# Patient Record
Sex: MALE | Race: WHITE | NOT HISPANIC OR LATINO | URBAN - METROPOLITAN AREA
[De-identification: names, ages, dates, MRNs, and addresses within clinical notes are randomized per-mention and may not be internally consistent; named-entity substitution may affect disease eponyms.]

---

## 2021-09-02 ENCOUNTER — EMERGENCY (EMERGENCY)
Facility: HOSPITAL | Age: 61
LOS: 0 days | Discharge: HOME | End: 2021-09-02
Attending: EMERGENCY MEDICINE | Admitting: EMERGENCY MEDICINE
Payer: COMMERCIAL

## 2021-09-02 VITALS
RESPIRATION RATE: 16 BRPM | TEMPERATURE: 99 F | WEIGHT: 220.02 LBS | DIASTOLIC BLOOD PRESSURE: 78 MMHG | OXYGEN SATURATION: 98 % | SYSTOLIC BLOOD PRESSURE: 126 MMHG | HEART RATE: 78 BPM

## 2021-09-02 DIAGNOSIS — Z23 ENCOUNTER FOR IMMUNIZATION: ICD-10-CM
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
## 2021-09-02 DIAGNOSIS — Z87.891 PERSONAL HISTORY OF NICOTINE DEPENDENCE: ICD-10-CM

## 2021-09-02 DIAGNOSIS — I10 ESSENTIAL (PRIMARY) HYPERTENSION: ICD-10-CM

## 2021-09-02 DIAGNOSIS — S91.312A LACERATION WITHOUT FOREIGN BODY, LEFT FOOT, INITIAL ENCOUNTER: ICD-10-CM
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
## 2021-09-02 DIAGNOSIS — S91.115A LACERATION WITHOUT FOREIGN BODY OF LEFT LESSER TOE(S) WITHOUT DAMAGE TO NAIL, INITIAL ENCOUNTER: ICD-10-CM

## 2021-09-02 DIAGNOSIS — M79.672 PAIN IN LEFT FOOT: ICD-10-CM

## 2021-09-02 PROCEDURE — 12002 RPR S/N/AX/GEN/TRNK2.6-7.5CM: CPT

## 2021-09-02 PROCEDURE — 73630 X-RAY EXAM OF FOOT: CPT | Mod: 26,LT

## 2021-09-02 PROCEDURE — 99284 EMERGENCY DEPT VISIT MOD MDM: CPT | Mod: 25

## 2021-09-02 RX ORDER — TETANUS TOXOID, REDUCED DIPHTHERIA TOXOID AND ACELLULAR PERTUSSIS VACCINE, ADSORBED 5; 2.5; 8; 8; 2.5 [IU]/.5ML; [IU]/.5ML; UG/.5ML; UG/.5ML; UG/.5ML
0.5 SUSPENSION INTRAMUSCULAR ONCE
Refills: 0 | Status: COMPLETED | OUTPATIENT
Start: 2021-09-02 | End: 2021-09-02

## 2021-09-02 RX ORDER — TETANUS AND DIPHTHERIA TOXOIDS ADSORBED 2; 2 [LF]/.5ML; [LF]/.5ML
0.5 INJECTION INTRAMUSCULAR ONCE
Refills: 0 | Status: DISCONTINUED | OUTPATIENT
Start: 2021-09-02 | End: 2021-09-02

## 2021-09-02 RX ADMIN — TETANUS TOXOID, REDUCED DIPHTHERIA TOXOID AND ACELLULAR PERTUSSIS VACCINE, ADSORBED 0.5 MILLILITER(S): 5; 2.5; 8; 8; 2.5 SUSPENSION INTRAMUSCULAR at 13:39

## 2021-09-02 NOTE — ED PROVIDER NOTE - CLINICAL SUMMARY MEDICAL DECISION MAKING FREE TEXT BOX
60 Y/O M WITH L FOOT LACERATION. FOOT XRAYS NORMAL. LACERATION REPAIRED. PT GIVEN ABX AND RETURN INSTRUCTIONS.

## 2021-09-02 NOTE — ED PROVIDER NOTE - NSFOLLOWUPINSTRUCTIONS_ED_ALL_ED_FT
Please take antibiotics 2x per day for the next 10 days, and return to a doctor in 14 days to have your stitches removed.     Laceration    A laceration is a cut that goes through all of the layers of the skin and into the tissue that is right under the skin. Some lacerations heal on their own. Others need to be closed with skin adhesive strips, skin glue, stitches (sutures), or staples. Proper laceration care minimizes the risk of infection and helps the laceration to heal better.  If non-absorbable stitches or staples have been placed, they must be taken out within the time frame instructed by your healthcare provider.    SEEK IMMEDIATE MEDICAL CARE IF YOU HAVE ANY OF THE FOLLOWING SYMPTOMS: swelling around the wound, worsening pain, drainage from the wound, red streaking going away from your wound, inability to move finger or toe near the laceration, or discoloration of skin near the laceration.

## 2021-09-02 NOTE — ED ADULT NURSE NOTE - CAS TRG GENERAL AIRWAY, MLM
[Time Spent: ___ minutes] : I have spent [unfilled] minutes of face to face time with the patient [>50% of Time Spent on Counseling for ____] : Greater than 50% of the encounter time was spent on counseling for [unfilled] Patent

## 2021-09-02 NOTE — ED PROVIDER NOTE - OBJECTIVE STATEMENT
61M no pmhx presents with left foot pain, notes he fell down a few stairs last night when trying to go down to his basement, denies head trauma/loc, started bleeding from his left 5th toe, unknown tetanus status, denies numbness/tingling.

## 2021-09-02 NOTE — ED PROVIDER NOTE - PHYSICAL EXAMINATION
Vital Signs: I have reviewed the initial vital signs.  Constitutional: well-nourished, appears stated age, no acute distress.  HEENT: Airway patent, moist MM, no erythema/swelling/deformity of oral structures. EOMI, PERRLA.  CV: regular rate, regular rhythm, well-perfused extremities, 2+ b/l DP and radial pulses equal.  Lungs: BCTA, no increased WOB.  ABD: NTND, no guarding or rebound, no pulsatile mass, no hernias, no flank pain.   MSK: Neck supple, nontender, nl ROM, no stepoff. Chest nontender. Back nontender in TLS spine or to b/l bony structures. there are 2 lacerations noted to the left 5th toe, a 2 cm laceration noted to the webbing between the 4th and 5th toe as well as a 2 cm laceration on the plantar surface of the MTP of the 5th toe.   INTEG: Skin warm, dry, no rash.  NEURO: A&Ox3, moving all extremities, normal speech  PSYCH: Calm, cooperative, normal affect and interaction.

## 2021-09-02 NOTE — ED PROVIDER NOTE - PATIENT PORTAL LINK FT
You can access the FollowMyHealth Patient Portal offered by [facility] by registering at the following website: [email or web address]. By joining [employer]’s FollowMyHealth portal, you will also be able to view your health information using other applications (apps) compatible with our system.

## 2021-09-02 NOTE — ED PROVIDER NOTE - NSFOLLOWUPCLINICS_GEN_ALL_ED_FT
[facility]  Medicine  [address], [place]   Phone: [phone]  Fax:   Follow Up Time: Routine

## 2021-09-02 NOTE — ED PROVIDER NOTE - ATTENDING CONTRIBUTION TO CARE
I personally evaluated the patient. I reviewed the Resident’s or Physician Assistant’s note (as assigned above), and agree with the findings and plan except as documented in my note.   62 Y/O M HTN S/O FOOT INJURY WHILE CLEARING A FLOOD IN HIS BASEMENT LAST NIGHT. PT IS UNSURE OF HOW HE INJURED THE FOOT. TETANUS STATUS UNKNOWN. NO OTHER INJURIES. VITALS NOTED. ALERT OX3 NAD WELL APPEARING. L FOOT WITH 3 CM LACERATION FROM 1/2 WEBSPACE TO DORSUM OF FOOT. + TENDERNESS OVER 4/5TH TOES AND 4/5 METACARPALS. FROM ALL TOES. 2+ PEDAL PULSES B/L. NO EDEMA.

## 2022-09-16 ENCOUNTER — EMERGENCY (EMERGENCY)
Facility: HOSPITAL | Age: 62
LOS: 0 days | Discharge: HOME | End: 2022-09-16
Attending: EMERGENCY MEDICINE | Admitting: EMERGENCY MEDICINE

## 2022-09-16 VITALS
HEIGHT: 69 IN | RESPIRATION RATE: 18 BRPM | HEART RATE: 106 BPM | WEIGHT: 220.02 LBS | SYSTOLIC BLOOD PRESSURE: 154 MMHG | DIASTOLIC BLOOD PRESSURE: 74 MMHG | OXYGEN SATURATION: 96 %

## 2022-09-16 VITALS
HEART RATE: 88 BPM | RESPIRATION RATE: 18 BRPM | SYSTOLIC BLOOD PRESSURE: 148 MMHG | DIASTOLIC BLOOD PRESSURE: 72 MMHG | OXYGEN SATURATION: 100 %

## 2022-09-16 DIAGNOSIS — I10 ESSENTIAL (PRIMARY) HYPERTENSION: ICD-10-CM

## 2022-09-16 DIAGNOSIS — R04.0 EPISTAXIS: ICD-10-CM

## 2022-09-16 DIAGNOSIS — E78.5 HYPERLIPIDEMIA, UNSPECIFIED: ICD-10-CM

## 2022-09-16 DIAGNOSIS — Z79.82 LONG TERM (CURRENT) USE OF ASPIRIN: ICD-10-CM

## 2022-09-16 PROCEDURE — 99284 EMERGENCY DEPT VISIT MOD MDM: CPT

## 2022-09-16 NOTE — ED PROVIDER NOTE - PROGRESS NOTE DETAILS
packing soaked with txa, placed, observed in ed, no bleeding from left nare, right nare with packing still mild ooze with packing in place, offered labs and more observational time- s/w pt about need for potential transfer to [place] for ent eval, pt would like to go home at this time, spoke strictly about return precautions for continuos bleeding or spitting up blood from post nasal drip. verbalizes understanding.

## 2022-09-16 NOTE — ED PROVIDER NOTE - CLINICAL SUMMARY MEDICAL DECISION MAKING FREE TEXT BOX
62y male on ASA 81mg with h/o childhood nosebleeds c/o nosebleed since last night, initially on right then on both sides sent from [facility] [place], on exam vital signs appreciated, well appearing conj pink +epistaxis right nare, no site visible, packed with mild improvement but still oozing anteriorly, repacked with TXA soaked rhinorocket with improvement, no blood in posterior op, plan for further observation and possible b/l packing as still with some oozing but patient does not want to wait/prefers to manage at home, will d/c with ENT f/u. Patient counseled regarding conditions which should prompt return.

## 2022-09-16 NOTE — ED PROVIDER NOTE - NSFOLLOWUPINSTRUCTIONS_ED_ALL_ED_FT
Our Emergency Department Referral Coordinators will be reaching out ot you in the next 24-48 hours from 9:00am to 5:00pm (Monday to Friday) with a follow up appointment. Please expect a phone call from the hospital in that time frame. If you do not receive a call or if you have any questions or concerns, you can reach them at [phone] or [phone].       Epistaxis (nosebleed)    Nosebleeds are common and can be caused by many conditions, such as injury, infections, dry mucous membranes or dry climate, medicines, nose picking, and home heating and cooling systems. Try controlling your nosebleed by pinching your nose continuously for at least 10 minutes. Avoid lying down while you are having a nosebleed. Sit up and lean forward. Avoid blowing or sniffing your nose for a number of hours after having a nosebleed. Resume your normal activities as you are able, but avoid straining, lifting, or bending at the waist for several days. Maintain humidity in your home by using less air conditioning or by using a humidifier.     If your nose was packed by your health care provider, try to maintain the pack inside of your nose until your health care provider removes it. If a balloon catheter was used to pack your nose, do not cut or remove it unless your health care provider has instructed you to do that.     Aspirin and blood thinners make bleeding more likely. If you are prescribed these medicines and you suffer from nosebleeds, ask your health care provider if you should stop taking the medicines or adjust the dose. Do not stop medicines unless directed by your health care provider     SEEK IMMEDIATE MEDICAL CARE IF YOU HAVE THE FOLLOWING SYMPTOMS: nosebleed lasting longer than 20 minutes, unusual bleeding from or bruising on other parts of your body, dizziness or lightheadedness, nosebleed occurring after a head injury, or fever.

## 2022-09-16 NOTE — ED PROVIDER NOTE - ATTENDING APP SHARED VISIT CONTRIBUTION OF CARE
62y male on ASA 81mg with h/o childhood nosebleeds c/o nosebleed since last night, initially on right then on both sides sent from [facility] [place], on exam vital signs appreciated, well appearing conj pink +epistaxis right nare, no site visible, packed with mild improvement but still oozing anteriorly, repacked with TXA soaked rhinorocket with improvement, no blood in posterior op, plan for further observation and possible b/l packing as still with some oozing but patient does not want to wait/prefers to manage at home, will d/c with ENT f/u. Patient counseled regarding conditions which should prompt return.

## 2022-09-16 NOTE — ED PROVIDER NOTE - CARE PROVIDER_API CALL
Manny Weber)  Otolaryngology  [address] [place] [address], [address] [place] [address]  Phone: [phone]  Fax: [phone]  Follow Up Time: 1-3 Days

## 2022-09-16 NOTE — ED PROVIDER NOTE - PATIENT PORTAL LINK FT
You can access the FollowMyHealth Patient Portal offered by [facility] by registering at the following website: [email or web address]. By joining [employer]’s FollowMyHealth portal, you will also be able to view your health information using other applications (apps) compatible with our system.

## 2022-09-16 NOTE — ED PROVIDER NOTE - OBJECTIVE STATEMENT
61 yo male, pmh of htn, hld, on asa, presents to ed for epistaxis, started last night, right sided, not better with compression, no pain or radiation. denies trauma, cp, sob, nv.

## 2022-09-16 NOTE — ED PROVIDER NOTE - NS ED ATTENDING STATEMENT MOD
This was a shared visit with the ERROL. I reviewed and verified the documentation and independently performed the documented:

## 2022-09-16 NOTE — ED PROVIDER NOTE - PHYSICAL EXAMINATION
Physical Exam    Vital Signs: I have reviewed the initial vital signs.  Constitutional: appears stated age, no acute distress  Eyes: Conjunctiva pink, Sclera clear,  ENT: OP is clear without exudates, normal dentition, normal gingival, tongue without swelling, TMs clear b/l, right nare with continuos oozing of blood, unable to identify bleeding source, left nare clear.   Cardiovascular: S1 and S2, regular rate, regular rhythm, well-perfused extremities, radial pulses equal and 2+, pedal pulses 2+ and equal  Respiratory: unlabored respiratory effort, clear to auscultation bilaterally no wheezing, rales and rhonchi  Musculoskeletal: supple neck, no lower extremity edema, no midline tenderness  Integumentary: warm, dry, no rash  Neurologic: awake, alert, nvi

## 2022-09-19 ENCOUNTER — APPOINTMENT (OUTPATIENT)
Dept: OTOLARYNGOLOGY | Facility: CLINIC | Age: 62
End: 2022-09-19

## 2022-09-19 PROCEDURE — 31238 NSL/SINS NDSC SRG NSL HEMRRG: CPT

## 2022-09-19 PROCEDURE — 99203 OFFICE O/P NEW LOW 30 MIN: CPT | Mod: 25

## 2022-09-19 NOTE — HISTORY OF PRESENT ILLNESS
[de-identified] : Patient presents today c/o Epistaxis. Patient had a nosebleed two weeks ago and then again Thursday night that would not stop. Patient went to ED Friday morning and is here today to remove packing. Patient had constant nosebleeds a s a child but he has not had a nosebleed again in the past 50 years till this month. Patient takes aspirin often for pain.

## 2022-09-19 NOTE — ASSESSMENT
[FreeTextEntry1] : I discussed with the patient the pathophysiology of anterior epistaxis. I showed on a drawing the location of the anterior vascular area. I explained the risk factors including but not limited to nasal dryness, and recommended nasal moisturizing and avoiding any intranasal trauma. I also demonstrated how to stop a bleeding if it happens again.\par

## 2022-09-28 ENCOUNTER — APPOINTMENT (OUTPATIENT)
Dept: OTOLARYNGOLOGY | Facility: CLINIC | Age: 62
End: 2022-09-28

## 2022-09-28 VITALS — HEIGHT: 68.5 IN | BODY MASS INDEX: 32.96 KG/M2 | WEIGHT: 220 LBS

## 2022-09-28 DIAGNOSIS — R43.1 PAROSMIA: ICD-10-CM

## 2022-09-28 PROCEDURE — 99214 OFFICE O/P EST MOD 30 MIN: CPT | Mod: 25

## 2022-09-28 PROCEDURE — 31231 NASAL ENDOSCOPY DX: CPT

## 2022-09-28 NOTE — HISTORY OF PRESENT ILLNESS
[FreeTextEntry1] : Patient returns today for follow up Epistaxis. Patient states he has not bled since last visit put had a fishy odor inside of his nose and a dry mouth.

## 2022-10-07 ENCOUNTER — APPOINTMENT (OUTPATIENT)
Dept: OTOLARYNGOLOGY | Facility: CLINIC | Age: 62
End: 2022-10-07

## 2023-03-09 ENCOUNTER — TRANSCRIPTION ENCOUNTER (OUTPATIENT)
Age: 63
End: 2023-03-09

## 2023-03-09 ENCOUNTER — INPATIENT (INPATIENT)
Facility: HOSPITAL | Age: 63
LOS: 6 days | Discharge: HOME | DRG: 291 | End: 2023-03-16
Attending: HOSPITALIST | Admitting: HOSPITALIST
Payer: COMMERCIAL

## 2023-03-09 VITALS
DIASTOLIC BLOOD PRESSURE: 97 MMHG | RESPIRATION RATE: 20 BRPM | WEIGHT: 240.08 LBS | TEMPERATURE: 98 F | OXYGEN SATURATION: 99 % | SYSTOLIC BLOOD PRESSURE: 190 MMHG | HEART RATE: 162 BPM

## 2023-03-09 DIAGNOSIS — I48.91 UNSPECIFIED ATRIAL FIBRILLATION: ICD-10-CM

## 2023-03-09 DIAGNOSIS — R04.0 EPISTAXIS: ICD-10-CM

## 2023-03-09 LAB
ALBUMIN SERPL ELPH-MCNC: 4.4 G/DL — SIGNIFICANT CHANGE UP (ref 3.5–5.2)
ALP SERPL-CCNC: 88 U/L — SIGNIFICANT CHANGE UP (ref 30–115)
ALT FLD-CCNC: 66 U/L — HIGH (ref 0–41)
ANION GAP SERPL CALC-SCNC: 20 MMOL/L — HIGH (ref 7–14)
AST SERPL-CCNC: 55 U/L — HIGH (ref 0–41)
BASOPHILS # BLD AUTO: 0.05 K/UL — SIGNIFICANT CHANGE UP (ref 0–0.2)
BASOPHILS NFR BLD AUTO: 0.5 % — SIGNIFICANT CHANGE UP (ref 0–1)
BILIRUB SERPL-MCNC: 0.8 MG/DL — SIGNIFICANT CHANGE UP (ref 0.2–1.2)
BUN SERPL-MCNC: 16 MG/DL — SIGNIFICANT CHANGE UP (ref 10–20)
CALCIUM SERPL-MCNC: 9.5 MG/DL — SIGNIFICANT CHANGE UP (ref 8.4–10.5)
CHLORIDE SERPL-SCNC: 96 MMOL/L — LOW (ref 98–110)
CO2 SERPL-SCNC: 23 MMOL/L — SIGNIFICANT CHANGE UP (ref 17–32)
CREAT SERPL-MCNC: 1 MG/DL — SIGNIFICANT CHANGE UP (ref 0.7–1.5)
EGFR: 86 ML/MIN/1.73M2 — SIGNIFICANT CHANGE UP
EOSINOPHIL # BLD AUTO: 0.07 K/UL — SIGNIFICANT CHANGE UP (ref 0–0.7)
EOSINOPHIL NFR BLD AUTO: 0.7 % — SIGNIFICANT CHANGE UP (ref 0–8)
GLUCOSE SERPL-MCNC: 118 MG/DL — HIGH (ref 70–99)
HCT VFR BLD CALC: 53.2 % — HIGH (ref 42–52)
HGB BLD-MCNC: 17.9 G/DL — SIGNIFICANT CHANGE UP (ref 14–18)
IMM GRANULOCYTES NFR BLD AUTO: 0.6 % — HIGH (ref 0.1–0.3)
LYMPHOCYTES # BLD AUTO: 19.6 % — LOW (ref 20.5–51.1)
LYMPHOCYTES # BLD AUTO: 2.01 K/UL — SIGNIFICANT CHANGE UP (ref 1.2–3.4)
MCHC RBC-ENTMCNC: 33.6 G/DL — SIGNIFICANT CHANGE UP (ref 32–37)
MCHC RBC-ENTMCNC: 34.5 PG — HIGH (ref 27–31)
MCV RBC AUTO: 102.5 FL — HIGH (ref 80–94)
MONOCYTES # BLD AUTO: 1.11 K/UL — HIGH (ref 0.1–0.6)
MONOCYTES NFR BLD AUTO: 10.8 % — HIGH (ref 1.7–9.3)
NEUTROPHILS # BLD AUTO: 6.94 K/UL — HIGH (ref 1.4–6.5)
NEUTROPHILS NFR BLD AUTO: 67.8 % — SIGNIFICANT CHANGE UP (ref 42.2–75.2)
NRBC # BLD: 0 /100 WBCS — SIGNIFICANT CHANGE UP (ref 0–0)
PLATELET # BLD AUTO: 249 K/UL — SIGNIFICANT CHANGE UP (ref 130–400)
POTASSIUM SERPL-MCNC: 4.8 MMOL/L — SIGNIFICANT CHANGE UP (ref 3.5–5)
POTASSIUM SERPL-SCNC: 4.8 MMOL/L — SIGNIFICANT CHANGE UP (ref 3.5–5)
PROT SERPL-MCNC: 7.8 G/DL — SIGNIFICANT CHANGE UP (ref 6–8)
RBC # BLD: 5.19 M/UL — SIGNIFICANT CHANGE UP (ref 4.7–6.1)
RBC # FLD: 14.8 % — HIGH (ref 11.5–14.5)
SARS-COV-2 RNA SPEC QL NAA+PROBE: SIGNIFICANT CHANGE UP
SODIUM SERPL-SCNC: 139 MMOL/L — SIGNIFICANT CHANGE UP (ref 135–146)
WBC # BLD: 10.24 K/UL — SIGNIFICANT CHANGE UP (ref 4.8–10.8)
WBC # FLD AUTO: 10.24 K/UL — SIGNIFICANT CHANGE UP (ref 4.8–10.8)

## 2023-03-09 PROCEDURE — 83036 HEMOGLOBIN GLYCOSYLATED A1C: CPT

## 2023-03-09 PROCEDURE — 86850 RBC ANTIBODY SCREEN: CPT

## 2023-03-09 PROCEDURE — 80048 BASIC METABOLIC PNL TOTAL CA: CPT

## 2023-03-09 PROCEDURE — 83735 ASSAY OF MAGNESIUM: CPT

## 2023-03-09 PROCEDURE — 74176 CT ABD & PELVIS W/O CONTRAST: CPT

## 2023-03-09 PROCEDURE — 71045 X-RAY EXAM CHEST 1 VIEW: CPT | Mod: 26

## 2023-03-09 PROCEDURE — 86803 HEPATITIS C AB TEST: CPT

## 2023-03-09 PROCEDURE — U0003: CPT

## 2023-03-09 PROCEDURE — 84443 ASSAY THYROID STIM HORMONE: CPT

## 2023-03-09 PROCEDURE — 85025 COMPLETE CBC W/AUTO DIFF WBC: CPT

## 2023-03-09 PROCEDURE — 83880 ASSAY OF NATRIURETIC PEPTIDE: CPT

## 2023-03-09 PROCEDURE — 36415 COLL VENOUS BLD VENIPUNCTURE: CPT

## 2023-03-09 PROCEDURE — 93306 TTE W/DOPPLER COMPLETE: CPT

## 2023-03-09 PROCEDURE — 86900 BLOOD TYPING SEROLOGIC ABO: CPT

## 2023-03-09 PROCEDURE — 85027 COMPLETE CBC AUTOMATED: CPT

## 2023-03-09 PROCEDURE — A9562: CPT

## 2023-03-09 PROCEDURE — 85301 ANTITHROMBIN III ANTIGEN: CPT

## 2023-03-09 PROCEDURE — 93005 ELECTROCARDIOGRAM TRACING: CPT

## 2023-03-09 PROCEDURE — 80053 COMPREHEN METABOLIC PANEL: CPT

## 2023-03-09 PROCEDURE — 76705 ECHO EXAM OF ABDOMEN: CPT

## 2023-03-09 PROCEDURE — 87635 SARS-COV-2 COVID-19 AMP PRB: CPT

## 2023-03-09 PROCEDURE — 99223 1ST HOSP IP/OBS HIGH 75: CPT

## 2023-03-09 PROCEDURE — 82746 ASSAY OF FOLIC ACID SERUM: CPT

## 2023-03-09 PROCEDURE — 84100 ASSAY OF PHOSPHORUS: CPT

## 2023-03-09 PROCEDURE — 78708 K FLOW/FUNCT IMAGE W/DRUG: CPT

## 2023-03-09 PROCEDURE — 85300 ANTITHROMBIN III ACTIVITY: CPT

## 2023-03-09 PROCEDURE — 84484 ASSAY OF TROPONIN QUANT: CPT

## 2023-03-09 PROCEDURE — 92960 CARDIOVERSION ELECTRIC EXT: CPT

## 2023-03-09 PROCEDURE — 85610 PROTHROMBIN TIME: CPT

## 2023-03-09 PROCEDURE — 93320 DOPPLER ECHO COMPLETE: CPT

## 2023-03-09 PROCEDURE — 85220 BLOOC CLOT FACTOR V TEST: CPT

## 2023-03-09 PROCEDURE — 85306 CLOT INHIBIT PROT S FREE: CPT

## 2023-03-09 PROCEDURE — 85379 FIBRIN DEGRADATION QUANT: CPT

## 2023-03-09 PROCEDURE — 99291 CRITICAL CARE FIRST HOUR: CPT

## 2023-03-09 PROCEDURE — 82607 VITAMIN B-12: CPT

## 2023-03-09 PROCEDURE — 93325 DOPPLER ECHO COLOR FLOW MAPG: CPT

## 2023-03-09 PROCEDURE — 86901 BLOOD TYPING SEROLOGIC RH(D): CPT

## 2023-03-09 PROCEDURE — 85730 THROMBOPLASTIN TIME PARTIAL: CPT

## 2023-03-09 PROCEDURE — U0005: CPT

## 2023-03-09 PROCEDURE — 80061 LIPID PANEL: CPT

## 2023-03-09 PROCEDURE — 93312 ECHO TRANSESOPHAGEAL: CPT

## 2023-03-09 RX ORDER — DILTIAZEM HCL 120 MG
60 CAPSULE, EXT RELEASE 24 HR ORAL ONCE
Refills: 0 | Status: COMPLETED | OUTPATIENT
Start: 2023-03-09 | End: 2023-03-09

## 2023-03-09 RX ORDER — DILTIAZEM HCL 120 MG
35 CAPSULE, EXT RELEASE 24 HR ORAL ONCE
Refills: 0 | Status: COMPLETED | OUTPATIENT
Start: 2023-03-09 | End: 2023-03-09

## 2023-03-09 RX ORDER — PANTOPRAZOLE SODIUM 20 MG/1
40 TABLET, DELAYED RELEASE ORAL
Refills: 0 | Status: DISCONTINUED | OUTPATIENT
Start: 2023-03-09 | End: 2023-03-16

## 2023-03-09 RX ORDER — TRANEXAMIC ACID 100 MG/ML
5 INJECTION, SOLUTION INTRAVENOUS ONCE
Refills: 0 | Status: COMPLETED | OUTPATIENT
Start: 2023-03-09 | End: 2023-03-09

## 2023-03-09 RX ORDER — LANOLIN ALCOHOL/MO/W.PET/CERES
5 CREAM (GRAM) TOPICAL AT BEDTIME
Refills: 0 | Status: DISCONTINUED | OUTPATIENT
Start: 2023-03-09 | End: 2023-03-16

## 2023-03-09 RX ORDER — ACETAMINOPHEN 500 MG
650 TABLET ORAL EVERY 6 HOURS
Refills: 0 | Status: DISCONTINUED | OUTPATIENT
Start: 2023-03-09 | End: 2023-03-16

## 2023-03-09 RX ORDER — DILTIAZEM HCL 120 MG
60 CAPSULE, EXT RELEASE 24 HR ORAL EVERY 6 HOURS
Refills: 0 | Status: DISCONTINUED | OUTPATIENT
Start: 2023-03-09 | End: 2023-03-10

## 2023-03-09 RX ORDER — FUROSEMIDE 40 MG
40 TABLET ORAL DAILY
Refills: 0 | Status: DISCONTINUED | OUTPATIENT
Start: 2023-03-09 | End: 2023-03-09

## 2023-03-09 RX ORDER — DILTIAZEM HCL 120 MG
20 CAPSULE, EXT RELEASE 24 HR ORAL ONCE
Refills: 0 | Status: COMPLETED | OUTPATIENT
Start: 2023-03-09 | End: 2023-03-09

## 2023-03-09 RX ORDER — LOSARTAN POTASSIUM 100 MG/1
50 TABLET, FILM COATED ORAL DAILY
Refills: 0 | Status: DISCONTINUED | OUTPATIENT
Start: 2023-03-09 | End: 2023-03-11

## 2023-03-09 RX ORDER — DILTIAZEM HCL 120 MG
35 CAPSULE, EXT RELEASE 24 HR ORAL ONCE
Refills: 0 | Status: DISCONTINUED | OUTPATIENT
Start: 2023-03-09 | End: 2023-03-09

## 2023-03-09 RX ORDER — DILTIAZEM HCL 120 MG
25 CAPSULE, EXT RELEASE 24 HR ORAL ONCE
Refills: 0 | Status: COMPLETED | OUTPATIENT
Start: 2023-03-09 | End: 2023-03-09

## 2023-03-09 RX ADMIN — TRANEXAMIC ACID 5 MILLILITER(S): 100 INJECTION, SOLUTION INTRAVENOUS at 19:23

## 2023-03-09 RX ADMIN — Medication 60 MILLIGRAM(S): at 15:37

## 2023-03-09 RX ADMIN — Medication 20 MILLIGRAM(S): at 17:22

## 2023-03-09 RX ADMIN — Medication 25 MILLIGRAM(S): at 13:50

## 2023-03-09 RX ADMIN — Medication 35 MILLIGRAM(S): at 14:25

## 2023-03-09 NOTE — ED PROVIDER NOTE - ATTENDING CONTRIBUTION TO CARE
I have personally seen and examined this patient. I have fully participated in the care of this patient. I have made amendments to the documentation where appropriate and otherwise agree with the history, physical exam, and plan as documented by the Resident. I have personally seen and examined this patient. I have fully participated in the care of this patient. I have made amendments to the documentation where appropriate and otherwise agree with the history, physical exam, and plan as documented by the Resident.    63 yo man w/ hx of HTN here w/ epistaxis since last niht  found to be in AF w/ RVR  no hx of AF in past  no AC/Antiplt  Unclear onset    nc/at  + anterior nasal bleeding  eomi, perrl  irreg irreg, tachy  cta b/l  neuro intact  aao X 4    63 yo man w/ new onset AF of unknown onset and minor epistaxis  Rate contorl w/ diltiazem (pt not candidate for conversion w/o echo secondary to unknown onset time)  TXA and pressure for epistaxis  Will likely require admission for rate control, echo and eval for AC  CHADS-VASC = 1    Crit Care Time (45 min): Pt arrived w/ AF RVR unknown onset. IV, monitor, labs, ecg ordered and interpreted. Pt started on diltiazem bolus X 2 w/ control of rate to  and started on oral diltiazem. Evaluation for AC (CHADS VASC 1). > 45 min spent in assessment, management, reassessment and discussion w/ consultants at bedside

## 2023-03-09 NOTE — ED PROVIDER NOTE - OBJECTIVE STATEMENT
63 yo m ho htn presents with epistaxis and rapid heart rate. Patient admits began having a nosebleed from left nostril yesterday- came to ED today for no improvement. In triage found to be in afib with RVR. Patient denies cp, sob, palpitations, trauma, nausea, vomiting, diarrhea, ho of similar sxs.

## 2023-03-09 NOTE — H&P ADULT - NSHPREVIEWOFSYSTEMS_GEN_ALL_CORE
CONSTITUTIONAL: No weakness, fevers or chills  EYES/ENT: No visual changes;  No vertigo or throat pain   NECK: No pain or stiffness  RESPIRATORY: No cough, wheezing, hemoptysis; (+) Exertional shortness of breath  CARDIOVASCULAR: No chest pain (+) palpitations. (+) Orthopnea  GASTROINTESTINAL: No abdominal or epigastric pain. No nausea, vomiting, or hematemesis; No diarrhea or constipation. No melena or hematochezia.  GENITOURINARY: No dysuria, frequency or hematuria  NEUROLOGICAL: No numbness or weakness  SKIN: No itching, rashes

## 2023-03-09 NOTE — ED ADULT TRIAGE NOTE - CHIEF COMPLAINT QUOTE
Pt c/o nosebleed since last night. Pt denies a/c use. pt HR high in triage Pt c/o nosebleed since last night. Pt denies a/c use. pt HR high in triage. has no other complaints

## 2023-03-09 NOTE — H&P ADULT - NSHPPHYSICALEXAM_GEN_ALL_CORE
General: NAD, AOx3  HEENT: Normocephalic, atraumatic  Lungs: Normal breath sounds, no wheezes/crackles  Heart: Irregularly irregular, no mrg  Abdomen: Soft, NT/ND. No rigidity/guarding  Extremities: Peripheral pulses +1, no cyanosis. +1 b/l LE edema, dry skin on LE  Neuro: Grossly normal motor exam, no focal deficits  Psych: AOx3, normal affect  Skin: Dry skin and hyperpigmented LE, no rashes or bruises

## 2023-03-09 NOTE — H&P ADULT - NSHPLABSRESULTS_GEN_ALL_CORE
< from: Xray Chest 1 View- PORTABLE-Urgent (03.09.23 @ 14:37) >      Impression:    Mild cardiomegaly. No consolidation, effusion or pneumothorax.    --- End of Report ---    < end of copied text >

## 2023-03-09 NOTE — H&P ADULT - ASSESSMENT
62M with PMHx of HTN presents to the ED for epistaxis and palpitations x1 day. Pt reports that he had a nosebleed yesterday that has not stopped since then, so he presented for evaluation. He also complains of palpitations**    #New-onset Afib w/ RVR  - , /97, 1 day of palpitations  - EKG Afib w/ RVR and RBBB  - CXR mild cardiomegaly but no consolidations/effusions  - s/p cardizem drip and 60mg PO cardizem  - Admit to telemetry  - [facility] 1 -> Start on ASA after epistaxis slows down  - F/u TTE, TSH  - EP consult for possible cardioversion  - Keep NPO after midnight in case of cardioversion  - c/w cardizem for rate control  - F/u lipid panel and A1c    #HTN  #HTN Urgency  #Epistaxis  - Presented with /97 and mild epistaxis   - BP improved to 135/91 in ED  - s/p TXA for epistaxis  - c/w **    DVT ppx: SCDs   GI ppx: Protonix  Diet: DASH/TLC, NPO after MN  Activity: IAT  Dispo: Acute 62M with PMHx of HTN presents to the ED for epistaxis and palpitations x1 day. Pt reports that he had a nosebleed yesterday that has not stopped since then, so he presented for evaluation. He also complains of palpitations and an "anxious feeling" intermittent over the past few months. On further questioning pt reports that he hasn't seen a PCP in over a year, and during that period he has been having orthopnea, exertional dyspnea, and occasional LE swelling.     #New-onset Afib w/ RVR  #Suspected New-onset CHF  - , /97, months of intermittent palpitations, orthopnea, LE swelling, exertional dyspnea  - EKG Afib w/ RVR and RBBB  - CXR mild cardiomegaly but no consolidations/effusions  - s/p cardizem IV and PO   - Admit to telemetry  - [facility] -> Start on ASA after epistaxis slows down  - Mild transaminitis likely 2/2 hepatic congestion  - F/u TTE, TSH  - c/w cardizem for rate control  - Start IV lasix 40mg qd, pt not significant overloaded  - Strict I&Os, daily weights  - Consider EP consult for cardioversion during this admission or outpatient  - Risk factors for Afib include daily EtOH use (2-3 beers/day) and suspected SANJU, consider outpatient sleep study    #HTN  #HTN Urgency due to medication non-compliance  #Epistaxis  - Presented with /97 and mild epistaxis   - BP improved to 135/91 in ED  - s/p TXA for epistaxis  - Pt was on amlodipine 10 and HCTZ-Losartan that he was titrating over the past year then ran out 1 month ago, has been on metop 25mg qd since then  - c/w amlodipine and lasix for now, add BP meds prn  - Pt needs close outpatient PCP f/u    DVT ppx: SCDs   GI ppx: Protonix  Diet: DASH/TLC, NPO after MN  Activity: IAT  Dispo: Acute, telemetry 62M with PMHx of HTN presents to the ED for epistaxis and palpitations x1 day. Pt reports that he had a nosebleed yesterday that has not stopped since then, so he presented for evaluation. He also complains of palpitations and an "anxious feeling" intermittent over the past few months. On further questioning pt reports that he hasn't seen a PCP in over a year, and during that period he has been having orthopnea, exertional dyspnea, and occasional LE swelling.     #New-onset Afib w/ RVR  #Suspected New-onset CHF  - , /97, months of intermittent palpitations, orthopnea, LE swelling, exertional dyspnea  - EKG Afib w/ RVR and RBBB  - CXR mild cardiomegaly but no consolidations/effusions  - s/p cardizem IV and PO   - Admit to telemetry  - [facility] 1 -> Start on ASA after epistaxis slows down  - Mild transaminitis likely 2/2 hepatic congestion  - F/u TTE, TSH  - c/w cardizem for rate control  - Start IV lasix 40mg qd, pt not significant overloaded  - Strict I&Os, daily weights  - Consider EP consult for cardioversion during this admission or outpatient - will keep NPO   - Risk factors for Afib include daily EtOH use (2-3 beers/day) and suspected SANJU, consider outpatient sleep study    #HTN  #HTN Urgency due to medication non-compliance  #Epistaxis  - Presented with /97 and mild epistaxis   - BP improved to 135/91 in ED  - s/p TXA for epistaxis  - Pt was on amlodipine 10 and HCTZ-Losartan that he was titrating over the past year then ran out 1 month ago, has been on metop 25mg qd since then  - c/w losartan, cardizem and lasix, add BP meds prn  - Pt needs close outpatient PCP f/u    DVT ppx: SCDs   GI ppx: Protonix  Diet: DASH/TLC, NPO after MN  Activity: IAT  Dispo: Acute, telemetry

## 2023-03-09 NOTE — ED PROVIDER NOTE - CLINICAL SUMMARY MEDICAL DECISION MAKING FREE TEXT BOX
61 yo man w/ hx of HTN here w/ epistaxis since last niht  found to be in AF w/ RVR  no hx of AF in past  no AC/Antiplt  Unclear onset    nc/at  + anterior nasal bleeding  eomi, perrl  irreg irreg, tachy  cta b/l  neuro intact  aao X 4    61 yo man w/ new onset AF of unknown onset and minor epistaxis  Rate contorl w/ diltiazem (pt not candidate for conversion w/o echo secondary to unknown onset time)  TXA and pressure for epistaxis  Will likely require admission for rate control, echo and eval for AC  CHADS-VASC = 1    Crit Care Time (45 min): Pt arrived w/ AF RVR unknown onset. IV, monitor, labs, ecg ordered and interpreted. Pt started on diltiazem bolus X 2 w/ control of rate to  and started on oral diltiazem. Evaluation for AC (CHADS VASC 1). > 45 min spent in assessment, management, reassessment and discussion w/ consultants at bedside 61 y/o M found to be in new onset AF, unknown onset. EPistaxis resolved in the ED with TXA and pressure. Patient given multiple doses of Diltiazem and had appropriate rate control. CHADS-VASC = 1. Also will hold on anticoagulation given epistaxis and CHADS-Vasc score.    ED work up reviewed and results and plan of care discussed with patient. Patient requires admission for further work up, monitoring, and management. Need for admission discussed with patient.

## 2023-03-09 NOTE — H&P ADULT - ATTENDING COMMENTS
Patient seen and examined at bedside independently of the residents. I read the resident's note and agree with the plan with the additions and corrections as noted below. My note supersedes the resident's note.     REVIEW OF SYSTEMS:  Negative except in HPI.       PMH: HTN, HLD, Epistaxis    FHx: Reviewed. No fhx of asthma/copd, No fhx of liver and pulmonary disease. No fhx of hematological disorder.     Physical Exam:  GEN: No acute distress. Awake, Alert and oriented x 3.   Head: Atraumatic, Normocephalic.   Eye: PEERLA. No sclera icterus. EOMI.   ENT: Normal oropharynx, no thyromegaly, no mass, no lymphadenopathy.   LUNGS: Clear to auscultation bilaterally. No wheeze/rales/crackles.   HEART: Normal. S1/S2 present. RRR. No murmur/gallops.   ABD: Soft, non-tender, non-distended. Bowel sounds present.   EXT: No pitting edema. No erythema. No tenderness.  Integumentary: No rash, No sore, No petechia.   NEURO: CN III-XII intact. Strength: 5/5 b/l ULE. Sensory intact b/l ULE.     Vital Signs Last 24 Hrs  T(C): 36.9 (09 Mar 2023 13:04), Max: 36.9 (09 Mar 2023 13:04)  T(F): 98.4 (09 Mar 2023 13:04), Max: 98.4 (09 Mar 2023 13:04)  HR: 125 (09 Mar 2023 17:15) (125 - 172)  BP: 135/91 (09 Mar 2023 17:15) (135/91 - 190/97)  BP(mean): --  RR: 14 (09 Mar 2023 17:15) (14 - 20)  SpO2: 99% (09 Mar 2023 17:15) (99% - 99%)    Parameters below as of 09 Mar 2023 17:15  Patient On (Oxygen Delivery Method): room air      Please see the above notes for Labs and radiology.     Assessment and Plan:     61 yo M with hx of HTN, HLD, Epistaxis presents to ED for epistaxis since last night, found to have A.fib RVR in ED.     A.fib RVR   - s/p IV cardizem multiple pushes in ED with PO Cardizem 60mg x 1 in ED.   - c/w cardizem 60mg q6h for now.   - check TTE and TSH   - CHADs VASc 1. Hold off ASA/AC as patient is actively bleeding.   - EP consult.     Epistaxis   - Hb stable.  - s/p TXA in ED  - Monitor CBC.   - Active type and Screen.      HTN urgency   - BP improved.   - c/w home med    DVT ppx: SCD  GI ppx: PPI  Diet: DASH diet  Activity: as tolerated.      DVT ppx:   GI ppx:    Diet:   Activity: as tolerated.     Date seen by the attending:   Total time spent: 75 minutes. Patient seen and examined at bedside independently of the residents. I read the resident's note and agree with the plan with the additions and corrections as noted below. My note supersedes the resident's note.     REVIEW OF SYSTEMS:  Negative except in HPI.       PMH: HTN, HLD, Epistaxis    FHx: Reviewed. No fhx of asthma/copd, No fhx of liver and pulmonary disease. No fhx of hematological disorder.     Physical Exam:  GEN: No acute distress. Awake, Alert and oriented x 3.   Head: Atraumatic, Normocephalic.   Eye: PEERLA. No sclera icterus. EOMI.   ENT: Normal oropharynx, no thyromegaly, no mass, no lymphadenopathy.   LUNGS: Clear to auscultation bilaterally. No wheeze/rales/crackles.   HEART: Normal. S1/S2 present. Irregularly irregular. No murmur/gallops.   ABD: Soft, non-tender, non-distended. Bowel sounds present.   EXT: 1+ pitting edema. No erythema. No tenderness.  Integumentary: No rash, No sore, No petechia.   NEURO: CN III-XII intact. Strength: 5/5 b/l ULE. Sensory intact b/l ULE.     Vital Signs Last 24 Hrs  T(C): 36.9 (09 Mar 2023 13:04), Max: 36.9 (09 Mar 2023 13:04)  T(F): 98.4 (09 Mar 2023 13:04), Max: 98.4 (09 Mar 2023 13:04)  HR: 125 (09 Mar 2023 17:15) (125 - 172)  BP: 135/91 (09 Mar 2023 17:15) (135/91 - 190/97)  BP(mean): --  RR: 14 (09 Mar 2023 17:15) (14 - 20)  SpO2: 99% (09 Mar 2023 17:15) (99% - 99%)    Parameters below as of 09 Mar 2023 17:15  Patient On (Oxygen Delivery Method): room air      Please see the above notes for Labs and radiology.     Assessment and Plan:     61 yo M with hx of HTN, HLD, Epistaxis presents to ED for epistaxis since last night, found to have A.fib RVR in ED.     A.fib RVR   - s/p IV cardizem multiple pushes in ED with PO Cardizem 60mg x 1 in ED.   - c/w cardizem 60mg q6h for now.   - check TTE and TSH   - CHADs VASc 1. Hold off ASA/AC as patient is actively bleeding.   - EP consult.     Dyspnea on exertion  - patient also has 1+ pitting edema  - check TTE and proBNP  - will give IV lasix 40mg qd today and tomorrow.     Epistaxis   - Hb stable.  - s/p TXA in ED  - Monitor CBC.   - Active type and Screen.      HTN urgency   - BP improved.   - c/w home med    DVT ppx: SCD  GI ppx: PPI  Diet: DASH diet  Activity: as tolerated.      Date seen by the attendin2023  Total time spent: 75 minutes.

## 2023-03-09 NOTE — ED PROVIDER NOTE - NS_BEDUNITTYPES_ED_ALL_ED
Pharmacy faxed in a request for prior authorization on:    Medication: Premarin    Dosage: .03mg   Quantity requested:  30  Pharmacy for prescription has been selected.    Initiation of prior authorization needed.       TELEMETRY

## 2023-03-09 NOTE — ED PROVIDER NOTE - PROGRESS NOTE DETAILS
SS Patient rate controlled after 3 IV cardizem and PO cardizem.   Cardio tele consulted but full. Will go to med tele.   Patient and wife at bedside and aware and agreeable to plan

## 2023-03-09 NOTE — ED PROVIDER NOTE - PHYSICAL EXAMINATION
CONSTITUTIONAL: NAD  SKIN: Warm dry  HEAD: NCAT  EYES: NL inspection  ENT: MMM  +LT nostril no active bleeding visualized   NECK: Supple; non tender.  CARD: RRR  RESP: CTAB  ABD: S/NT no R/G  EXT: no pedal edema  NEURO: Grossly unremarkable  PSYCH: Cooperative, appropriate.

## 2023-03-09 NOTE — H&P ADULT - HISTORY OF PRESENT ILLNESS
62M with PMHx of HTN presents to the ED for epistaxis and palpitations x1 day. Pt reports that he had a nosebleed yesterday that has not stopped since then, so he presented for evaluation. He also complains of palpitations and ***    In the ED: /97, , T 98F, RR 20 satting 99% on RA. Labs notable for AG 20 and mildly elevated AST/ALT but otherwise unremarkable. EKG showed Afib w/ RVR and RBBB. CXR mild cardiomegaly with no consolidations.   Pt received TXA and started on cardizem drip then given PO. Admitted to telemetry.  62M with PMHx of HTN presents to the ED for epistaxis and palpitations x1 day. Pt reports that he had a nosebleed yesterday that has not stopped since then, so he presented for evaluation. He also complains of palpitations and an "anxious feeling" intermittent over the past few months. On further questioning pt reports that he hasn't seen a PCP in over a year, and during that period he has been having orthopnea, exertional dyspnea, and occasional LE swelling. He was on HTN meds but ran out (and was titrating them) a month ago and has been using his girlfriend metoprolol 25mg qd since then. He denies any CP, abd pain, N/V/C/D, recent travel or sick contacts, or any  symptoms.    In the ED: /97, , T 98F, RR 20 satting 99% on RA. Labs notable for AG 20 and mildly elevated AST/ALT but otherwise unremarkable. EKG showed Afib w/ RVR and RBBB. CXR mild cardiomegaly with no consolidations.   Pt received TXA and IV cardizem then given PO. Admitted to telemetry.

## 2023-03-10 LAB
A1C WITH ESTIMATED AVERAGE GLUCOSE RESULT: 5.7 % — HIGH (ref 4–5.6)
ALBUMIN SERPL ELPH-MCNC: 4.2 G/DL — SIGNIFICANT CHANGE UP (ref 3.5–5.2)
ALP SERPL-CCNC: 75 U/L — SIGNIFICANT CHANGE UP (ref 30–115)
ALT FLD-CCNC: 56 U/L — HIGH (ref 0–41)
ANION GAP SERPL CALC-SCNC: 13 MMOL/L — SIGNIFICANT CHANGE UP (ref 7–14)
APTT BLD: 46 SEC — HIGH (ref 27–39.2)
AST SERPL-CCNC: 41 U/L — SIGNIFICANT CHANGE UP (ref 0–41)
BASOPHILS # BLD AUTO: 0.04 K/UL — SIGNIFICANT CHANGE UP (ref 0–0.2)
BASOPHILS NFR BLD AUTO: 0.4 % — SIGNIFICANT CHANGE UP (ref 0–1)
BILIRUB SERPL-MCNC: 1.2 MG/DL — SIGNIFICANT CHANGE UP (ref 0.2–1.2)
BUN SERPL-MCNC: 16 MG/DL — SIGNIFICANT CHANGE UP (ref 10–20)
CALCIUM SERPL-MCNC: 9.4 MG/DL — SIGNIFICANT CHANGE UP (ref 8.4–10.5)
CHLORIDE SERPL-SCNC: 99 MMOL/L — SIGNIFICANT CHANGE UP (ref 98–110)
CHOLEST SERPL-MCNC: 143 MG/DL — SIGNIFICANT CHANGE UP
CO2 SERPL-SCNC: 25 MMOL/L — SIGNIFICANT CHANGE UP (ref 17–32)
CREAT SERPL-MCNC: 1 MG/DL — SIGNIFICANT CHANGE UP (ref 0.7–1.5)
D DIMER BLD IA.RAPID-MCNC: 242 NG/ML DDU — HIGH
EGFR: 86 ML/MIN/1.73M2 — SIGNIFICANT CHANGE UP
EOSINOPHIL # BLD AUTO: 0.11 K/UL — SIGNIFICANT CHANGE UP (ref 0–0.7)
EOSINOPHIL NFR BLD AUTO: 1.2 % — SIGNIFICANT CHANGE UP (ref 0–8)
ESTIMATED AVERAGE GLUCOSE: 117 MG/DL — HIGH (ref 68–114)
GLUCOSE SERPL-MCNC: 103 MG/DL — HIGH (ref 70–99)
HCT VFR BLD CALC: 51 % — SIGNIFICANT CHANGE UP (ref 42–52)
HCV AB S/CO SERPL IA: 0.04 COI — SIGNIFICANT CHANGE UP
HCV AB SERPL-IMP: SIGNIFICANT CHANGE UP
HDLC SERPL-MCNC: 47 MG/DL — SIGNIFICANT CHANGE UP
HGB BLD-MCNC: 17.5 G/DL — SIGNIFICANT CHANGE UP (ref 14–18)
IMM GRANULOCYTES NFR BLD AUTO: 0.4 % — HIGH (ref 0.1–0.3)
INR BLD: 1.08 RATIO — SIGNIFICANT CHANGE UP (ref 0.65–1.3)
LIPID PNL WITH DIRECT LDL SERPL: 79 MG/DL — SIGNIFICANT CHANGE UP
LYMPHOCYTES # BLD AUTO: 2.03 K/UL — SIGNIFICANT CHANGE UP (ref 1.2–3.4)
LYMPHOCYTES # BLD AUTO: 21.3 % — SIGNIFICANT CHANGE UP (ref 20.5–51.1)
MAGNESIUM SERPL-MCNC: 2 MG/DL — SIGNIFICANT CHANGE UP (ref 1.8–2.4)
MCHC RBC-ENTMCNC: 34.3 G/DL — SIGNIFICANT CHANGE UP (ref 32–37)
MCHC RBC-ENTMCNC: 35.1 PG — HIGH (ref 27–31)
MCV RBC AUTO: 102.4 FL — HIGH (ref 80–94)
MONOCYTES # BLD AUTO: 1.04 K/UL — HIGH (ref 0.1–0.6)
MONOCYTES NFR BLD AUTO: 10.9 % — HIGH (ref 1.7–9.3)
NEUTROPHILS # BLD AUTO: 6.27 K/UL — SIGNIFICANT CHANGE UP (ref 1.4–6.5)
NEUTROPHILS NFR BLD AUTO: 65.8 % — SIGNIFICANT CHANGE UP (ref 42.2–75.2)
NON HDL CHOLESTEROL: 96 MG/DL — SIGNIFICANT CHANGE UP
NRBC # BLD: 0 /100 WBCS — SIGNIFICANT CHANGE UP (ref 0–0)
NT-PROBNP SERPL-SCNC: 674 PG/ML — HIGH (ref 0–300)
PLATELET # BLD AUTO: 197 K/UL — SIGNIFICANT CHANGE UP (ref 130–400)
POTASSIUM SERPL-MCNC: 4.1 MMOL/L — SIGNIFICANT CHANGE UP (ref 3.5–5)
POTASSIUM SERPL-SCNC: 4.1 MMOL/L — SIGNIFICANT CHANGE UP (ref 3.5–5)
PROT SERPL-MCNC: 7.1 G/DL — SIGNIFICANT CHANGE UP (ref 6–8)
PROTHROM AB SERPL-ACNC: 12.4 SEC — SIGNIFICANT CHANGE UP (ref 9.95–12.87)
RBC # BLD: 4.98 M/UL — SIGNIFICANT CHANGE UP (ref 4.7–6.1)
RBC # FLD: 14.9 % — HIGH (ref 11.5–14.5)
SODIUM SERPL-SCNC: 137 MMOL/L — SIGNIFICANT CHANGE UP (ref 135–146)
TRIGL SERPL-MCNC: 89 MG/DL — SIGNIFICANT CHANGE UP
TROPONIN T SERPL-MCNC: <0.01 NG/ML — SIGNIFICANT CHANGE UP
TSH SERPL-MCNC: 3.77 UIU/ML — SIGNIFICANT CHANGE UP (ref 0.27–4.2)
WBC # BLD: 9.53 K/UL — SIGNIFICANT CHANGE UP (ref 4.8–10.8)
WBC # FLD AUTO: 9.53 K/UL — SIGNIFICANT CHANGE UP (ref 4.8–10.8)

## 2023-03-10 PROCEDURE — 93306 TTE W/DOPPLER COMPLETE: CPT | Mod: 26

## 2023-03-10 PROCEDURE — 99223 1ST HOSP IP/OBS HIGH 75: CPT

## 2023-03-10 PROCEDURE — 93325 DOPPLER ECHO COLOR FLOW MAPG: CPT | Mod: 26,59

## 2023-03-10 PROCEDURE — 93312 ECHO TRANSESOPHAGEAL: CPT | Mod: 26

## 2023-03-10 PROCEDURE — 93010 ELECTROCARDIOGRAM REPORT: CPT

## 2023-03-10 PROCEDURE — 99233 SBSQ HOSP IP/OBS HIGH 50: CPT

## 2023-03-10 PROCEDURE — 92960 CARDIOVERSION ELECTRIC EXT: CPT

## 2023-03-10 RX ORDER — APIXABAN 2.5 MG/1
5 TABLET, FILM COATED ORAL EVERY 12 HOURS
Refills: 0 | Status: DISCONTINUED | OUTPATIENT
Start: 2023-03-10 | End: 2023-03-10

## 2023-03-10 RX ORDER — METOPROLOL TARTRATE 50 MG
12.5 TABLET ORAL EVERY 8 HOURS
Refills: 0 | Status: DISCONTINUED | OUTPATIENT
Start: 2023-03-10 | End: 2023-03-10

## 2023-03-10 RX ORDER — FUROSEMIDE 40 MG
40 TABLET ORAL DAILY
Refills: 0 | Status: DISCONTINUED | OUTPATIENT
Start: 2023-03-11 | End: 2023-03-11

## 2023-03-10 RX ORDER — AMIODARONE HYDROCHLORIDE 400 MG/1
1 TABLET ORAL
Qty: 450 | Refills: 0 | Status: DISCONTINUED | OUTPATIENT
Start: 2023-03-10 | End: 2023-03-11

## 2023-03-10 RX ORDER — FUROSEMIDE 40 MG
40 TABLET ORAL DAILY
Refills: 0 | Status: DISCONTINUED | OUTPATIENT
Start: 2023-03-10 | End: 2023-03-10

## 2023-03-10 RX ORDER — ENOXAPARIN SODIUM 100 MG/ML
100 INJECTION SUBCUTANEOUS EVERY 12 HOURS
Refills: 0 | Status: DISCONTINUED | OUTPATIENT
Start: 2023-03-10 | End: 2023-03-10

## 2023-03-10 RX ORDER — AMIODARONE HYDROCHLORIDE 400 MG/1
0.5 TABLET ORAL
Qty: 450 | Refills: 0 | Status: DISCONTINUED | OUTPATIENT
Start: 2023-03-10 | End: 2023-03-11

## 2023-03-10 RX ORDER — METOPROLOL TARTRATE 50 MG
25 TABLET ORAL THREE TIMES A DAY
Refills: 0 | Status: DISCONTINUED | OUTPATIENT
Start: 2023-03-10 | End: 2023-03-11

## 2023-03-10 RX ORDER — AMIODARONE HYDROCHLORIDE 400 MG/1
150 TABLET ORAL ONCE
Refills: 0 | Status: COMPLETED | OUTPATIENT
Start: 2023-03-10 | End: 2023-03-10

## 2023-03-10 RX ORDER — ENOXAPARIN SODIUM 100 MG/ML
110 INJECTION SUBCUTANEOUS EVERY 12 HOURS
Refills: 0 | Status: DISCONTINUED | OUTPATIENT
Start: 2023-03-11 | End: 2023-03-11

## 2023-03-10 RX ADMIN — AMIODARONE HYDROCHLORIDE 33.3 MG/MIN: 400 TABLET ORAL at 18:12

## 2023-03-10 RX ADMIN — AMIODARONE HYDROCHLORIDE 16.7 MG/MIN: 400 TABLET ORAL at 23:01

## 2023-03-10 RX ADMIN — PANTOPRAZOLE SODIUM 40 MILLIGRAM(S): 20 TABLET, DELAYED RELEASE ORAL at 06:07

## 2023-03-10 RX ADMIN — Medication 12.5 MILLIGRAM(S): at 13:12

## 2023-03-10 RX ADMIN — LOSARTAN POTASSIUM 50 MILLIGRAM(S): 100 TABLET, FILM COATED ORAL at 06:07

## 2023-03-10 RX ADMIN — APIXABAN 5 MILLIGRAM(S): 2.5 TABLET, FILM COATED ORAL at 13:14

## 2023-03-10 RX ADMIN — AMIODARONE HYDROCHLORIDE 600 MILLIGRAM(S): 400 TABLET ORAL at 18:12

## 2023-03-10 RX ADMIN — Medication 60 MILLIGRAM(S): at 12:08

## 2023-03-10 RX ADMIN — Medication 60 MILLIGRAM(S): at 00:00

## 2023-03-10 RX ADMIN — Medication 60 MILLIGRAM(S): at 06:07

## 2023-03-10 RX ADMIN — Medication 40 MILLIGRAM(S): at 03:11

## 2023-03-10 RX ADMIN — Medication 25 MILLIGRAM(S): at 23:00

## 2023-03-10 NOTE — CONSULT NOTE ADULT - SUBJECTIVE AND OBJECTIVE BOX
Patient is a 62y old  Male who presents with a chief complaint of Afib w/ RVR, epistaxis (09 Mar 2023 22:40)    HPI: Patient is a 62M with PMHx of HTN presents to the ED for epistaxis and palpitations x1 day. Pt reports that he had a nosebleed yesterday that has not stopped since then, so he presented for evaluation. He also complains of palpitations and an "anxious feeling" intermittent over the past few months. On further questioning pt reports that he hasn't seen a PCP in over a year, and during that period he has been having orthopnea, exertional dyspnea, and occasional LE swelling. He was on HTN meds but ran out (and was titrating them) a month ago and has been using his girlfriend metoprolol 25mg qd since then. He denies any CP, abd pain, N/V/C/D, recent travel or sick contacts, or any  symptoms.    PAST MEDICAL & SURGICAL HISTORY:  Hypertension    PREVIOUS DIAGNOSTIC TESTING:      ECHO  FINDINGS:    STRESS  FINDINGS:    CATHETERIZATION  FINDINGS:    ELECTROPHYSIOLOGY STUDY  FINDINGS:    CAROTID ULTRASOUND:  FINDINGS    VENOUS DUPLEX SCAN:  FINDINGS:    CHEST CT PULMONARY ANGIO with IV Contrast:  FINDINGS:    MEDICATIONS  (STANDING):  diltiazem    Tablet 60 milliGRAM(s) Oral every 6 hours  enoxaparin Injectable 100 milliGRAM(s) SubCutaneous every 12 hours  furosemide   Injectable 40 milliGRAM(s) IV Push daily  losartan 50 milliGRAM(s) Oral daily  metoprolol tartrate 12.5 milliGRAM(s) Oral every 8 hours  pantoprazole    Tablet 40 milliGRAM(s) Oral before breakfast    MEDICATIONS  (PRN):  acetaminophen     Tablet .. 650 milliGRAM(s) Oral every 6 hours PRN Temp greater or equal to 38C (100.4F), Mild Pain (1 - 3)  melatonin 5 milliGRAM(s) Oral at bedtime PRN Insomnia      FAMILY HISTORY: No significant hx    SOCIAL HISTORY: No smoking, ETOH or illicit drug use    Past Surgical History: None    Allergies:  No Known Allergies      REVIEW OF SYSTEMS:  CONSTITUTIONAL: No fever, weight loss, chills, shakes, or fatigue  RESPIRATORY: No cough, wheezing, hemoptysis, + shortness of breath  CARDIOVASCULAR: +dyspnea, palpitations; No chest pain, dizziness, syncope, paroxysmal nocturnal dyspnea, orthopnea, or arm or leg swelling  GASTROINTESTINAL: No abdominal  or epigastric pain, nausea, vomiting, hematemesis, diarrhea, constipation, melena or bright red blood.  NEUROLOGICAL: No headaches, memory loss, slurred speech, limb weakness, loss of strength, numbness, or tremors  MUSCULOSKELETAL: No joint pain or swelling, muscle, back, or extremity pain      Vital Signs Last 24 Hrs  T(C): 36.7 (10 Mar 2023 07:48), Max: 36.9 (09 Mar 2023 13:04)  T(F): 98 (10 Mar 2023 07:48), Max: 98.4 (09 Mar 2023 13:04)  HR: 105 (10 Mar 2023 07:48) (105 - 172)  BP: 144/77 (10 Mar 2023 07:48) (135/91 - 190/97)  BP(mean): --  RR: 18 (10 Mar 2023 07:48) (14 - 20)  SpO2: 97% (10 Mar 2023 07:48) (96% - 99%)    Parameters below as of 10 Mar 2023 06:41  Patient On (Oxygen Delivery Method): room air        PHYSICAL EXAM:    GENERAL: In no apparent distress, well nourished, and hydrated.  ENT: No active epistaxis  NECK: Supple, No JVD   HEART: Irregular rate and rhythm; No murmurs, rubs, or gallops.  PULMONARY: Clear to auscultation and perfusion.  No rales, wheezing, or rhonchi bilaterally.  EXTREMITIES:  2+ Peripheral Pulses, no LE edema BL  NEUROLOGICAL: Grossly nonfocal      INTERPRETATION OF TELEMETRY: -130s    ECG:  < from: 12 Lead ECG (03.10.23 @ 01:40) >  Ventricular Rate 97 BPM    Atrial Rate 115 BPM    QRS Duration 130 ms    Q-T Interval 386 ms    QTC Calculation(Bazett) 490 ms    R Axis -30 degrees    T Axis -12 degrees    Diagnosis Line Atrial fibrillation  Left axis deviation  Right bundle branch block  Abnormal ECG    Confirmed by JAMA MIN MD ([id number]) on 3/10/2023 6:51:49 AM    < end of copied text >      I&O's Detail      LABS:                        17.5   9.53  )-----------( 197      ( 10 Mar 2023 08:00 )             51.0     03-10    137  |  99  |  16  ----------------------------<  103<H>  4.1   |  25  |  1.0    Ca    9.4      10 Mar 2023 08:00  Mg     2.0     03-10    TPro  7.1  /  Alb  4.2  /  TBili  1.2  /  DBili  x   /  AST  41  /  ALT  56<H>  /  AlkPhos  75  03-10        PT/INR - ( 10 Mar 2023 08:00 )   PT: 12.40 sec;   INR: 1.08 ratio         PTT - ( 10 Mar 2023 08:00 )  PTT:46.0 sec    BNP  I&O's Detail    Daily     Daily     RADIOLOGY & ADDITIONAL STUDIES: Patient is a 62y old  Male who presents with a chief complaint of Afib w/ RVR, epistaxis (09 Mar 2023 22:40)    HPI: Patient is a 62M with PMHx of HTN presents to the ED for epistaxis and palpitations x1 day. Pt reports that he had a nosebleed yesterday that has not stopped since then, so he presented for evaluation. He also complains of palpitations and an "anxious feeling" intermittent over the past few months. On further questioning pt reports that he hasn't seen a PCP in over a year, and during that period he has been having orthopnea, exertional dyspnea, and occasional LE swelling. He was on HTN meds but ran out (and was titrating them) a month ago and has been using his girlfriend metoprolol 25mg qd since then. He denies any CP, abd pain, N/V/C/D, recent travel or sick contacts, or any  symptoms.    PAST MEDICAL & SURGICAL HISTORY:  Hypertension    PREVIOUS DIAGNOSTIC TESTING:      ECHO  FINDINGS:  < from: TTE Echo Complete w/ Contrast w/ Doppler (03.10.23 @ 16:49) >   1. Moderately decreased global left ventricular systolic function with   mild concentric left ventricular hypertrophy.. Left ventricular ejection fraction, by visual estimation, is 35 to 40%.   2. Endocardial visualization was enhancedwith intravenous echo contrast. There is no evidence of LV thrombus.   3. Diastolic function could not be assessed in this study due to atrial arrhythmia.   4. Normal right ventricular size with moderately reduced systolic   function.   5. Mildly enlarged left atrium.   6. No hemodynamically significant valvular disease.   7. Mild pulmonary hypertension (PASP = 42mmHg).   8. No pericardial effusion.   9. NB: patient was in rapid atrial fibrillation during this exam.    < end of copied text >      STRESS  FINDINGS:    CATHETERIZATION  FINDINGS:    ELECTROPHYSIOLOGY STUDY  FINDINGS:    CAROTID ULTRASOUND:  FINDINGS    VENOUS DUPLEX SCAN:  FINDINGS:    CHEST CT PULMONARY ANGIO with IV Contrast:  FINDINGS:    MEDICATIONS  (STANDING):  diltiazem    Tablet 60 milliGRAM(s) Oral every 6 hours  enoxaparin Injectable 100 milliGRAM(s) SubCutaneous every 12 hours  furosemide   Injectable 40 milliGRAM(s) IV Push daily  losartan 50 milliGRAM(s) Oral daily  metoprolol tartrate 12.5 milliGRAM(s) Oral every 8 hours  pantoprazole    Tablet 40 milliGRAM(s) Oral before breakfast    MEDICATIONS  (PRN):  acetaminophen     Tablet .. 650 milliGRAM(s) Oral every 6 hours PRN Temp greater or equal to 38C (100.4F), Mild Pain (1 - 3)  melatonin 5 milliGRAM(s) Oral at bedtime PRN Insomnia      FAMILY HISTORY: No significant hx    SOCIAL HISTORY: No smoking, ETOH or illicit drug use    Past Surgical History: None    Allergies:  No Known Allergies      REVIEW OF SYSTEMS:  CONSTITUTIONAL: No fever, weight loss, chills, shakes, or fatigue  RESPIRATORY: No cough, wheezing, hemoptysis, + shortness of breath  CARDIOVASCULAR: +dyspnea, palpitations; No chest pain, dizziness, syncope, paroxysmal nocturnal dyspnea, orthopnea, or arm or leg swelling  GASTROINTESTINAL: No abdominal  or epigastric pain, nausea, vomiting, hematemesis, diarrhea, constipation, melena or bright red blood.  NEUROLOGICAL: No headaches, memory loss, slurred speech, limb weakness, loss of strength, numbness, or tremors  MUSCULOSKELETAL: No joint pain or swelling, muscle, back, or extremity pain      Vital Signs Last 24 Hrs  T(C): 36.7 (10 Mar 2023 07:48), Max: 36.9 (09 Mar 2023 13:04)  T(F): 98 (10 Mar 2023 07:48), Max: 98.4 (09 Mar 2023 13:04)  HR: 105 (10 Mar 2023 07:48) (105 - 172)  BP: 144/77 (10 Mar 2023 07:48) (135/91 - 190/97)  BP(mean): --  RR: 18 (10 Mar 2023 07:48) (14 - 20)  SpO2: 97% (10 Mar 2023 07:48) (96% - 99%)    Parameters below as of 10 Mar 2023 06:41  Patient On (Oxygen Delivery Method): room air        PHYSICAL EXAM:    GENERAL: In no apparent distress, well nourished, and hydrated.  ENT: No active epistaxis  NECK: Supple, No JVD   HEART: Irregular rate and rhythm; tachycardic  PULMONARY: Clear to auscultation and perfusion.  No rales, wheezing, or rhonchi bilaterally.  EXTREMITIES:  2+ Peripheral Pulses, no LE edema BL  NEUROLOGICAL: Grossly nonfocal      INTERPRETATION OF TELEMETRY: -130s    ECG:  < from: 12 Lead ECG (03.10.23 @ 01:40) >  Ventricular Rate 97 BPM    Atrial Rate 115 BPM    QRS Duration 130 ms    Q-T Interval 386 ms    QTC Calculation(Bazett) 490 ms    R Axis -30 degrees    T Axis -12 degrees    Diagnosis Line Atrial fibrillation  Left axis deviation  Right bundle branch block  Abnormal ECG    Confirmed by JAMA MIN MD ([id number]) on 3/10/2023 6:51:49 AM    < end of copied text >      I&O's Detail      LABS:                        17.5   9.53  )-----------( 197      ( 10 Mar 2023 08:00 )             51.0     03-10    137  |  99  |  16  ----------------------------<  103<H>  4.1   |  25  |  1.0    Ca    9.4      10 Mar 2023 08:00  Mg     2.0     03-10    TPro  7.1  /  Alb  4.2  /  TBili  1.2  /  DBili  x   /  AST  41  /  ALT  56<H>  /  AlkPhos  75  03-10        PT/INR - ( 10 Mar 2023 08:00 )   PT: 12.40 sec;   INR: 1.08 ratio         PTT - ( 10 Mar 2023 08:00 )  PTT:46.0 sec    BNP  I&O's Detail    Daily     Daily     RADIOLOGY & ADDITIONAL STUDIES:

## 2023-03-10 NOTE — PROGRESS NOTE ADULT - ASSESSMENT
62M with PMHx of HTN presents to the ED for epistaxis and palpitations x1 day. Pt reports that he had a nosebleed yesterday that has not stopped since then, so he presented for evaluation. He also complains of palpitations and an "anxious feeling" intermittent over the past few months. On further questioning pt reports that he hasn't seen a PCP in over a year, and during that period he has been having orthopnea, exertional dyspnea, and occasional LE swelling.     #New-onset  Paroxysmal Afib w/ RVR  #acute  HFrEF, possible 2/2 ischemic event and in Afib with RVR   - , /97, months of intermittent palpitations, orthopnea, LE swelling, exertional dyspnea  - EKG Afib w/ RVR and RBBB  - CXR mild cardiomegaly but no consolidations/effusions  - s/p cardizem IV and PO   - Admit to telemetry  - CHADsVASC 2-3 (HTN, CHF)   - a1c 5.7  - Mild transaminitis likely 2/2 hepatic congestion  - F/u TTE, TSH  - continue lasix 40 mg intravenous  - Strict I&Os, daily weights  - EP did cardioversion today, unsuccessful, reccs followed by team, started on amio ggt  - cardiology consult for possible ischemic work up  - eliquis DCed as if pt needs cath, started back on lovenox 110 mg BID, monitor for bleeding  - Discussed benefits and risks of starting anticoagulation to prevent strokes from Afib/Aflutter including risk of excessively bleeding gums or nose bleeds, hematuria,  hemorrhagic stroke, GI bleed,  excessive bleeding after trauma or cuts and even death. Advised seek medical intervention immediately. Pt decided to start anticoagulation given benefits outweighs risk.  - EtOH use (2-3 beers/day) and suspected SANJU,  outpatient sleep study  - dimer neg   - hgb stable   - trop neg x1, trend     #HTN  #HTN Urgency due to medication non-compliance  #Epistaxis  - Presented with /97 and mild epistaxis   - BP improved to 135/91 in ED  - s/p TXA for epistaxis  - Pt needs close outpatient PCP f/u  - furosemide   Injectable 40 milliGRAM(s) IV Push daily  - losartan 50 milliGRAM(s) Oral daily  - metoprolol tartrate 25 milliGRAM(s) Oral three times a day    #preDM- 5.7- diet and exercise     #Progress Note Handoff  Pending (specify):  follow cardiology, ep, cardiac telemonitoring, bleeding   Family discussion: house staff updated pt family  Disposition: Pt will need primary care physician appointment at [facility], home    Decision to admit the pt is based on acuity as above   High risk given above acuity and comorbidities, labs reviewed, dw cardiology

## 2023-03-10 NOTE — CHART NOTE - NSCHARTNOTEFT_GEN_A_CORE
POST OPERATIVE PROCEDURAL DOCUMENTATION  PRE-OP DIAGNOSIS:  Afib    POST-OP DIAGNOSIS:  No evidence of DEANN thrombus  Unsuccessful conversion to NSR after DCCV x2    PROCEDURE: Transesophageal echocardiogram    Primary Physician: Dr. Neal  Assistant: Dr. Torres     ANESTHESIA TYPE  [  ] General Anesthesia  [ x ] Conscious Sedation  [  ] Local/Regional    CONDITION  [  ] Critical  [  ] Serious  [  ] Fair  [ x ] Good    SPECIMENS REMOVED (IF APPLICABLE): N/A    IMPLANTS (IF APPLICABLE): None    ESTIMATED BLOOD LOSS: None    COMPLICATIONS: None      FINDINGS:    After risks and benefits of procedures were explained, informed consent was obtained and placed in chart. Refer to Anesthesia note for sedation details.  The GABINO probe was passed into the esophagus without difficulty.  Transesophageal and transgastric images were obtained.  The GABINO probe was removed without difficulty and examined.  There was no evidence for bleeding.  The patient tolerated the procedure well without any immediate GABINO-related complications.      Preliminary Findings:  LA: Enlarged   DEANN: Left atrial appendage was clear of clot and smoke.  LV: LVEF was estimated at 35-40%      Patient unsuccessfully converted to sinus rhythm with synchronized  200 J of direct current cardioversion x2    DIAGNOSIS/IMPRESSION:  No evidence of DEANN thrombus  Unsuccessful conversion to NSR after DCCV x2  LVEF 35-40%    PLAN OF CARE:  Return to inpatient bed   load with amiodarone, start amiodarone gtt then  mg BID for 2 weeks then 200 mg daily   increase metoprolol to 25 TID  d/c cardizem as EF is low   [facility]   General cardiology eval for HFrEF and ischemic work up.   Return for another Trial of cardioversion on Monday (patient on the schedule), NPO after midnight for sedation and repeat COVID on Sunday

## 2023-03-10 NOTE — CONSULT NOTE ADULT - ASSESSMENT
Assessment: 63 yo M with hx of HTN p/w epistaxis and palpitations with SOB. Admits to VAZQUEZ/LE edema over a year. Found to be in new onset AF RVR    Impression:  New Onset AF RVR  CHADSVASC = 1  HTN    Plan:  - ENT consult for clearance to start anticoagulation  - Once cleared start Eliquis 5mg Q12h  - If patient can be cleared and get dose of Eliquis today, keep NPO for GABINO/DCCV  - Continue Cardizem 60mg Q6h then switch to 240mg  - Cont Metoprolol 12.5mg Q12h, can increase as BP tolerates  - Check TSH, free T4  - TTE  - Cont tele monitoring  - Monitor electrolytes, maintain WNL  - Will follow

## 2023-03-10 NOTE — PROGRESS NOTE ADULT - SUBJECTIVE AND OBJECTIVE BOX
Patient is a 62y old  Male who presents with a chief complaint of Afib w/ RVR, epistaxis (03-10-23)      Pt seen and examined at bedside. No CP or SOB.  Pt states that he snores at night. Has not seen any physician for a long time.       PAST MEDICAL & SURGICAL HISTORY:      VITAL SIGNS (Last 24 hrs):  T(C): 36.7 (03-10-23 @ 15:45), Max: 36.8 (03-10-23 @ 00:19)  HR: 105 (03-10-23 @ 15:45) (105 - 135)  BP: 144/77 (03-10-23 @ 15:45) (140/85 - 144/109)  RR: 18 (03-10-23 @ 15:45) (18 - 18)  SpO2: 97% (03-10-23 @ 15:45) (96% - 98%)  Wt(kg): --  Daily Height in cm: 175.26 (10 Mar 2023 15:45)    Daily     I&O's Summary      PHYSICAL EXAM:  GENERAL: NAD, well-developed  HEAD:  Atraumatic, Normocephalic  EYES: EOMI, PERRLA, conjunctiva and sclera clear  NECK: Supple, No JVD  CHEST/LUNG: Clear to auscultation bilaterally; No wheeze  HEART: irregular rate and rhythm; No murmurs, rubs, or gallops  ABDOMEN: Soft, Nontender, Nondistended; Bowel sounds present  EXTREMITIES:  2+ Peripheral Pulses, No clubbing, cyanosis, or edema  PSYCH: AAOx3  NEUROLOGY: non-focal  SKIN: No rashes or lesions    Labs Reviewed  Spoke to patient in regards to abnormal labs.    CBC Full  -  ( 10 Mar 2023 08:00 )  WBC Count : 9.53 K/uL  Hemoglobin : 17.5 g/dL  Hematocrit : 51.0 %  Platelet Count - Automated : 197 K/uL  Mean Cell Volume : 102.4 fL  Mean Cell Hemoglobin : 35.1 pg  Mean Cell Hemoglobin Concentration : 34.3 g/dL  Auto Neutrophil # : 6.27 K/uL  Auto Lymphocyte # : 2.03 K/uL  Auto Monocyte # : 1.04 K/uL  Auto Eosinophil # : 0.11 K/uL  Auto Basophil # : 0.04 K/uL  Auto Neutrophil % : 65.8 %  Auto Lymphocyte % : 21.3 %  Auto Monocyte % : 10.9 %  Auto Eosinophil % : 1.2 %  Auto Basophil % : 0.4 %    BMP:    03-10 @ 08:00    Blood Urea Nitrogen - 16  Calcium - 9.4  Carbond Dioxide - 25  Chloride - 99  Creatinine - 1.0  Glucose - 103  Potassium - 4.1  Sodium - 137      Hemoglobin A1c -   PT/INR - ( 10 Mar 2023 08:00 )   PT: 12.40 sec;   INR: 1.08 ratio         PTT - ( 10 Mar 2023 08:00 )  PTT:46.0 sec  Urine Culture:        COVID Labs  CRP:      D-Dimer:  242 ng/mL DDU (03-10-23 @ 11:34)      Imaging reviewed independently and reviewed read  < from: Xray Chest 1 View- PORTABLE-Urgent (03.09.23 @ 14:37) >  Impression:    Mild cardiomegaly. No consolidation, effusion or pneumothorax.    < end of copied text >    < from: TTE Echo Complete w/ Contrast w/ Doppler (03.10.23 @ 16:49) >  Summary:   1. Moderately decreased global left ventricular systolic function with   mild concentric left ventricular hypertrophy.. Left ventricular ejection   fraction, by visual estimation, is 35 to 40%.   2. Endocardial visualization was enhancedwith intravenous echo   contrast. There is no evidence of LV thrombus.   3. Diastolic function could not be assessed in this study due to atrial   arrhythmia.   4. Normal right ventricular size with moderately reduced systolic   function.   5. Mildlyenlarged left atrium.   6. No hemodynamically significant valvular disease.   7. Mild pulmonary hypertension (PASP = 42mmHg).   8. No pericardial effusion.   9. NB: patient was in rapid atrial fibrillation during this exam.    < end of copied text >        MEDICATIONS  (STANDING):  aMIOdarone Infusion 1 mG/Min (33.3 mL/Hr) IV Continuous <Continuous>  aMIOdarone Infusion 0.5 mG/Min (16.7 mL/Hr) IV Continuous <Continuous>  aMIOdarone IVPB 150 milliGRAM(s) IV Intermittent once  apixaban 5 milliGRAM(s) Oral every 12 hours  furosemide   Injectable 40 milliGRAM(s) IV Push daily  losartan 50 milliGRAM(s) Oral daily  metoprolol tartrate 25 milliGRAM(s) Oral three times a day  pantoprazole    Tablet 40 milliGRAM(s) Oral before breakfast    MEDICATIONS  (PRN):  acetaminophen     Tablet .. 650 milliGRAM(s) Oral every 6 hours PRN Temp greater or equal to 38C (100.4F), Mild Pain (1 - 3)  melatonin 5 milliGRAM(s) Oral at bedtime PRN Insomnia

## 2023-03-11 LAB
ALBUMIN SERPL ELPH-MCNC: 4 G/DL — SIGNIFICANT CHANGE UP (ref 3.5–5.2)
ALP SERPL-CCNC: 75 U/L — SIGNIFICANT CHANGE UP (ref 30–115)
ALT FLD-CCNC: 52 U/L — HIGH (ref 0–41)
ANION GAP SERPL CALC-SCNC: 12 MMOL/L — SIGNIFICANT CHANGE UP (ref 7–14)
AST SERPL-CCNC: 42 U/L — HIGH (ref 0–41)
BASOPHILS # BLD AUTO: 0.05 K/UL — SIGNIFICANT CHANGE UP (ref 0–0.2)
BASOPHILS NFR BLD AUTO: 0.4 % — SIGNIFICANT CHANGE UP (ref 0–1)
BILIRUB SERPL-MCNC: 1.4 MG/DL — HIGH (ref 0.2–1.2)
BUN SERPL-MCNC: 11 MG/DL — SIGNIFICANT CHANGE UP (ref 10–20)
CALCIUM SERPL-MCNC: 8.9 MG/DL — SIGNIFICANT CHANGE UP (ref 8.4–10.5)
CHLORIDE SERPL-SCNC: 98 MMOL/L — SIGNIFICANT CHANGE UP (ref 98–110)
CO2 SERPL-SCNC: 28 MMOL/L — SIGNIFICANT CHANGE UP (ref 17–32)
CREAT SERPL-MCNC: 1.2 MG/DL — SIGNIFICANT CHANGE UP (ref 0.7–1.5)
EGFR: 69 ML/MIN/1.73M2 — SIGNIFICANT CHANGE UP
EOSINOPHIL # BLD AUTO: 0.08 K/UL — SIGNIFICANT CHANGE UP (ref 0–0.7)
EOSINOPHIL NFR BLD AUTO: 0.7 % — SIGNIFICANT CHANGE UP (ref 0–8)
GLUCOSE SERPL-MCNC: 111 MG/DL — HIGH (ref 70–99)
HCT VFR BLD CALC: 51.5 % — SIGNIFICANT CHANGE UP (ref 42–52)
HGB BLD-MCNC: 17.6 G/DL — SIGNIFICANT CHANGE UP (ref 14–18)
IMM GRANULOCYTES NFR BLD AUTO: 0.2 % — SIGNIFICANT CHANGE UP (ref 0.1–0.3)
LYMPHOCYTES # BLD AUTO: 16.9 % — LOW (ref 20.5–51.1)
LYMPHOCYTES # BLD AUTO: 2.06 K/UL — SIGNIFICANT CHANGE UP (ref 1.2–3.4)
MAGNESIUM SERPL-MCNC: 1.9 MG/DL — SIGNIFICANT CHANGE UP (ref 1.8–2.4)
MCHC RBC-ENTMCNC: 34.2 G/DL — SIGNIFICANT CHANGE UP (ref 32–37)
MCHC RBC-ENTMCNC: 35.2 PG — HIGH (ref 27–31)
MCV RBC AUTO: 103 FL — HIGH (ref 80–94)
MONOCYTES # BLD AUTO: 1.09 K/UL — HIGH (ref 0.1–0.6)
MONOCYTES NFR BLD AUTO: 9 % — SIGNIFICANT CHANGE UP (ref 1.7–9.3)
NEUTROPHILS # BLD AUTO: 8.85 K/UL — HIGH (ref 1.4–6.5)
NEUTROPHILS NFR BLD AUTO: 72.8 % — SIGNIFICANT CHANGE UP (ref 42.2–75.2)
NRBC # BLD: 0 /100 WBCS — SIGNIFICANT CHANGE UP (ref 0–0)
PHOSPHATE SERPL-MCNC: 4.1 MG/DL — SIGNIFICANT CHANGE UP (ref 2.1–4.9)
PLATELET # BLD AUTO: 190 K/UL — SIGNIFICANT CHANGE UP (ref 130–400)
POTASSIUM SERPL-MCNC: 4.6 MMOL/L — SIGNIFICANT CHANGE UP (ref 3.5–5)
POTASSIUM SERPL-SCNC: 4.6 MMOL/L — SIGNIFICANT CHANGE UP (ref 3.5–5)
PROT SERPL-MCNC: 7.2 G/DL — SIGNIFICANT CHANGE UP (ref 6–8)
RBC # BLD: 5 M/UL — SIGNIFICANT CHANGE UP (ref 4.7–6.1)
RBC # FLD: 14.6 % — HIGH (ref 11.5–14.5)
SODIUM SERPL-SCNC: 138 MMOL/L — SIGNIFICANT CHANGE UP (ref 135–146)
TROPONIN T SERPL-MCNC: <0.01 NG/ML — SIGNIFICANT CHANGE UP
WBC # BLD: 12.16 K/UL — HIGH (ref 4.8–10.8)
WBC # FLD AUTO: 12.16 K/UL — HIGH (ref 4.8–10.8)

## 2023-03-11 PROCEDURE — 93010 ELECTROCARDIOGRAM REPORT: CPT | Mod: 76

## 2023-03-11 PROCEDURE — 99232 SBSQ HOSP IP/OBS MODERATE 35: CPT

## 2023-03-11 PROCEDURE — 99223 1ST HOSP IP/OBS HIGH 75: CPT

## 2023-03-11 PROCEDURE — 99233 SBSQ HOSP IP/OBS HIGH 50: CPT

## 2023-03-11 PROCEDURE — 76705 ECHO EXAM OF ABDOMEN: CPT | Mod: 26

## 2023-03-11 RX ORDER — APIXABAN 2.5 MG/1
5 TABLET, FILM COATED ORAL EVERY 12 HOURS
Refills: 0 | Status: DISCONTINUED | OUTPATIENT
Start: 2023-03-12 | End: 2023-03-16

## 2023-03-11 RX ORDER — LOSARTAN POTASSIUM 100 MG/1
100 TABLET, FILM COATED ORAL DAILY
Refills: 0 | Status: DISCONTINUED | OUTPATIENT
Start: 2023-03-12 | End: 2023-03-12

## 2023-03-11 RX ORDER — LOSARTAN POTASSIUM 100 MG/1
50 TABLET, FILM COATED ORAL ONCE
Refills: 0 | Status: COMPLETED | OUTPATIENT
Start: 2023-03-11 | End: 2023-03-11

## 2023-03-11 RX ORDER — FUROSEMIDE 40 MG
60 TABLET ORAL
Refills: 0 | Status: DISCONTINUED | OUTPATIENT
Start: 2023-03-11 | End: 2023-03-12

## 2023-03-11 RX ORDER — METOPROLOL TARTRATE 50 MG
25 TABLET ORAL
Refills: 0 | Status: COMPLETED | OUTPATIENT
Start: 2023-03-11 | End: 2023-03-12

## 2023-03-11 RX ORDER — AMIODARONE HYDROCHLORIDE 400 MG/1
400 TABLET ORAL EVERY 12 HOURS
Refills: 0 | Status: DISCONTINUED | OUTPATIENT
Start: 2023-03-11 | End: 2023-03-12

## 2023-03-11 RX ORDER — FUROSEMIDE 40 MG
60 TABLET ORAL DAILY
Refills: 0 | Status: DISCONTINUED | OUTPATIENT
Start: 2023-03-11 | End: 2023-03-11

## 2023-03-11 RX ORDER — AMIODARONE HYDROCHLORIDE 400 MG/1
0.5 TABLET ORAL
Qty: 450 | Refills: 0 | Status: DISCONTINUED | OUTPATIENT
Start: 2023-03-11 | End: 2023-03-11

## 2023-03-11 RX ORDER — DAPAGLIFLOZIN 10 MG/1
10 TABLET, FILM COATED ORAL EVERY 24 HOURS
Refills: 0 | Status: DISCONTINUED | OUTPATIENT
Start: 2023-03-11 | End: 2023-03-16

## 2023-03-11 RX ORDER — METOPROLOL TARTRATE 50 MG
25 TABLET ORAL
Refills: 0 | Status: DISCONTINUED | OUTPATIENT
Start: 2023-03-11 | End: 2023-03-11

## 2023-03-11 RX ADMIN — Medication 40 MILLIGRAM(S): at 13:08

## 2023-03-11 RX ADMIN — LOSARTAN POTASSIUM 50 MILLIGRAM(S): 100 TABLET, FILM COATED ORAL at 19:27

## 2023-03-11 RX ADMIN — LOSARTAN POTASSIUM 50 MILLIGRAM(S): 100 TABLET, FILM COATED ORAL at 05:38

## 2023-03-11 RX ADMIN — AMIODARONE HYDROCHLORIDE 16.7 MG/MIN: 400 TABLET ORAL at 07:18

## 2023-03-11 RX ADMIN — ENOXAPARIN SODIUM 110 MILLIGRAM(S): 100 INJECTION SUBCUTANEOUS at 01:53

## 2023-03-11 RX ADMIN — Medication 25 MILLIGRAM(S): at 18:45

## 2023-03-11 RX ADMIN — Medication 25 MILLIGRAM(S): at 23:53

## 2023-03-11 RX ADMIN — PANTOPRAZOLE SODIUM 40 MILLIGRAM(S): 20 TABLET, DELAYED RELEASE ORAL at 11:28

## 2023-03-11 RX ADMIN — AMIODARONE HYDROCHLORIDE 400 MILLIGRAM(S): 400 TABLET ORAL at 20:38

## 2023-03-11 RX ADMIN — Medication 25 MILLIGRAM(S): at 19:27

## 2023-03-11 RX ADMIN — Medication 40 MILLIGRAM(S): at 05:38

## 2023-03-11 RX ADMIN — Medication 25 MILLIGRAM(S): at 05:38

## 2023-03-11 RX ADMIN — ENOXAPARIN SODIUM 110 MILLIGRAM(S): 100 INJECTION SUBCUTANEOUS at 15:40

## 2023-03-11 RX ADMIN — Medication 25 MILLIGRAM(S): at 13:07

## 2023-03-11 NOTE — CONSULT NOTE ADULT - ASSESSMENT
#Afib/flutter w/ RVR on arrival - now rate controlled  [id number] - 2 (AGE, HF)   #New undefined cardiomyopathy - possibly tachycardia-induced, need to rule out ischemia as etiology    - s/p failed DCCV x2 on Friday. Continue with Amio load. Plan for repeat DCCV on Monday  - Anticoagulation with Lovenox/eliquis  - c/w BB/Losartan #Afib/flutter w/ RVR on arrival - now rate controlled  [id number] - 2 (AGE, HF)   #New undefined cardiomyopathy - tachycardia vs ischemia induced   #Epistaxis -resolved    - s/p failed DCCV x2 on Friday. Continue with Amio load. Plan for repeat DCCV on Monday  - Increase metoprolol to 25 q6hr. Titrate to target heart rate <110  - Anticoagulation with Lovenox/eliquis  - Increase IV diuresis to [id number]. Target Net negative balance of 1.5-2L/day. Can increase Lasix to 80mg q12hr if unable to achieve target negative balance  - Will aim for cardiac cath on Monday to clarify the etiology of his undefined cardiomyopathy if euvolemic and able to tolerate lying flat by then  - NPo after midnight on Sunday. Covid PCR Sunday #Afib/flutter w/ RVR on arrival - now rate controlled  [id number] - 2 (AGE, HF)   #New undefined cardiomyopathy - tachycardia vs ischemia induced   #Epistaxis -resolved    - s/p failed DCCV x2 on Friday. Continue with Amio load. Plan for repeat DCCV on Monday  - Increase metoprolol to 25 q6hr. Titrate to target heart rate <110. STOP BB on SUNDAY AT MIDNIGHT prior to his cardioversion  - Anticoagulation with Lovenox/eliquis  - Increase IV diuresis to [id number]. Target Net negative balance of 1.5-2L/day. Can increase Lasix to 80mg q12hr if unable to achieve target negative balance  - Repeat TTE once in NSR. C/w GDMT . Outpt follow up for ischemic work up if no improvement in EF  - Outpt SANJU w/u

## 2023-03-11 NOTE — CONSULT NOTE ADULT - SUBJECTIVE AND OBJECTIVE BOX
Outpt cardiologist:    HPI:  62M with PMHx of HTN presents to the ED for epistaxis and palpitations x1 day. Pt reports that he had a nosebleed yesterday that has not stopped since then, so he presented for evaluation. He also complains of palpitations and an "anxious feeling" intermittent over the past few months. On further questioning pt reports that he hasn't seen a PCP in over a year, and during that period he has been having orthopnea, exertional dyspnea, and occasional LE swelling. He was on HTN meds but ran out (and was titrating them) a month ago and has been using his girlfriend metoprolol 25mg qd since then. He denies any CP, abd pain, N/V/C/D, recent travel or sick contacts, or any  symptoms.    In the ED: /97, , T 98F, RR 20 satting 99% on RA. Labs notable for AG 20 and mildly elevated AST/ALT but otherwise unremarkable. EKG showed Afib w/ RVR and RBBB. CXR mild cardiomegaly with no consolidations.   Pt received TXA and IV cardizem then given PO. Admitted to telemetry.  (09 Mar 2023 22:40)      ---  Cardiology Fellow notes:    Pt with intermittent symptoms of palpitations, exertional dyspnea, and orthopnea for a few months. he has been using his girlfriend's TOprol 25mg  He underwent GABINO/DCCV , but remains in Afib/flutter. Started on AMio and is planned for another DCCV on Monday.       PAST MEDICAL & SURGICAL HISTORY      FAMILY HISTORY:  FAMILY HISTORY:      SOCIAL HISTORY:  Social History:  Active smoker 1PPD  Daily EtOH use 2 beers/day  No substance use (09 Mar 2023 22:40)      ALLERGIES:  No Known Allergies      MEDICATIONS:  aMIOdarone Infusion 1 mG/Min (33.3 mL/Hr) IV Continuous <Continuous>  aMIOdarone Infusion 0.5 mG/Min (16.7 mL/Hr) IV Continuous <Continuous>  enoxaparin Injectable 110 milliGRAM(s) SubCutaneous every 12 hours  furosemide   Injectable 40 milliGRAM(s) IV Push daily  losartan 50 milliGRAM(s) Oral daily  metoprolol tartrate 25 milliGRAM(s) Oral three times a day  pantoprazole    Tablet 40 milliGRAM(s) Oral before breakfast    PRN:  acetaminophen     Tablet .. 650 milliGRAM(s) Oral every 6 hours PRN  melatonin 5 milliGRAM(s) Oral at bedtime PRN      HOME MEDICATIONS:  Home Medications:  Metoprolol Succinate ER 25 mg oral tablet, extended release: 1 tab(s) orally once a day (09 Mar 2023 23:15)      VITALS:   T(F): 98.2 (03-10 @ 23:01), Max: 98.4 ( @ 13:04)  HR: 125 (03-10 @ 23:01) (105 - 172)  BP: 129/93 (03-10 @ 23:01) (129/93 - 190/97)  BP(mean): --  RR: 18 (03-10 @ 23:01) (14 - 20)  SpO2: 97% (03-10 @ 23:01) (96% - 99%)    I&O's Summary      REVIEW OF SYSTEMS:  CONSTITUTIONAL: No weakness, fevers or chills  HEENT: No visual changes, neck/ear pain  RESPIRATORY: No cough, sob  CARDIOVASCULAR: See HPI  GASTROINTESTINAL: No abdominal pain. No nausea, vomiting, diarrhea   GENITOURINARY: No dysuria, frequency or hematuria  NEUROLOGICAL: No new focal deficits  SKIN: No new rashes    PHYSICAL EXAM:  General: Not in distress.  Non-toxic appearing.   HEENT: EOMI  Cardio: irregular, S1, S2, no murmur  Pulm: B/L BS.  No wheezing / crackles / rales  Abdomen: Soft, non-tender, non-distended. Normoactive bowel sounds  Extremities: No edema b/l le  Neuro: A&O x3. No focal deficits    LABS:                        17.5   9.53  )-----------( 197      ( 10 Mar 2023 08:00 )             51.0     03-10    137  |  99  |  16  ----------------------------<  103<H>  4.1   |  25  |  1.0    Ca    9.4      10 Mar 2023 08:00  Mg     2.0     03-10    TPro  7.1  /  Alb  4.2  /  TBili  1.2  /  DBili  x   /  AST  41  /  ALT  56<H>  /  AlkPhos  75  03-10    PT/INR - ( 10 Mar 2023 08:00 )   PT: 12.40 sec;   INR: 1.08 ratio         PTT - ( 10 Mar 2023 08:00 )  PTT:46.0 sec  Troponin T, Serum: <0.01 ng/mL (03-10-23 @ 21:42)  Troponin T, Serum: <0.01 ng/mL (03-10-23 @ 16:57)  Troponin T, Serum: <0.01 ng/mL (03-10-23 @ 11:34)    CARDIAC MARKERS ( 10 Mar 2023 21:42 )  x     / <0.01 ng/mL / x     / x     / x      CARDIAC MARKERS ( 10 Mar 2023 16:57 )  x     / <0.01 ng/mL / x     / x     / x      CARDIAC MARKERS ( 10 Mar 2023 11:34 )  x     / <0.01 ng/mL / x     / x     / x            Troponin trend:      03-10 Chol 143 LDL -- HDL 47 Trig 89  COVID-19 PCR: NotDetec (09 Mar 2023 20:29)      RADIOLOGY:  -CXR:  -TTE:  < from: TTE Echo Complete w/ Contrast w/ Doppler (03.10.23 @ 16:49) >  Summary:   1. Moderately decreased global left ventricular systolic function with   mild concentric left ventricular hypertrophy.. Left ventricular ejection   fraction, by visual estimation, is 35 to 40%.   2. Endocardial visualization was enhancedwith intravenous echo   contrast. There is no evidence of LV thrombus.   3. Diastolic function could not be assessed in this study due to atrial   arrhythmia.   4. Normal right ventricular size with moderately reduced systolic   function.   5. Mildlyenlarged left atrium.   6. No hemodynamically significant valvular disease.   7. Mild pulmonary hypertension (PASP = 42mmHg).   8. No pericardial effusion.   9. NB: patient was in rapid atrial fibrillation during this exam.    < end of copied text >    -CCTA:  -STRESS TEST:  -CATHETERIZATION:  -OTHER:  EC Lead ECG:   Ventricular Rate 97 BPM    Atrial Rate 115 BPM    QRS Duration 130 ms    Q-T Interval 386 ms    QTC Calculation(Bazett) 490 ms    R Axis -30 degrees    T Axis -12 degrees    Diagnosis Line Atrial fibrillation  Left axis deviation  Right bundle branch block  Abnormal ECG    Confirmed by JAMA MIN MD ([id number]) on 3/10/2023 6:51:49 AM (03-10 @ 01:40)      TELEMETRY EVENTS:   Outpt cardiologist:    HPI:  62M with PMHx of HTN presents to the ED for epistaxis and palpitations x1 day. Pt reports that he had a nosebleed yesterday that has not stopped since then, so he presented for evaluation. He also complains of palpitations and an "anxious feeling" intermittent over the past few months. On further questioning pt reports that he hasn't seen a PCP in over a year, and during that period he has been having orthopnea, exertional dyspnea, and occasional LE swelling. He was on HTN meds but ran out (and was titrating them) a month ago and has been using his girlfriend metoprolol 25mg qd since then. He denies any CP, abd pain, N/V/C/D, recent travel or sick contacts, or any  symptoms.    In the ED: /97, , T 98F, RR 20 satting 99% on RA. Labs notable for AG 20 and mildly elevated AST/ALT but otherwise unremarkable. EKG showed Afib w/ RVR and RBBB. CXR mild cardiomegaly with no consolidations.   Pt received TXA and IV cardizem then given PO. Admitted to telemetry.  (09 Mar 2023 22:40)      ---  Cardiology Fellow notes:    Pt with intermittent symptoms of palpitations, exertional dyspnea, and orthopnea for a few months. he has been using his girlfriend's TOprol 25mg  He underwent GABINO/DCCV , but remains in Afib/flutter. Started on AMio and is planned for another DCCV on Monday.       PAST MEDICAL & SURGICAL HISTORY      FAMILY HISTORY:  FAMILY HISTORY:      SOCIAL HISTORY:  Social History:  Active smoker 1PPD  Daily EtOH use 2 beers/day  No substance use (09 Mar 2023 22:40)      ALLERGIES:  No Known Allergies      MEDICATIONS:  aMIOdarone Infusion 1 mG/Min (33.3 mL/Hr) IV Continuous <Continuous>  aMIOdarone Infusion 0.5 mG/Min (16.7 mL/Hr) IV Continuous <Continuous>  enoxaparin Injectable 110 milliGRAM(s) SubCutaneous every 12 hours  furosemide   Injectable 40 milliGRAM(s) IV Push daily  losartan 50 milliGRAM(s) Oral daily  metoprolol tartrate 25 milliGRAM(s) Oral three times a day  pantoprazole    Tablet 40 milliGRAM(s) Oral before breakfast    PRN:  acetaminophen     Tablet .. 650 milliGRAM(s) Oral every 6 hours PRN  melatonin 5 milliGRAM(s) Oral at bedtime PRN      HOME MEDICATIONS:  Home Medications:  Metoprolol Succinate ER 25 mg oral tablet, extended release: 1 tab(s) orally once a day (09 Mar 2023 23:15)      VITALS:   T(F): 98.2 (03-10 @ 23:01), Max: 98.4 ( @ 13:04)  HR: 125 (03-10 @ 23:01) (105 - 172)  BP: 129/93 (03-10 @ 23:01) (129/93 - 190/97)  BP(mean): --  RR: 18 (03-10 @ 23:01) (14 - 20)  SpO2: 97% (03-10 @ 23:01) (96% - 99%)    I&O's Summary      REVIEW OF SYSTEMS:  CONSTITUTIONAL: No weakness, fevers or chills  HEENT: No visual changes, neck/ear pain  RESPIRATORY: No cough, sob  CARDIOVASCULAR: See HPI  GASTROINTESTINAL: No abdominal pain. No nausea, vomiting, diarrhea   GENITOURINARY: No dysuria, frequency or hematuria  NEUROLOGICAL: No new focal deficits  SKIN: No new rashes    PHYSICAL EXAM:  General: Not in distress.  Non-toxic appearing.   HEENT: EOMI  Cardio: regular rhythm ,tachycardic   Pulm: B/L BS.  No wheezing / crackles / rales  Abdomen: Soft, non-tender, non-distended. Normoactive bowel sounds  Extremities: No edema b/l le  Neuro: A&O x3. No focal deficits    LABS:                        17.5   9.53  )-----------( 197      ( 10 Mar 2023 08:00 )             51.0     03-10    137  |  99  |  16  ----------------------------<  103<H>  4.1   |  25  |  1.0    Ca    9.4      10 Mar 2023 08:00  Mg     2.0     03-10    TPro  7.1  /  Alb  4.2  /  TBili  1.2  /  DBili  x   /  AST  41  /  ALT  56<H>  /  AlkPhos  75  03-10    PT/INR - ( 10 Mar 2023 08:00 )   PT: 12.40 sec;   INR: 1.08 ratio         PTT - ( 10 Mar 2023 08:00 )  PTT:46.0 sec  Troponin T, Serum: <0.01 ng/mL (03-10-23 @ 21:42)  Troponin T, Serum: <0.01 ng/mL (03-10-23 @ 16:57)  Troponin T, Serum: <0.01 ng/mL (03-10-23 @ 11:34)    CARDIAC MARKERS ( 10 Mar 2023 21:42 )  x     / <0.01 ng/mL / x     / x     / x      CARDIAC MARKERS ( 10 Mar 2023 16:57 )  x     / <0.01 ng/mL / x     / x     / x      CARDIAC MARKERS ( 10 Mar 2023 11:34 )  x     / <0.01 ng/mL / x     / x     / x            Troponin trend:      03-10 Chol 143 LDL -- HDL 47 Trig 89  COVID-19 PCR: NotDetec (09 Mar 2023 20:29)      RADIOLOGY:  -CXR:  -TTE:  < from: TTE Echo Complete w/ Contrast w/ Doppler (03.10.23 @ 16:49) >  Summary:   1. Moderately decreased global left ventricular systolic function with   mild concentric left ventricular hypertrophy.. Left ventricular ejection   fraction, by visual estimation, is 35 to 40%.   2. Endocardial visualization was enhancedwith intravenous echo   contrast. There is no evidence of LV thrombus.   3. Diastolic function could not be assessed in this study due to atrial   arrhythmia.   4. Normal right ventricular size with moderately reduced systolic   function.   5. Mildlyenlarged left atrium.   6. No hemodynamically significant valvular disease.   7. Mild pulmonary hypertension (PASP = 42mmHg).   8. No pericardial effusion.   9. NB: patient was in rapid atrial fibrillation during this exam.    < end of copied text >    -CCTA:  -STRESS TEST:  -CATHETERIZATION:  -OTHER:  EC Lead ECG:   Ventricular Rate 97 BPM    Atrial Rate 115 BPM    QRS Duration 130 ms    Q-T Interval 386 ms    QTC Calculation(Bazett) 490 ms    R Axis -30 degrees    T Axis -12 degrees    Diagnosis Line Atrial fibrillation  Left axis deviation  Right bundle branch block  Abnormal ECG    Confirmed by JAMA MIN MD ([id number]) on 3/10/2023 6:51:49 AM (03-10 @ 01:40)      TELEMETRY EVENTS: Afib at 120's   Outpt cardiologist:    HPI:  62M with PMHx of HTN presents to the ED for epistaxis and palpitations x1 day. Pt reports that he had a nosebleed yesterday that has not stopped since then, so he presented for evaluation. He also complains of palpitations and an "anxious feeling" intermittent over the past few months. On further questioning pt reports that he hasn't seen a PCP in over a year, and during that period he has been having orthopnea, exertional dyspnea, and occasional LE swelling. He was on HTN meds but ran out (and was titrating them) a month ago and has been using his girlfriend metoprolol 25mg qd since then. He denies any CP, abd pain, N/V/C/D, recent travel or sick contacts, or any  symptoms.    In the ED: /97, , T 98F, RR 20 satting 99% on RA. Labs notable for AG 20 and mildly elevated AST/ALT but otherwise unremarkable. EKG showed Afib w/ RVR and RBBB. CXR mild cardiomegaly with no consolidations.   Pt received TXA and IV cardizem then given PO. Admitted to telemetry.  (09 Mar 2023 22:40)      ---  Cardiology Fellow notes:    Pt with intermittent symptoms of palpitations, exertional dyspnea, and orthopnea for a few months. he has been using his girlfriend's TOprol 25mg  He underwent GABINO/DCCV , but remains in Afib/flutter. Started on AMio and is planned for another DCCV on Monday.       PAST MEDICAL & SURGICAL HISTORY      FAMILY HISTORY:  FAMILY HISTORY:      SOCIAL HISTORY:  Social History:  Active smoker 1PPD  Daily EtOH use 2 beers/day  No substance use (09 Mar 2023 22:40)      ALLERGIES:  No Known Allergies      MEDICATIONS:  aMIOdarone Infusion 1 mG/Min (33.3 mL/Hr) IV Continuous <Continuous>  aMIOdarone Infusion 0.5 mG/Min (16.7 mL/Hr) IV Continuous <Continuous>  enoxaparin Injectable 110 milliGRAM(s) SubCutaneous every 12 hours  furosemide   Injectable 40 milliGRAM(s) IV Push daily  losartan 50 milliGRAM(s) Oral daily  metoprolol tartrate 25 milliGRAM(s) Oral three times a day  pantoprazole    Tablet 40 milliGRAM(s) Oral before breakfast    PRN:  acetaminophen     Tablet .. 650 milliGRAM(s) Oral every 6 hours PRN  melatonin 5 milliGRAM(s) Oral at bedtime PRN      HOME MEDICATIONS:  Home Medications:  Metoprolol Succinate ER 25 mg oral tablet, extended release: 1 tab(s) orally once a day (09 Mar 2023 23:15)      VITALS:   T(F): 98.2 (03-10 @ 23:01), Max: 98.4 ( @ 13:04)  HR: 125 (03-10 @ 23:01) (105 - 172)  BP: 129/93 (03-10 @ 23:01) (129/93 - 190/97)  BP(mean): --  RR: 18 (03-10 @ 23:01) (14 - 20)  SpO2: 97% (03-10 @ 23:01) (96% - 99%)    I&O's Summary      REVIEW OF SYSTEMS:  CONSTITUTIONAL: No weakness, fevers or chills  HEENT: No visual changes, neck/ear pain  RESPIRATORY: No cough, sob  CARDIOVASCULAR: See HPI  GASTROINTESTINAL: No abdominal pain. No nausea, vomiting, diarrhea   GENITOURINARY: No dysuria, frequency or hematuria  NEUROLOGICAL: No new focal deficits  SKIN: No new rashes    PHYSICAL EXAM:  General: Not in distress.  Non-toxic appearing.   HEENT: EOMI  Cardio: regular rhythm ,tachycardic   Pulm: B/L BS.  No wheezing / crackles / rales  Abdomen: Soft, non-tender, non-distended. Normoactive bowel sounds  Extremities: No edema b/l le  Neuro: A&O x3. No focal deficits    LABS:                        17.5   9.53  )-----------( 197      ( 10 Mar 2023 08:00 )             51.0     03-10    137  |  99  |  16  ----------------------------<  103<H>  4.1   |  25  |  1.0    Ca    9.4      10 Mar 2023 08:00  Mg     2.0     03-10    TPro  7.1  /  Alb  4.2  /  TBili  1.2  /  DBili  x   /  AST  41  /  ALT  56<H>  /  AlkPhos  75  03-10    PT/INR - ( 10 Mar 2023 08:00 )   PT: 12.40 sec;   INR: 1.08 ratio         PTT - ( 10 Mar 2023 08:00 )  PTT:46.0 sec  Troponin T, Serum: <0.01 ng/mL (03-10-23 @ 21:42)  Troponin T, Serum: <0.01 ng/mL (03-10-23 @ 16:57)  Troponin T, Serum: <0.01 ng/mL (03-10-23 @ 11:34)    CARDIAC MARKERS ( 10 Mar 2023 21:42 )  x     / <0.01 ng/mL / x     / x     / x      CARDIAC MARKERS ( 10 Mar 2023 16:57 )  x     / <0.01 ng/mL / x     / x     / x      CARDIAC MARKERS ( 10 Mar 2023 11:34 )  x     / <0.01 ng/mL / x     / x     / x            Troponin trend:      03-10 Chol 143 LDL -- HDL 47 Trig 89  COVID-19 PCR: NotDetec (09 Mar 2023 20:29)      RADIOLOGY:  -CXR:  -TTE:  < from: TTE Echo Complete w/ Contrast w/ Doppler (03.10.23 @ 16:49) >  Summary:   1. Moderately decreased global left ventricular systolic function with   mild concentric left ventricular hypertrophy.. Left ventricular ejection   fraction, by visual estimation, is 35 to 40%.   2. Endocardial visualization was enhancedwith intravenous echo   contrast. There is no evidence of LV thrombus.   3. Diastolic function could not be assessed in this study due to atrial   arrhythmia.   4. Normal right ventricular size with moderately reduced systolic   function.   5. Mildlyenlarged left atrium.   6. No hemodynamically significant valvular disease.   7. Mild pulmonary hypertension (PASP = 42mmHg).   8. No pericardial effusion.   9. NB: patient was in rapid atrial fibrillation during this exam.    < end of copied text >    -CCTA:  -STRESS TEST:  -CATHETERIZATION:  -OTHER:  EC Lead ECG:   Ventricular Rate 97 BPM    Atrial Rate 115 BPM    QRS Duration 130 ms    Q-T Interval 386 ms    QTC Calculation(Bazett) 490 ms    R Axis -30 degrees    T Axis -12 degrees    Diagnosis Line Atrial fibrillation  Left axis deviation  Right bundle branch block  Abnormal ECG    Confirmed by JAMA MIN MD ([id number]) on 3/10/2023 6:51:49 AM (03-10 @ 01:40)      TELEMETRY EVENTS: Afib at 120's

## 2023-03-11 NOTE — PROGRESS NOTE ADULT - SUBJECTIVE AND OBJECTIVE BOX
Patient is a 62y old  Male who presents with a chief complaint of Afib w/ RVR, epistaxis (03-11-23)      Pt seen and examined at bedside. No CP or SOB. No overnight events       PAST MEDICAL & SURGICAL HISTORY:      VITAL SIGNS (Last 24 hrs):  T(C): 36.3 (03-11-23 @ 15:47), Max: 36.8 (03-10-23 @ 23:01)  HR: 75 (03-11-23 @ 15:47) (75 - 129)  BP: 142/87 (03-11-23 @ 15:47) (129/93 - 156/109)  RR: 18 (03-11-23 @ 15:47) (18 - 18)  SpO2: 95% (03-11-23 @ 15:47) (94% - 97%)  Wt(kg): --  Daily     Daily     I&O's Summary      PHYSICAL EXAM:  GENERAL: NAD, well-developed  HEAD:  Atraumatic, Normocephalic  EYES: EOMI, PERRLA, conjunctiva and sclera clear  NECK: Supple, No JVD  CHEST/LUNG: Clear to auscultation bilaterally; No wheeze  HEART: Regular rate and rhythm; No murmurs, rubs, or gallops  ABDOMEN: Soft, Nontender, Nondistended; Bowel sounds present  EXTREMITIES:  2+ Peripheral Pulses, No clubbing, cyanosis, or edema  PSYCH: AAOx3  NEUROLOGY: non-focal  SKIN: No rashes or lesions    Labs Reviewed  Spoke to patient in regards to abnormal labs.    CBC Full  -  ( 11 Mar 2023 07:43 )  WBC Count : 12.16 K/uL  Hemoglobin : 17.6 g/dL  Hematocrit : 51.5 %  Platelet Count - Automated : 190 K/uL  Mean Cell Volume : 103.0 fL  Mean Cell Hemoglobin : 35.2 pg  Mean Cell Hemoglobin Concentration : 34.2 g/dL  Auto Neutrophil # : 8.85 K/uL  Auto Lymphocyte # : 2.06 K/uL  Auto Monocyte # : 1.09 K/uL  Auto Eosinophil # : 0.08 K/uL  Auto Basophil # : 0.05 K/uL  Auto Neutrophil % : 72.8 %  Auto Lymphocyte % : 16.9 %  Auto Monocyte % : 9.0 %  Auto Eosinophil % : 0.7 %  Auto Basophil % : 0.4 %    BMP:    03-11 @ 07:43    Blood Urea Nitrogen - 11  Calcium - 8.9  Carbond Dioxide - 28  Chloride - 98  Creatinine - 1.2  Glucose - 111  Potassium - 4.6  Sodium - 138      Hemoglobin A1c -   PT/INR - ( 10 Mar 2023 08:00 )   PT: 12.40 sec;   INR: 1.08 ratio         PTT - ( 10 Mar 2023 08:00 )  PTT:46.0 sec  Urine Culture:        COVID Labs  CRP:      D-Dimer:  242 ng/mL DDU (03-10-23 @ 11:34)            Imaging reviewed independently and reviewed read    < from: Xray Chest 1 View- PORTABLE-Urgent (03.09.23 @ 14:37) >  Impression:    Mild cardiomegaly. No consolidation, effusion or pneumothorax.    < end of copied text >    < from: TTE Echo Complete w/ Contrast w/ Doppler (03.10.23 @ 16:49) >  Summary:   1. Moderately decreased global left ventricular systolic function with   mild concentric left ventricular hypertrophy.. Left ventricular ejection   fraction, by visual estimation, is 35 to 40%.   2. Endocardial visualization was enhancedwith intravenous echo   contrast. There is no evidence of LV thrombus.   3. Diastolic function could not be assessed in this study due to atrial   arrhythmia.   4. Normal right ventricular size with moderately reduced systolic   function.   5. Mildlyenlarged left atrium.   6. No hemodynamically significant valvular disease.   7. Mild pulmonary hypertension (PASP = 42mmHg).   8. No pericardial effusion.   9. NB: patient was in rapid atrial fibrillation during this exam.    < end of copied text >        MEDICATIONS  (STANDING):  aMIOdarone Infusion 1 mG/Min (33.3 mL/Hr) IV Continuous <Continuous>  aMIOdarone Infusion 0.5 mG/Min (16.7 mL/Hr) IV Continuous <Continuous>  enoxaparin Injectable 110 milliGRAM(s) SubCutaneous every 12 hours  furosemide   Injectable 60 milliGRAM(s) IV Push daily  losartan 50 milliGRAM(s) Oral daily  metoprolol tartrate 25 milliGRAM(s) Oral four times a day  pantoprazole    Tablet 40 milliGRAM(s) Oral before breakfast    MEDICATIONS  (PRN):  acetaminophen     Tablet .. 650 milliGRAM(s) Oral every 6 hours PRN Temp greater or equal to 38C (100.4F), Mild Pain (1 - 3)  melatonin 5 milliGRAM(s) Oral at bedtime PRN Insomnia

## 2023-03-11 NOTE — PROGRESS NOTE ADULT - ASSESSMENT
61 yo M with hx of HTN p/w epistaxis and palpitations with SOB. Admits to VAZQUEZ/LE edema over a year. Found to be in new onset AF RVR  s/p GABINO and unsuccessful DCCV  on Amiodarone    Impression:  New Onset AF RVR  CHADSVASC = 1  HTN    con;'t tele  con't Eliquis without interruption, for reattempt DCCV on Monday  con't Amiodarone loading, once IV drip completes 400mg bid for 2 weeks followed 200mg daily  EKG daily for QTc  Cont Metoprolol 12.5mg Q12h, can increase as BP tolerates  once in NSR, repeat TTE  Monitor electrolytes, maintain WNL  Will follow  NPO after MN on Sun  check COVID on  Sun AM

## 2023-03-11 NOTE — PROGRESS NOTE ADULT - SUBJECTIVE AND OBJECTIVE BOX
INTERVAL HPI/OVERNIGHT EVENTS:  s/p unsuccessful dccv yeterday  started on Amiodarone  HR 90-140s    MEDICATIONS  (STANDING):  aMIOdarone Infusion 1 mG/Min (33.3 mL/Hr) IV Continuous <Continuous>  aMIOdarone Infusion 0.5 mG/Min (16.7 mL/Hr) IV Continuous <Continuous>  enoxaparin Injectable 110 milliGRAM(s) SubCutaneous every 12 hours  furosemide   Injectable 60 milliGRAM(s) IV Push daily  losartan 50 milliGRAM(s) Oral daily  metoprolol tartrate 25 milliGRAM(s) Oral four times a day  pantoprazole    Tablet 40 milliGRAM(s) Oral before breakfast    MEDICATIONS  (PRN):  acetaminophen     Tablet .. 650 milliGRAM(s) Oral every 6 hours PRN Temp greater or equal to 38C (100.4F), Mild Pain (1 - 3)  melatonin 5 milliGRAM(s) Oral at bedtime PRN Insomnia      Allergies    No Known Allergies    Intolerances        REVIEW OF SYSTEMS    [x ] A ten-point review of systems was otherwise negative except as noted.  [ ] Due to altered mental status/intubation, subjective information were not able to be obtained from the patient. History was obtained, to the extent possible, from review of the chart and collateral sources of information.      Vital Signs Last 24 Hrs  T(C): 36.2 (11 Mar 2023 07:39), Max: 36.8 (10 Mar 2023 23:01)  T(F): 97.2 (11 Mar 2023 07:39), Max: 98.2 (10 Mar 2023 23:01)  HR: 125 (11 Mar 2023 07:39) (105 - 129)  BP: 148/95 (11 Mar 2023 07:39) (129/93 - 156/109)  BP(mean): --  RR: 18 (11 Mar 2023 07:39) (18 - 18)  SpO2: 96% (11 Mar 2023 07:39) (94% - 97%)    Parameters below as of 11 Mar 2023 07:39  Patient On (Oxygen Delivery Method): room air          PHYSICAL EXAM:    GENERAL: In no apparent distress, well nourished, and hydrated.  HEART: Regular rate and rhythm; No murmur; NO rubs, or gallops.  PULMONARY: Clear to auscultation and percussion.  Normal expansion/effort. No rales, wheezing, or rhonchi bilaterally.  ABDOMEN: Soft, Nontender, Nondistended; Bowel sounds present  EXTREMITIES:  Extremities warm, pink, well-perfused, 2+ Peripheral Pulses, No clubbing, cyanosis, or edema  NEUROLOGICAL: alert & oriented x 3, no focal deficits, PERRLA, EOMI    LABS:                        17.6   12.16 )-----------( 190      ( 11 Mar 2023 07:43 )             51.5     03-11    138  |  98  |  11  ----------------------------<  111<H>  4.6   |  28  |  1.2    Ca    8.9      11 Mar 2023 07:43  Phos  4.1     03-11  Mg     1.9     03-11    TPro  7.2  /  Alb  4.0  /  TBili  1.4<H>  /  DBili  x   /  AST  42<H>  /  ALT  52<H>  /  AlkPhos  75  03-11    PT/INR - ( 10 Mar 2023 08:00 )   PT: 12.40 sec;   INR: 1.08 ratio         PTT - ( 10 Mar 2023 08:00 )  PTT:46.0 sec    COVID-19 PCR: NotDetec (03-09-23 @ 20:29)      12 Lead ECG:   Ventricular Rate 97 BPM    Atrial Rate 115 BPM    QRS Duration 130 ms    Q-T Interval 386 ms    QTC Calculation(Bazett) 490 ms    R Axis -30 degrees    T Axis -12 degrees    Diagnosis Line Atrial fibrillation  Left axis deviation  Right bundle branch block  Abnormal ECG    Confirmed by JAMA MIN MD ([id number]) on 3/10/2023 6:51:49 AM (03-10-23 @ 01:40)  12 Lead ECG:   Ventricular Rate 162 BPM    Atrial Rate 156 BPM    QRS Duration 122 ms    Q-T Interval 310 ms    QTC Calculation(Bazett) 508 ms    R Axis -44 degrees    T Axis 58 degrees    Diagnosis Line Atrial fibrillation with rapid ventricular response  Left axis deviation  Right bundle branch block  Abnormal ECG    Confirmed by Mickey Falk ([id number]) on 3/9/2023 4:32:45 PM (03-09-23 @ 13:09)      RADIOLOGY & ADDITIONAL TESTS:

## 2023-03-11 NOTE — CONSULT NOTE ADULT - ATTENDING COMMENTS
62M with new diagnosis of Afib with RVR as well as HFrEF. Per my interview, patient does not have any palpitations and was unaware of an arrhythmia. He presented to the ED for epistaxis and this was an incidental diagnosis.     Given the patient has asymptomatic Afib, the duration of his arrhythmia is unknown. GABINO on 3/10/23 with no DEANN thrombus, and cardioversion was unsuccessful. Patient was started on amiodarone IV, with plan for cardioversion on Monday.     Patient's new diagnosis of HFrEF may be tachycardia-induced CM, ischemic CM, or other. Given he will need to be on anticoagulation for at least 30 days continuously after his DCCV and considering he does not have symptoms of ischemia, ischemic work-up will be delayed to the outpatient setting.     Recommendations:   - Amiodarone and DCCV as per EP team, see note  - Metoprolol should be discontinued after midnight on 3/13 so he does not receive a dose of metoprolol before reattempt at cardioversion  - No need for enoxaparin, would anticoagulate with Eliquis 5 mg BID  - On Lasix 60 mg IV BID with goal net negative 1-2 L daily. If not achieving this goal, would increase diuretic to Lasix 80 mg IV BID. If further diuresis is necessary, can add metolazone 5 mg daily.  - Diurese until there are no crackles on exam and/or Cr increases  - Increase losartan to 100 mg daaily  - Start Farxiga 10 mg daily  - Will resume metoprolol after the DCCV based on HR and BP in sinus rhythm  - After cardioversion, please obtain ECG in sinus rhtyhm  - Patient will require outpatient sleep study, as he likely has SANJU based on my assessment when he was sleeping  - TSH normal  - Patient educated on Afib and HFrEF, and given UpToDate handoffs on each topic

## 2023-03-12 LAB
ANION GAP SERPL CALC-SCNC: 13 MMOL/L — SIGNIFICANT CHANGE UP (ref 7–14)
BASOPHILS # BLD AUTO: 0.04 K/UL — SIGNIFICANT CHANGE UP (ref 0–0.2)
BASOPHILS NFR BLD AUTO: 0.4 % — SIGNIFICANT CHANGE UP (ref 0–1)
BUN SERPL-MCNC: 23 MG/DL — HIGH (ref 10–20)
CALCIUM SERPL-MCNC: 8.7 MG/DL — SIGNIFICANT CHANGE UP (ref 8.4–10.5)
CHLORIDE SERPL-SCNC: 97 MMOL/L — LOW (ref 98–110)
CO2 SERPL-SCNC: 26 MMOL/L — SIGNIFICANT CHANGE UP (ref 17–32)
CREAT SERPL-MCNC: 1.4 MG/DL — SIGNIFICANT CHANGE UP (ref 0.7–1.5)
EGFR: 57 ML/MIN/1.73M2 — LOW
EOSINOPHIL # BLD AUTO: 0.12 K/UL — SIGNIFICANT CHANGE UP (ref 0–0.7)
EOSINOPHIL NFR BLD AUTO: 1.1 % — SIGNIFICANT CHANGE UP (ref 0–8)
GLUCOSE SERPL-MCNC: 103 MG/DL — HIGH (ref 70–99)
HCT VFR BLD CALC: 51.5 % — SIGNIFICANT CHANGE UP (ref 42–52)
HCT VFR BLD CALC: 52.3 % — HIGH (ref 42–52)
HGB BLD-MCNC: 17.4 G/DL — SIGNIFICANT CHANGE UP (ref 14–18)
HGB BLD-MCNC: 17.7 G/DL — SIGNIFICANT CHANGE UP (ref 14–18)
IMM GRANULOCYTES NFR BLD AUTO: 0.7 % — HIGH (ref 0.1–0.3)
LYMPHOCYTES # BLD AUTO: 1.49 K/UL — SIGNIFICANT CHANGE UP (ref 1.2–3.4)
LYMPHOCYTES # BLD AUTO: 14 % — LOW (ref 20.5–51.1)
MAGNESIUM SERPL-MCNC: 2 MG/DL — SIGNIFICANT CHANGE UP (ref 1.8–2.4)
MCHC RBC-ENTMCNC: 33.8 G/DL — SIGNIFICANT CHANGE UP (ref 32–37)
MCHC RBC-ENTMCNC: 33.8 G/DL — SIGNIFICANT CHANGE UP (ref 32–37)
MCHC RBC-ENTMCNC: 34.3 PG — HIGH (ref 27–31)
MCHC RBC-ENTMCNC: 34.6 PG — HIGH (ref 27–31)
MCV RBC AUTO: 101.4 FL — HIGH (ref 80–94)
MCV RBC AUTO: 102.4 FL — HIGH (ref 80–94)
MONOCYTES # BLD AUTO: 1.33 K/UL — HIGH (ref 0.1–0.6)
MONOCYTES NFR BLD AUTO: 12.5 % — HIGH (ref 1.7–9.3)
NEUTROPHILS # BLD AUTO: 7.6 K/UL — HIGH (ref 1.4–6.5)
NEUTROPHILS NFR BLD AUTO: 71.3 % — SIGNIFICANT CHANGE UP (ref 42.2–75.2)
NRBC # BLD: 0 /100 WBCS — SIGNIFICANT CHANGE UP (ref 0–0)
NRBC # BLD: 0 /100 WBCS — SIGNIFICANT CHANGE UP (ref 0–0)
PLATELET # BLD AUTO: 197 K/UL — SIGNIFICANT CHANGE UP (ref 130–400)
PLATELET # BLD AUTO: 213 K/UL — SIGNIFICANT CHANGE UP (ref 130–400)
POTASSIUM SERPL-MCNC: 3.6 MMOL/L — SIGNIFICANT CHANGE UP (ref 3.5–5)
POTASSIUM SERPL-SCNC: 3.6 MMOL/L — SIGNIFICANT CHANGE UP (ref 3.5–5)
RBC # BLD: 5.03 M/UL — SIGNIFICANT CHANGE UP (ref 4.7–6.1)
RBC # BLD: 5.16 M/UL — SIGNIFICANT CHANGE UP (ref 4.7–6.1)
RBC # FLD: 14.4 % — SIGNIFICANT CHANGE UP (ref 11.5–14.5)
RBC # FLD: 14.4 % — SIGNIFICANT CHANGE UP (ref 11.5–14.5)
SARS-COV-2 RNA SPEC QL NAA+PROBE: SIGNIFICANT CHANGE UP
SODIUM SERPL-SCNC: 136 MMOL/L — SIGNIFICANT CHANGE UP (ref 135–146)
WBC # BLD: 10.18 K/UL — SIGNIFICANT CHANGE UP (ref 4.8–10.8)
WBC # BLD: 10.65 K/UL — SIGNIFICANT CHANGE UP (ref 4.8–10.8)
WBC # FLD AUTO: 10.18 K/UL — SIGNIFICANT CHANGE UP (ref 4.8–10.8)
WBC # FLD AUTO: 10.65 K/UL — SIGNIFICANT CHANGE UP (ref 4.8–10.8)

## 2023-03-12 PROCEDURE — 99233 SBSQ HOSP IP/OBS HIGH 50: CPT

## 2023-03-12 PROCEDURE — 99223 1ST HOSP IP/OBS HIGH 75: CPT

## 2023-03-12 PROCEDURE — 74176 CT ABD & PELVIS W/O CONTRAST: CPT | Mod: 26

## 2023-03-12 PROCEDURE — 93010 ELECTROCARDIOGRAM REPORT: CPT

## 2023-03-12 RX ORDER — AMIODARONE HYDROCHLORIDE 400 MG/1
200 TABLET ORAL DAILY
Refills: 0 | Status: DISCONTINUED | OUTPATIENT
Start: 2023-03-13 | End: 2023-03-14

## 2023-03-12 RX ORDER — POTASSIUM CHLORIDE 20 MEQ
20 PACKET (EA) ORAL ONCE
Refills: 0 | Status: COMPLETED | OUTPATIENT
Start: 2023-03-12 | End: 2023-03-12

## 2023-03-12 RX ORDER — SODIUM CHLORIDE 9 MG/ML
500 INJECTION INTRAMUSCULAR; INTRAVENOUS; SUBCUTANEOUS
Refills: 0 | Status: DISCONTINUED | OUTPATIENT
Start: 2023-03-12 | End: 2023-03-12

## 2023-03-12 RX ORDER — INFLUENZA VIRUS VACCINE 15; 15; 15; 15 UG/.5ML; UG/.5ML; UG/.5ML; UG/.5ML
0.5 SUSPENSION INTRAMUSCULAR ONCE
Refills: 0 | Status: DISCONTINUED | OUTPATIENT
Start: 2023-03-12 | End: 2023-03-16

## 2023-03-12 RX ORDER — TAMSULOSIN HYDROCHLORIDE 0.4 MG/1
0.4 CAPSULE ORAL AT BEDTIME
Refills: 0 | Status: DISCONTINUED | OUTPATIENT
Start: 2023-03-12 | End: 2023-03-16

## 2023-03-12 RX ORDER — SODIUM CHLORIDE 0.65 %
1 AEROSOL, SPRAY (ML) NASAL
Refills: 0 | Status: DISCONTINUED | OUTPATIENT
Start: 2023-03-12 | End: 2023-03-16

## 2023-03-12 RX ADMIN — Medication 25 MILLIGRAM(S): at 06:37

## 2023-03-12 RX ADMIN — PANTOPRAZOLE SODIUM 40 MILLIGRAM(S): 20 TABLET, DELAYED RELEASE ORAL at 06:37

## 2023-03-12 RX ADMIN — Medication 25 MILLIGRAM(S): at 17:52

## 2023-03-12 RX ADMIN — Medication 60 MILLIGRAM(S): at 13:15

## 2023-03-12 RX ADMIN — AMIODARONE HYDROCHLORIDE 400 MILLIGRAM(S): 400 TABLET ORAL at 06:37

## 2023-03-12 RX ADMIN — Medication 60 MILLIGRAM(S): at 06:36

## 2023-03-12 RX ADMIN — LOSARTAN POTASSIUM 100 MILLIGRAM(S): 100 TABLET, FILM COATED ORAL at 06:37

## 2023-03-12 RX ADMIN — TAMSULOSIN HYDROCHLORIDE 0.4 MILLIGRAM(S): 0.4 CAPSULE ORAL at 21:43

## 2023-03-12 RX ADMIN — Medication 20 MILLIEQUIVALENT(S): at 17:52

## 2023-03-12 RX ADMIN — Medication 25 MILLIGRAM(S): at 13:14

## 2023-03-12 RX ADMIN — Medication 1 SPRAY(S): at 17:53

## 2023-03-12 RX ADMIN — APIXABAN 5 MILLIGRAM(S): 2.5 TABLET, FILM COATED ORAL at 21:43

## 2023-03-12 RX ADMIN — Medication 1 SPRAY(S): at 06:36

## 2023-03-12 NOTE — PROGRESS NOTE ADULT - SUBJECTIVE AND OBJECTIVE BOX
Patient is a 62y old  Male who presents with a chief complaint of Afib w/ RVR, epistaxis (03-12-23)      Pt seen and examined at bedside. No CP or SOB. Tele NSR        PAST MEDICAL & SURGICAL HISTORY:    VITAL SIGNS (Last 24 hrs):  T(C): 36.7 (03-12-23 @ 12:56), Max: 36.8 (03-12-23 @ 01:06)  HR: 72 (03-12-23 @ 12:56) (70 - 84)  BP: 127/69 (03-12-23 @ 12:56) (127/69 - 145/85)  RR: 18 (03-12-23 @ 12:56) (18 - 18)  SpO2: 96% (03-12-23 @ 03:46) (95% - 96%)  Wt(kg): --  Daily Height in cm: 175.26 (12 Mar 2023 03:46)    Daily     I&O's Summary    12 Mar 2023 07:01  -  12 Mar 2023 15:11  --------------------------------------------------------  IN: 720 mL / OUT: 0 mL / NET: 720 mL      PHYSICAL EXAM:  GENERAL: NAD, well-developed  HEAD:  Atraumatic, Normocephalic  EYES: EOMI, PERRLA, conjunctiva and sclera clear  NECK: Supple, No JVD  CHEST/LUNG: Clear to auscultation bilaterally; No wheeze  HEART: Regular rate and rhythm; No murmurs, rubs, or gallops  ABDOMEN: Soft, Nontender, Nondistended; Bowel sounds present  EXTREMITIES:  2+ Peripheral Pulses, No clubbing, cyanosis, or edema  PSYCH: AAOx3  NEUROLOGY: non-focal  SKIN: No rashes or lesions    < from: US Abdomen Upper Quadrant Right (03.11.23 @ 21:22) >  IMPRESSION:    No sonographic evidence of cholecystitis.    Moderate right hydronephrosis.    < end of copied text >        Labs Reviewed  Spoke to patient in regards to abnormal labs.    CBC Full  -  ( 12 Mar 2023 12:03 )  WBC Count : 10.18 K/uL  Hemoglobin : 17.7 g/dL  Hematocrit : 52.3 %  Platelet Count - Automated : 197 K/uL  Mean Cell Volume : 101.4 fL  Mean Cell Hemoglobin : 34.3 pg  Mean Cell Hemoglobin Concentration : 33.8 g/dL  Auto Neutrophil # : x  Auto Lymphocyte # : x  Auto Monocyte # : x  Auto Eosinophil # : x  Auto Basophil # : x  Auto Neutrophil % : x  Auto Lymphocyte % : x  Auto Monocyte % : x  Auto Eosinophil % : x  Auto Basophil % : x    BMP:    03-12 @ 12:03    Blood Urea Nitrogen - 23  Calcium - 8.7  Carbond Dioxide - 26  Chloride - 97  Creatinine - 1.4  Glucose - 103  Potassium - 3.6  Sodium - 136      Hemoglobin A1c -   PT/INR - ( 10 Mar 2023 08:00 )   PT: 12.40 sec;   INR: 1.08 ratio         PTT - ( 10 Mar 2023 08:00 )  PTT:46.0 sec  Urine Culture:        COVID Labs  CRP:      D-Dimer:  242 ng/mL DDU (03-10-23 @ 11:34)      Imaging reviewed independently and reviewed read    < from: US Abdomen Upper Quadrant Right (03.11.23 @ 21:22) >  IMPRESSION:    No sonographic evidence of cholecystitis.    Moderate right hydronephrosis.    < end of copied text >      MEDICATIONS  (STANDING):  aMIOdarone    Tablet 400 milliGRAM(s) Oral every 12 hours  apixaban 5 milliGRAM(s) Oral every 12 hours  dapagliflozin 10 milliGRAM(s) Oral every 24 hours  influenza   Vaccine 0.5 milliLiter(s) IntraMuscular once  metoprolol tartrate 25 milliGRAM(s) Oral four times a day  pantoprazole    Tablet 40 milliGRAM(s) Oral before breakfast  sodium chloride 0.65% Nasal 1 Spray(s) Both Nostrils two times a day  tamsulosin 0.4 milliGRAM(s) Oral at bedtime    MEDICATIONS  (PRN):  acetaminophen     Tablet .. 650 milliGRAM(s) Oral every 6 hours PRN Temp greater or equal to 38C (100.4F), Mild Pain (1 - 3)  melatonin 5 milliGRAM(s) Oral at bedtime PRN Insomnia

## 2023-03-12 NOTE — PROGRESS NOTE ADULT - ASSESSMENT
62M with PMHx of HTN presents to the ED for epistaxis and palpitations x1 day. Pt reports that he had a nosebleed yesterday that has not stopped since then, so he presented for evaluation. He also complains of palpitations and an "anxious feeling" intermittent over the past few months. On further questioning pt reports that he hasn't seen a PCP in over a year, and during that period he has been having orthopnea, exertional dyspnea, and occasional LE swelling.     #New-onset  Paroxysmal Afib w/ RVR  #acute HFrEF, possible 2/2 ischemic event and in Afib with RVR   - , /97, months of intermittent palpitations, orthopnea, LE swelling, exertional dyspnea  - EKG Afib w/ RVR and RBBB  - CXR mild cardiomegaly but no consolidations/effusions  - s/p cardizem IV and PO   - Admit to telemetry  - CHADsVASC 2-3 (HTN, CHF)   - a1c 5.7  - Mild transaminitis likely 2/2 hepatic congestion  - F/u TTE, TSH  - hold  lasix, pt starting to have DYLAN  - Strict I&Os, daily weights  - EP did cardioversion today, unsuccessful, reccs followed by team, continue amio ggt as per EP, dw EP   - cardiology consult for possible ischemic work up  - continue  lovenox 110 mg BID, monitor for bleeding, incase pt needs cath , eliquis on DC   - Discussed benefits and risks of starting anticoagulation to prevent strokes from Afib/Aflutter including risk of excessively bleeding gums or nose bleeds, hematuria,  hemorrhagic stroke, GI bleed,  excessive bleeding after trauma or cuts and even death. Advised seek medical intervention immediately. Pt decided to start anticoagulation given benefits outweighs risk.  - EtOH use (2-3 beers/day) and suspected SANJU,  outpatient sleep study  - dimer neg   - hgb stable   - trop neg x3   - follow up cardiology appreciated  - EP appreciated, no cardioversion as pt NSR      #HTN  #HTN Urgency due to medication non-compliance  #Epistaxis  - Presented with /97 and mild epistaxis   - BP improved to 135/91 in ED  - s/p TXA for epistaxis  - Pt needs close outpatient PCP f/u  - hold lasix and losartan   - metoprolol tartrate 25 milliGRAM(s) q6hrs     #leukocytosis- possible reactive- no antibiotics, follow up, if febrile do infectious work up     #new right renal hydro?- dw urology recc CT abd and pelvic non con     #preDM- 5.7- diet and exercise     #transaminitis- RUQ, Follow up LFTs, possible congestion     #macrocytosis- check b12 and folate, pt has drinking hx     #Progress Note Handoff  Pending (specify):  follow cardiology, ep, cardiac telemonitoring, labs  Family discussion: house staff updated pt family  Disposition: Pt will need primary care physician appointment at [facility], home    Decision to admit the pt is based on acuity as above   High risk given above acuity and comorbidities, labs reviewed, dw EP, and urology

## 2023-03-12 NOTE — CONSULT NOTE ADULT - NS ATTEND AMEND GEN_ALL_CORE FT
seen on March 12th  patient with right hydro -- CT reviewed -- patient has right UPJ obstruction  patient recalls that as a child he was told that he had a  abnormality  does remember as a younger man having flank pain when he had high fluid intake  given that he has no flank pain no immediate intervention  Cr was slowly increasing -- some renal failure   team to keep a close eye  if flank pain develops or renal function worsens can resonsult  otherwise can follow up as outpatient.
# AF with RVR, early return to AF after cardioversion  # Cardiomyopathy  # Epistaxis, cleared by ENT for OAC    Rec  - Amio load and try GABINO/CV next week. Baseline TSH and LFTs noted.   - Eliquis 5mg PO BID   - Cards consult for ischemic eval   - Monitor lytes and keep K>4 and Mg>2

## 2023-03-12 NOTE — CONSULT NOTE ADULT - SUBJECTIVE AND OBJECTIVE BOX
UROLOGY CONSULT NOTE:  Pt is a 62y old Male with a pmh of HTN admitted for afib w/ RVR and epistaxis -  now found to have moderate right hydronephrosis on Abdominal US.  consulted for further evaluation. Patient seen and examined with his girlfriend at bedside. Patient currently without any complaints whatsoever at this time and states that he was to be discharged today as his afib and epistaxis had been well managed/treated. Patient states that he has no hx of  issues and has never seen a . Per chart review, it was noted that patient's Cr is slowly increasing (though still WNL) while his GFR is slowly decreasing in the setting of moderate right hydronephrosis. Patient at this time is not experiencing any flank pain, groin pain, back pain or LUTS. No fevers chills n/v, SOB/diff breathing       MEDICATIONS  (STANDING):  apixaban 5 milliGRAM(s) Oral every 12 hours  dapagliflozin 10 milliGRAM(s) Oral every 24 hours  influenza   Vaccine 0.5 milliLiter(s) IntraMuscular once  metoprolol tartrate 25 milliGRAM(s) Oral four times a day  pantoprazole    Tablet 40 milliGRAM(s) Oral before breakfast  potassium chloride   Powder 20 milliEquivalent(s) Oral once  sodium chloride 0.65% Nasal 1 Spray(s) Both Nostrils two times a day  tamsulosin 0.4 milliGRAM(s) Oral at bedtime    MEDICATIONS  (PRN):  acetaminophen     Tablet .. 650 milliGRAM(s) Oral every 6 hours PRN Temp greater or equal to 38C (100.4F), Mild Pain (1 - 3)  melatonin 5 milliGRAM(s) Oral at bedtime PRN Insomnia      Allergies:   No Known Allergies    SOCIAL HISTORY: No illicit drug use    REVIEW OF SYSTEMS   [x] A ten-point review of systems was otherwise negative except as noted.      Vital Signs Last 24 Hrs  T(C): 36.7 (12 Mar 2023 12:56), Max: 36.8 (12 Mar 2023 01:06)  T(F): 98 (12 Mar 2023 12:56), Max: 98.3 (12 Mar 2023 01:06)  HR: 72 (12 Mar 2023 12:56) (70 - 84)  BP: 127/69 (12 Mar 2023 12:56) (127/69 - 145/85)  BP(mean): 104 (12 Mar 2023 06:30) (92 - 104)  RR: 18 (12 Mar 2023 12:56) (18 - 18)  SpO2: 96% (12 Mar 2023 03:46) (95% - 96%)    Parameters below as of 12 Mar 2023 03:46  Patient On (Oxygen Delivery Method): room air      PHYSICAL EXAM:  GEN: NAD, awake and alert.  RESP: Non-labored breathing. No use of accessory muscles.  CARDIO: +S1/S2  ABDO: Soft, NT/ND,  no suprapubic tenderness  BACK: No CVAT B/L  : Freely voiding clear yellow urine without issues  EXT: LUKE x 4  SKIN: Good color, non diaphoretic.      I&O's Summary  12 Mar 2023 07:01  -  12 Mar 2023 16:43  --------------------------------------------------------  IN: 720 mL / OUT: 0 mL / NET: 720 mL        LABS:                      17.7   10.18 )-----------( 197      ( 12 Mar 2023 12:03 )             52.3     03-12    136  |  97<L>  |  23<H>  ----------------------------<  103<H>  3.6   |  26  |  1.4    Ca    8.7      12 Mar 2023 12:03  Phos  4.1     03-11  Mg     2.0     03-12    TPro  7.2  /  Alb  4.0  /  TBili  1.4<H>  /  DBili  x   /  AST  42<H>  /  ALT  52<H>  /  AlkPhos  75  03-11      RADIOLOGY & ADDITIONAL STUDIES:    ACC: [id number] EXAM:  US ABDOMEN RT UPR QUADRANT   ORDERED BY: DAISHA HYDE     PROCEDURE DATE:  03/11/2023          INTERPRETATION:  CLINICAL HISTORY / REASON FOR EXAM: Bilirubinemia and   transaminitis.    COMPARISON: None    PROCEDURE: Ultrasound of the right upper quadrant was performed.    FINDINGS:    LIVER: Increased echogenicity. Patent portal confluence.  BILE DUCTS: Normal caliber. Common bile duct measures 5 mm.  GALLBLADDER: Contracted gallbladder. No visualized stones. No   pericholecystic fluid. Negative sonographic Parra's sign.  PANCREAS: Visualized portions are within normal limits.  RIGHT KIDNEY: 13.2 cm. Moderate hydronephrosis.  ASCITES: None.  IVC: Visualized portions are within normal limits.      IMPRESSION:  No sonographic evidence of cholecystitis.  Moderate right hydronephrosis.      --- End of Report ---  IDALIA MULLIGAN MD; Attending Radiologist  This document has been electronically signed. Mar 12 2023  8:56AM   UROLOGY CONSULT NOTE:  Pt is a 62y old Male with a pmh of HTN admitted for afib w/ RVR and epistaxis -  now found to have moderate right hydronephrosis on Abdominal US.  consulted for further evaluation. Patient seen and examined with his girlfriend at bedside. Patient currently without any complaints whatsoever at this time and states that he was to be discharged today as his afib and epistaxis had been well managed/treated. Patient states that he has no hx of  issues and has never seen a . Per chart review, it was noted that patient's Cr is slowly increasing (though still WNL) while his GFR is slowly decreasing in the setting of moderate right hydronephrosis. Patient at this time is not experiencing any flank pain, groin pain, back pain or LUTS. No fevers chills n/v, SOB/diff breathing       MEDICATIONS  (STANDING):  apixaban 5 milliGRAM(s) Oral every 12 hours  dapagliflozin 10 milliGRAM(s) Oral every 24 hours  influenza   Vaccine 0.5 milliLiter(s) IntraMuscular once  metoprolol tartrate 25 milliGRAM(s) Oral four times a day  pantoprazole    Tablet 40 milliGRAM(s) Oral before breakfast  potassium chloride   Powder 20 milliEquivalent(s) Oral once  sodium chloride 0.65% Nasal 1 Spray(s) Both Nostrils two times a day  tamsulosin 0.4 milliGRAM(s) Oral at bedtime    MEDICATIONS  (PRN):  acetaminophen     Tablet .. 650 milliGRAM(s) Oral every 6 hours PRN Temp greater or equal to 38C (100.4F), Mild Pain (1 - 3)  melatonin 5 milliGRAM(s) Oral at bedtime PRN Insomnia      Allergies:   No Known Allergies    SOCIAL HISTORY: No illicit drug use    REVIEW OF SYSTEMS   [x] A ten-point review of systems was otherwise negative except as noted.      Vital Signs Last 24 Hrs  T(C): 36.7 (12 Mar 2023 12:56), Max: 36.8 (12 Mar 2023 01:06)  T(F): 98 (12 Mar 2023 12:56), Max: 98.3 (12 Mar 2023 01:06)  HR: 72 (12 Mar 2023 12:56) (70 - 84)  BP: 127/69 (12 Mar 2023 12:56) (127/69 - 145/85)  BP(mean): 104 (12 Mar 2023 06:30) (92 - 104)  RR: 18 (12 Mar 2023 12:56) (18 - 18)  SpO2: 96% (12 Mar 2023 03:46) (95% - 96%)    Parameters below as of 12 Mar 2023 03:46  Patient On (Oxygen Delivery Method): room air      PHYSICAL EXAM:  GEN: NAD, awake and alert.  RESP: Non-labored breathing. No use of accessory muscles.  CARDIO: +S1/S2  ABDO: Soft, NT/ND,  no suprapubic tenderness  BACK: No CVAT B/L  : Freely voiding clear yellow urine without issues  EXT: LUKE x 4  SKIN: Good color, non diaphoretic.      I&O's Summary  12 Mar 2023 07:01  -  12 Mar 2023 16:43  --------------------------------------------------------  IN: 720 mL / OUT: 0 mL / NET: 720 mL        LABS:                      17.7   10.18 )-----------( 197      ( 12 Mar 2023 12:03 )             52.3     03-12    136  |  97<L>  |  23<H>  ----------------------------<  103<H>  3.6   |  26  |  1.4    Ca    8.7      12 Mar 2023 12:03  Phos  4.1     03-11  Mg     2.0     03-12    TPro  7.2  /  Alb  4.0  /  TBili  1.4<H>  /  DBili  x   /  AST  42<H>  /  ALT  52<H>  /  AlkPhos  75  03-11      RADIOLOGY & ADDITIONAL STUDIES:    ACC: [id number] EXAM:  US ABDOMEN RT UPR QUADRANT   ORDERED BY: DAISHA HYDE     PROCEDURE DATE:  03/11/2023          INTERPRETATION:  CLINICAL HISTORY / REASON FOR EXAM: Bilirubinemia and   transaminitis.    COMPARISON: None    PROCEDURE: Ultrasound of the right upper quadrant was performed.    FINDINGS:    LIVER: Increased echogenicity. Patent portal confluence.  BILE DUCTS: Normal caliber. Common bile duct measures 5 mm.  GALLBLADDER: Contracted gallbladder. No visualized stones. No   pericholecystic fluid. Negative sonographic Parra's sign.  PANCREAS: Visualized portions are within normal limits.  RIGHT KIDNEY: 13.2 cm. Moderate hydronephrosis.  ASCITES: None.  IVC: Visualized portions are within normal limits.      IMPRESSION:  No sonographic evidence of cholecystitis.  Moderate right hydronephrosis.      --- End of Report ---  IDALIA MULLIGAN MD; Attending Radiologist  This document has been electronically signed. Mar 12 2023  8:56AM

## 2023-03-12 NOTE — PROGRESS NOTE ADULT - ASSESSMENT
63 yo M with hx of HTN p/w epistaxis and palpitations with SOB. Admits to VAZQUEZ/LE edema over a year. Found to be in new onset AF RVR  s/p GABINO and unsuccessful DCCV  on Amiodarone  converted to NSR    Impression:  New Onset AF RVR now in NSR  CHADSVASC = 1  HTN      con't Eliquis without interruption,   con't Amiodarone 200mg daily  Cont Metoprolol 12.5mg Q12h, can increase as BP tolerates  repeat TTE to re-evaluate EF  Monitor electrolytes, maintain WNL  can discharged home from EP stand point  follow up with Dr Monaco in 3-4 weeks

## 2023-03-12 NOTE — PROGRESS NOTE ADULT - SUBJECTIVE AND OBJECTIVE BOX
INTERVAL HPI/OVERNIGHT EVENTS:  converted to NSR overnight  completed Amio drip on PO  QTc slightly proloned from baseline    MEDICATIONS  (STANDING):  aMIOdarone    Tablet 400 milliGRAM(s) Oral every 12 hours  apixaban 5 milliGRAM(s) Oral every 12 hours  dapagliflozin 10 milliGRAM(s) Oral every 24 hours  furosemide   Injectable 60 milliGRAM(s) IV Push two times a day  influenza   Vaccine 0.5 milliLiter(s) IntraMuscular once  losartan 100 milliGRAM(s) Oral daily  metoprolol tartrate 25 milliGRAM(s) Oral four times a day  pantoprazole    Tablet 40 milliGRAM(s) Oral before breakfast  sodium chloride 0.65% Nasal 1 Spray(s) Both Nostrils two times a day  tamsulosin 0.4 milliGRAM(s) Oral at bedtime    MEDICATIONS  (PRN):  acetaminophen     Tablet .. 650 milliGRAM(s) Oral every 6 hours PRN Temp greater or equal to 38C (100.4F), Mild Pain (1 - 3)  melatonin 5 milliGRAM(s) Oral at bedtime PRN Insomnia      Allergies    No Known Allergies    Intolerances        REVIEW OF SYSTEMS    [X ] A ten-point review of systems was otherwise negative except as noted.  [ ] Due to altered mental status/intubation, subjective information were not able to be obtained from the patient. History was obtained, to the extent possible, from review of the chart and collateral sources of information.      Vital Signs Last 24 Hrs  T(C): 36.7 (12 Mar 2023 12:56), Max: 36.8 (12 Mar 2023 01:06)  T(F): 98 (12 Mar 2023 12:56), Max: 98.3 (12 Mar 2023 01:06)  HR: 72 (12 Mar 2023 12:56) (70 - 84)  BP: 127/69 (12 Mar 2023 12:56) (127/69 - 145/85)  BP(mean): 104 (12 Mar 2023 06:30) (92 - 104)  RR: 18 (12 Mar 2023 12:56) (18 - 18)  SpO2: 96% (12 Mar 2023 03:46) (95% - 96%)    Parameters below as of 12 Mar 2023 03:46  Patient On (Oxygen Delivery Method): room air        03-12-23 @ 07:01  -  03-12-23 @ 14:15  --------------------------------------------------------  IN: 720 mL / OUT: 0 mL / NET: 720 mL        PHYSICAL EXAM:    GENERAL: In no apparent distress, well nourished, and hydrated.  HEART: Regular rate and rhythm; No murmur; NO rubs, or gallops.  PULMONARY: Clear to auscultation and percussion.  Normal expansion/effort. No rales, wheezing, or rhonchi bilaterally.  ABDOMEN: Soft, Nontender, Nondistended; Bowel sounds present  EXTREMITIES:  Extremities warm, pink, well-perfused, 2+ Peripheral Pulses, No clubbing, cyanosis, or edema  NEUROLOGICAL: alert & oriented x 3, no focal deficits, PERRLA, EOMI    LABS:                        17.7   10.18 )-----------( 197      ( 12 Mar 2023 12:03 )             52.3     03-12    136  |  97<L>  |  23<H>  ----------------------------<  103<H>  3.6   |  26  |  1.4    Ca    8.7      12 Mar 2023 12:03  Phos  4.1     03-11  Mg     2.0     03-12    TPro  7.2  /  Alb  4.0  /  TBili  1.4<H>  /  DBili  x   /  AST  42<H>  /  ALT  52<H>  /  AlkPhos  75  03-11        COVID-19 PCR: NotDetec (03-12-23 @ 09:23)  COVID-19 PCR: NotDetec (03-09-23 @ 20:29)      12 Lead ECG:   Ventricular Rate 129 BPM    Atrial Rate 125 BPM    QRS Duration 132 ms    Q-T Interval 362 ms    QTC Calculation(Bazett) 530 ms    R Axis -41 degrees    T Axis 9 degrees    Diagnosis Line Atrial fibrillation with rapid ventricular response  Left axis deviation  Right bundle branch block  Abnormal ECG    Confirmed by Joshua Luis ([id number]) on 3/11/2023 5:31:06 PM (03-11-23 @ 10:04)  12 Lead ECG:   Ventricular Rate 128 BPM    Atrial Rate 256 BPM    QRS Duration 128 ms    Q-T Interval 324 ms    QTC Calculation(Bazett) 473 ms    P Axis 236 degrees    R Axis -63 degrees    T Axis 26 degrees    Diagnosis Line Atrial flutter with 2:1 A-V conduction  Left axis deviation  Right bundle branch block  Anterior infarct , age undetermined  Abnormal ECG    Confirmed by Joshua Luis ([id number]) on 3/11/2023 5:46:26 PM (03-11-23 @ 02:44)  12 Lead ECG:   Ventricular Rate 97 BPM    Atrial Rate 115 BPM    QRS Duration 130 ms    Q-T Interval 386 ms    QTC Calculation(Bazett) 490 ms    R Axis -30 degrees    T Axis -12 degrees    Diagnosis Line Atrial fibrillation  Left axis deviation  Right bundle branch block  Abnormal ECG    Confirmed by JAAM MIN MD ([id number]) on 3/10/2023 6:51:49 AM (03-10-23 @ 01:40)      RADIOLOGY & ADDITIONAL TESTS:

## 2023-03-12 NOTE — PATIENT PROFILE ADULT - FALL HARM RISK - HARM RISK INTERVENTIONS

## 2023-03-12 NOTE — CONSULT NOTE ADULT - ASSESSMENT
62 year old Male with a pmh of HTN admitted for afib w/ RVR and epistaxis -  now found to have moderate right hydronephrosis on Abdominal US. Patient states that he has no hx of  issues and has never seen a . Per chart review, it was noted that patient's Cr is slowly increasing (though still WNL) while his GFR is slowly decreasing in the setting of moderate right hydronephrosis. Patient at this time is not experiencing any flank pain, groin pain, back pain or LUTS. No fevers chills n/v, SOB/diff breathing     Assessment:  ?ureteral stone vs other obstructive cause of hydro vs pre/intrinsic cause    Plan:  - Recommend CTAP noncon to rule out obstructive etiology   - If no evidence of obstruction on CT imaging, would recommend Mag3 renal scan and Nephrology consult for further evaluation  - Patient voiding well without issues, observed patient voiding adequately at bedside and physical exam not consistent with urinary retention  - Encourage increased hydration  - Encourage increased ambulation  - Continue care per primary team  - Will discuss with attending

## 2023-03-13 LAB
ALBUMIN SERPL ELPH-MCNC: 3.9 G/DL — SIGNIFICANT CHANGE UP (ref 3.5–5.2)
ALBUMIN SERPL ELPH-MCNC: 4 G/DL — SIGNIFICANT CHANGE UP (ref 3.5–5.2)
ALP SERPL-CCNC: 76 U/L — SIGNIFICANT CHANGE UP (ref 30–115)
ALP SERPL-CCNC: 94 U/L — SIGNIFICANT CHANGE UP (ref 30–115)
ALT FLD-CCNC: 53 U/L — HIGH (ref 0–41)
ALT FLD-CCNC: 59 U/L — HIGH (ref 0–41)
ANION GAP SERPL CALC-SCNC: 15 MMOL/L — HIGH (ref 7–14)
ANION GAP SERPL CALC-SCNC: 17 MMOL/L — HIGH (ref 7–14)
APTT BLD: 41.8 SEC — HIGH (ref 27–39.2)
AST SERPL-CCNC: 40 U/L — SIGNIFICANT CHANGE UP (ref 0–41)
AST SERPL-CCNC: 49 U/L — HIGH (ref 0–41)
AT III ACT/NOR PPP CHRO: 67 % — LOW (ref 85–135)
AT III AG PPP IA-MCNC: 20 MG/DL — SIGNIFICANT CHANGE UP (ref 19–31)
BASOPHILS # BLD AUTO: 0.03 K/UL — SIGNIFICANT CHANGE UP (ref 0–0.2)
BASOPHILS NFR BLD AUTO: 0.3 % — SIGNIFICANT CHANGE UP (ref 0–1)
BILIRUB SERPL-MCNC: 0.7 MG/DL — SIGNIFICANT CHANGE UP (ref 0.2–1.2)
BILIRUB SERPL-MCNC: 1.2 MG/DL — SIGNIFICANT CHANGE UP (ref 0.2–1.2)
BLD GP AB SCN SERPL QL: SIGNIFICANT CHANGE UP
BUN SERPL-MCNC: 25 MG/DL — HIGH (ref 10–20)
BUN SERPL-MCNC: 30 MG/DL — HIGH (ref 10–20)
CALCIUM SERPL-MCNC: 9.1 MG/DL — SIGNIFICANT CHANGE UP (ref 8.4–10.5)
CALCIUM SERPL-MCNC: 9.3 MG/DL — SIGNIFICANT CHANGE UP (ref 8.4–10.5)
CHLORIDE SERPL-SCNC: 100 MMOL/L — SIGNIFICANT CHANGE UP (ref 98–110)
CHLORIDE SERPL-SCNC: 103 MMOL/L — SIGNIFICANT CHANGE UP (ref 98–110)
CO2 SERPL-SCNC: 22 MMOL/L — SIGNIFICANT CHANGE UP (ref 17–32)
CO2 SERPL-SCNC: 24 MMOL/L — SIGNIFICANT CHANGE UP (ref 17–32)
CREAT SERPL-MCNC: 1.2 MG/DL — SIGNIFICANT CHANGE UP (ref 0.7–1.5)
CREAT SERPL-MCNC: 1.5 MG/DL — SIGNIFICANT CHANGE UP (ref 0.7–1.5)
EGFR: 53 ML/MIN/1.73M2 — LOW
EGFR: 69 ML/MIN/1.73M2 — SIGNIFICANT CHANGE UP
EOSINOPHIL # BLD AUTO: 0.09 K/UL — SIGNIFICANT CHANGE UP (ref 0–0.7)
EOSINOPHIL NFR BLD AUTO: 1 % — SIGNIFICANT CHANGE UP (ref 0–8)
FACT V ACT/NOR PPP: 87 % — SIGNIFICANT CHANGE UP (ref 50–150)
GLUCOSE SERPL-MCNC: 91 MG/DL — SIGNIFICANT CHANGE UP (ref 70–99)
GLUCOSE SERPL-MCNC: 92 MG/DL — SIGNIFICANT CHANGE UP (ref 70–99)
HCT VFR BLD CALC: 53.1 % — HIGH (ref 42–52)
HGB BLD-MCNC: 18.1 G/DL — HIGH (ref 14–18)
IMM GRANULOCYTES NFR BLD AUTO: 0.6 % — HIGH (ref 0.1–0.3)
INR BLD: 1.21 RATIO — SIGNIFICANT CHANGE UP (ref 0.65–1.3)
LYMPHOCYTES # BLD AUTO: 1.88 K/UL — SIGNIFICANT CHANGE UP (ref 1.2–3.4)
LYMPHOCYTES # BLD AUTO: 20.9 % — SIGNIFICANT CHANGE UP (ref 20.5–51.1)
MAGNESIUM SERPL-MCNC: 2.1 MG/DL — SIGNIFICANT CHANGE UP (ref 1.8–2.4)
MCHC RBC-ENTMCNC: 34.1 G/DL — SIGNIFICANT CHANGE UP (ref 32–37)
MCHC RBC-ENTMCNC: 34.7 PG — HIGH (ref 27–31)
MCV RBC AUTO: 101.9 FL — HIGH (ref 80–94)
MONOCYTES # BLD AUTO: 0.9 K/UL — HIGH (ref 0.1–0.6)
MONOCYTES NFR BLD AUTO: 10 % — HIGH (ref 1.7–9.3)
NEUTROPHILS # BLD AUTO: 6.03 K/UL — SIGNIFICANT CHANGE UP (ref 1.4–6.5)
NEUTROPHILS NFR BLD AUTO: 67.2 % — SIGNIFICANT CHANGE UP (ref 42.2–75.2)
NRBC # BLD: 0 /100 WBCS — SIGNIFICANT CHANGE UP (ref 0–0)
PHOSPHATE SERPL-MCNC: 4.9 MG/DL — SIGNIFICANT CHANGE UP (ref 2.1–4.9)
PLATELET # BLD AUTO: 198 K/UL — SIGNIFICANT CHANGE UP (ref 130–400)
POTASSIUM SERPL-MCNC: 3.9 MMOL/L — SIGNIFICANT CHANGE UP (ref 3.5–5)
POTASSIUM SERPL-MCNC: 4 MMOL/L — SIGNIFICANT CHANGE UP (ref 3.5–5)
POTASSIUM SERPL-SCNC: 3.9 MMOL/L — SIGNIFICANT CHANGE UP (ref 3.5–5)
POTASSIUM SERPL-SCNC: 4 MMOL/L — SIGNIFICANT CHANGE UP (ref 3.5–5)
PROT S FREE PPP-ACNC: 99 % — SIGNIFICANT CHANGE UP (ref 63–140)
PROT SERPL-MCNC: 7 G/DL — SIGNIFICANT CHANGE UP (ref 6–8)
PROT SERPL-MCNC: 7.2 G/DL — SIGNIFICANT CHANGE UP (ref 6–8)
PROTHROM AB SERPL-ACNC: 13.9 SEC — HIGH (ref 9.95–12.87)
RBC # BLD: 5.21 M/UL — SIGNIFICANT CHANGE UP (ref 4.7–6.1)
RBC # FLD: 14.3 % — SIGNIFICANT CHANGE UP (ref 11.5–14.5)
SODIUM SERPL-SCNC: 139 MMOL/L — SIGNIFICANT CHANGE UP (ref 135–146)
SODIUM SERPL-SCNC: 142 MMOL/L — SIGNIFICANT CHANGE UP (ref 135–146)
WBC # BLD: 8.98 K/UL — SIGNIFICANT CHANGE UP (ref 4.8–10.8)
WBC # FLD AUTO: 8.98 K/UL — SIGNIFICANT CHANGE UP (ref 4.8–10.8)

## 2023-03-13 PROCEDURE — 99232 SBSQ HOSP IP/OBS MODERATE 35: CPT

## 2023-03-13 PROCEDURE — 99233 SBSQ HOSP IP/OBS HIGH 50: CPT

## 2023-03-13 PROCEDURE — 93010 ELECTROCARDIOGRAM REPORT: CPT | Mod: 77

## 2023-03-13 PROCEDURE — 78708 K FLOW/FUNCT IMAGE W/DRUG: CPT | Mod: 26

## 2023-03-13 PROCEDURE — 93010 ELECTROCARDIOGRAM REPORT: CPT

## 2023-03-13 RX ORDER — AMIODARONE HYDROCHLORIDE 400 MG/1
0.5 TABLET ORAL
Qty: 450 | Refills: 0 | Status: DISCONTINUED | OUTPATIENT
Start: 2023-03-13 | End: 2023-03-16

## 2023-03-13 RX ORDER — AMIODARONE HYDROCHLORIDE 400 MG/1
1 TABLET ORAL
Qty: 450 | Refills: 0 | Status: DISCONTINUED | OUTPATIENT
Start: 2023-03-13 | End: 2023-03-16

## 2023-03-13 RX ORDER — METOPROLOL TARTRATE 50 MG
12.5 TABLET ORAL
Refills: 0 | Status: DISCONTINUED | OUTPATIENT
Start: 2023-03-13 | End: 2023-03-13

## 2023-03-13 RX ORDER — METOPROLOL TARTRATE 50 MG
2.5 TABLET ORAL ONCE
Refills: 0 | Status: DISCONTINUED | OUTPATIENT
Start: 2023-03-13 | End: 2023-03-13

## 2023-03-13 RX ORDER — METOPROLOL TARTRATE 50 MG
12.5 TABLET ORAL
Refills: 0 | Status: DISCONTINUED | OUTPATIENT
Start: 2023-03-13 | End: 2023-03-14

## 2023-03-13 RX ORDER — AMIODARONE HYDROCHLORIDE 400 MG/1
150 TABLET ORAL ONCE
Refills: 0 | Status: COMPLETED | OUTPATIENT
Start: 2023-03-13 | End: 2023-03-13

## 2023-03-13 RX ADMIN — PANTOPRAZOLE SODIUM 40 MILLIGRAM(S): 20 TABLET, DELAYED RELEASE ORAL at 06:04

## 2023-03-13 RX ADMIN — Medication 1 SPRAY(S): at 17:46

## 2023-03-13 RX ADMIN — AMIODARONE HYDROCHLORIDE 200 MILLIGRAM(S): 400 TABLET ORAL at 05:06

## 2023-03-13 RX ADMIN — AMIODARONE HYDROCHLORIDE 600 MILLIGRAM(S): 400 TABLET ORAL at 19:27

## 2023-03-13 RX ADMIN — AMIODARONE HYDROCHLORIDE 33.3 MG/MIN: 400 TABLET ORAL at 19:28

## 2023-03-13 RX ADMIN — APIXABAN 5 MILLIGRAM(S): 2.5 TABLET, FILM COATED ORAL at 11:10

## 2023-03-13 RX ADMIN — Medication 12.5 MILLIGRAM(S): at 14:20

## 2023-03-13 RX ADMIN — Medication 12.5 MILLIGRAM(S): at 17:46

## 2023-03-13 RX ADMIN — APIXABAN 5 MILLIGRAM(S): 2.5 TABLET, FILM COATED ORAL at 21:32

## 2023-03-13 RX ADMIN — Medication 1 SPRAY(S): at 05:10

## 2023-03-13 RX ADMIN — DAPAGLIFLOZIN 10 MILLIGRAM(S): 10 TABLET, FILM COATED ORAL at 11:11

## 2023-03-13 RX ADMIN — TAMSULOSIN HYDROCHLORIDE 0.4 MILLIGRAM(S): 0.4 CAPSULE ORAL at 21:32

## 2023-03-13 NOTE — PROGRESS NOTE ADULT - SUBJECTIVE AND OBJECTIVE BOX
24H events:    Patient is a 62y old Male who presents with a chief complaint of Afib w/ RVR, epistaxis (13 Mar 2023 11:01)    Primary diagnosis of Atrial fibrillation, new onset       Today is hospital day 4d. This morning patient was seen and examined at bedside, resting comfortably in bed.      PAST MEDICAL & SURGICAL HISTORY    SOCIAL HISTORY:  Social History:  Active smoker 1PPD  Daily EtOH use 2 beers/day  No substance use (09 Mar 2023 22:40)      ALLERGIES:  No Known Allergies    MEDICATIONS:  STANDING MEDICATIONS  aMIOdarone    Tablet 200 milliGRAM(s) Oral daily  apixaban 5 milliGRAM(s) Oral every 12 hours  dapagliflozin 10 milliGRAM(s) Oral every 24 hours  influenza   Vaccine 0.5 milliLiter(s) IntraMuscular once  metoprolol tartrate 12.5 milliGRAM(s) Oral two times a day  pantoprazole    Tablet 40 milliGRAM(s) Oral before breakfast  sodium chloride 0.65% Nasal 1 Spray(s) Both Nostrils two times a day  tamsulosin 0.4 milliGRAM(s) Oral at bedtime    PRN MEDICATIONS  acetaminophen     Tablet .. 650 milliGRAM(s) Oral every 6 hours PRN  melatonin 5 milliGRAM(s) Oral at bedtime PRN        03-12-23 @ 07:01  -  03-13-23 @ 07:00  --------------------------------------------------------  IN: 720 mL / OUT: 0 mL / NET: 720 mL        LABS:                        18.1   8.98  )-----------( 198      ( 13 Mar 2023 05:07 )             53.1     03-13    142  |  103  |  25<H>  ----------------------------<  92  4.0   |  22  |  1.2    Ca    9.3      13 Mar 2023 05:07  Phos  4.9     03-13  Mg     2.1     03-13    TPro  7.0  /  Alb  3.9  /  TBili  1.2  /  DBili  x   /  AST  40  /  ALT  53<H>  /  AlkPhos  76  03-13    PT/INR - ( 13 Mar 2023 05:07 )   PT: 13.90 sec;   INR: 1.21 ratio         PTT - ( 13 Mar 2023 05:07 )  PTT:41.8 sec                RADIOLOGY:    VITALS:   T(F): 97.4  HR: 66  BP: 142/83  RR: 18  SpO2: 94%    PHYSICAL EXAM:    GENERAL: NAD  HEAD:  Atraumatic, Normocephalic  NERVOUS SYSTEM:  Alert & Oriented X3  CHEST/LUNG: Clear to auscultation bilaterally; No rales, rhonchi, wheezing, or rubs  HEART: Regular rate and rhythm; No audible murmurs  ABDOMEN: Soft, Nontender, Nondistended; Bowel sounds present  EXTREMITIES: trace edema  LYMPH: No lymphadenopathy noted  SKIN: No rashes or lesions

## 2023-03-13 NOTE — PROGRESS NOTE ADULT - ATTENDING COMMENTS
Impression   # A fib now in NSR  # Cardiomyopathy - likely tachycardia induced     Recommendations   - Continue amiodarone as per EP   - Continue GDMT: change metoprolol tartrate to succinate, add Losartan or Entresto if able, continue farxiga   - Will need repeat TTE in 1 month. If no improvement in EF then ischemic workup will need to be done as outpatient

## 2023-03-13 NOTE — PROGRESS NOTE ADULT - SUBJECTIVE AND OBJECTIVE BOX
Outpt cardiologist:    Reason for Consult:     HISTORY OF PRESENT ILLNESS:  62M with PMHx of HTN presents to the ED for epistaxis and palpitations x1 day. Pt reports that he had a nosebleed yesterday that has not stopped since then, so he presented for evaluation. He also complains of palpitations and an "anxious feeling" intermittent over the past few months. On further questioning pt reports that he hasn't seen a PCP in over a year, and during that period he has been having orthopnea, exertional dyspnea, and occasional LE swelling. He was on HTN meds but ran out (and was titrating them) a month ago and has been using his girlfriend metoprolol 25mg qd since then. He denies any CP, abd pain, N/V/C/D, recent travel or sick contacts, or any  symptoms.    In the ED: /97, , T 98F, RR 20 satting 99% on RA. Labs notable for AG 20 and mildly elevated AST/ALT but otherwise unremarkable. EKG showed Afib w/ RVR and RBBB. CXR mild cardiomegaly with no consolidations.   Pt received TXA and IV cardizem then given PO. Admitted to telemetry.  (09 Mar 2023 22:40)      Cardiology Fellow Notes    Previous Cardiac Workup    Risk Factors:      PAST MEDICAL & SURGICAL HISTORY      FAMILY HISTORY:  FAMILY HISTORY:      SOCIAL HISTORY:  Social History:  Active smoker 1PPD  Daily EtOH use 2 beers/day  No substance use (09 Mar 2023 22:40)      ALLERGIES:  No Known Allergies      MEDICATIONS:  aMIOdarone    Tablet 200 milliGRAM(s) Oral daily  apixaban 5 milliGRAM(s) Oral every 12 hours  dapagliflozin 10 milliGRAM(s) Oral every 24 hours  influenza   Vaccine 0.5 milliLiter(s) IntraMuscular once  metoprolol tartrate 12.5 milliGRAM(s) Oral two times a day  pantoprazole    Tablet 40 milliGRAM(s) Oral before breakfast  sodium chloride 0.65% Nasal 1 Spray(s) Both Nostrils two times a day  tamsulosin 0.4 milliGRAM(s) Oral at bedtime    PRN:  acetaminophen     Tablet .. 650 milliGRAM(s) Oral every 6 hours PRN  melatonin 5 milliGRAM(s) Oral at bedtime PRN      HOME MEDICATIONS:  Home Medications:  Metoprolol Succinate ER 25 mg oral tablet, extended release: 1 tab(s) orally once a day (09 Mar 2023 23:15)      VITALS:   T(F): 97.4 (03-13 @ 04:41), Max: 98.3 (03-12 @ 01:06)  HR: 66 (03-13 @ 04:41) (66 - 129)  BP: 142/83 (03-13 @ 04:41) (127/69 - 156/109)  BP(mean): 104 (03-12 @ 06:30) (92 - 104)  RR: 18 (03-13 @ 04:41) (18 - 18)  SpO2: 94% (03-12 @ 19:58) (94% - 98%)    I&O's Summary    12 Mar 2023 07:01  -  13 Mar 2023 07:00  --------------------------------------------------------  IN: 720 mL / OUT: 0 mL / NET: 720 mL        REVIEW OF SYSTEMS:  CONSTITUTIONAL: No weakness, fevers or chills  HEENT: No visual changes, neck/ear pain  RESPIRATORY: No cough, sob  CARDIOVASCULAR: See HPI  GASTROINTESTINAL: No abdominal pain. No nausea, vomiting, diarrhea   GENITOURINARY: No dysuria, frequency or hematuria  NEUROLOGICAL: No new focal deficits  SKIN: No new rashes    PHYSICAL EXAM:  General: Not in distress.  Non-toxic appearing.   HEENT: EOMI  Cardio: regular, S1, S2, no murmur  Pulm: B/L BS.  No wheezing / crackles / rales  Abdomen: Soft, non-tender, non-distended. Normoactive bowel sounds  Extremities: No edema b/l le  Neuro: A&O x3. No focal deficits    LABS:                        18.1   8.98  )-----------( 198      ( 13 Mar 2023 05:07 )             53.1     03-13    142  |  103  |  25<H>  ----------------------------<  92  4.0   |  22  |  1.2    Ca    9.3      13 Mar 2023 05:07  Phos  4.9     03-13  Mg     2.1     03-13    TPro  7.0  /  Alb  3.9  /  TBili  1.2  /  DBili  x   /  AST  40  /  ALT  53<H>  /  AlkPhos  76  03-13    PT/INR - ( 13 Mar 2023 05:07 )   PT: 13.90 sec;   INR: 1.21 ratio         PTT - ( 13 Mar 2023 05:07 )  PTT:41.8 sec          Troponin trend:      03-10 Chol 143 LDL -- HDL 47 Trig 89  COVID-19 PCR: NotDetec (12 Mar 2023 09:23)  COVID-19 PCR: NotDetec (09 Mar 2023 20:29)      IMAGING:  -TTE:  -TTE:  < from: TTE Echo Complete w/ Contrast w/ Doppler (03.10.23 @ 16:49) >  Summary:   1. Moderately decreased global left ventricular systolic function with   mild concentric left ventricular hypertrophy.. Left ventricular ejection   fraction, by visual estimation, is 35 to 40%.   2. Endocardial visualization was enhancedwith intravenous echo   contrast. There is no evidence of LV thrombus.   3. Diastolic function could not be assessed in this study due to atrial   arrhythmia.   4. Normal right ventricular size with moderately reduced systolic   function.   5. Mildlyenlarged left atrium.   6. No hemodynamically significant valvular disease.   7. Mild pulmonary hypertension (PASP = 42mmHg).   8. No pericardial effusion.   9. NB: patient was in rapid atrial fibrillation during this exam.      ECG:  NSR    TELEMETRY EVENTS:  NSR   Outpt cardiologist:    Reason for Consult:     HISTORY OF PRESENT ILLNESS:    62M with PMHx of HTN presents to the ED for epistaxis and palpitations x1 day. Pt reports that he had a nosebleed yesterday that has not stopped since then, so he presented for evaluation. He also complains of palpitations and an "anxious feeling" intermittent over the past few months. On further questioning pt reports that he hasn't seen a PCP in over a year, and during that period he has been having orthopnea, exertional dyspnea, and occasional LE swelling. He was on HTN meds but ran out (and was titrating them) a month ago and has been using his girlfriend metoprolol 25mg qd since then. He denies any CP, abd pain, N/V/C/D, recent travel or sick contacts, or any  symptoms.    In the ED: /97, , T 98F, RR 20 satting 99% on RA. Labs notable for AG 20 and mildly elevated AST/ALT but otherwise unremarkable. EKG showed Afib w/ RVR and RBBB. CXR mild cardiomegaly with no consolidations.   Pt received TXA and IV cardizem then given PO. Admitted to telemetry.  (09 Mar 2023 22:40)      Cardiology Fellow Notes    Previous Cardiac Workup    Risk Factors:      PAST MEDICAL & SURGICAL HISTORY      FAMILY HISTORY:  FAMILY HISTORY:      SOCIAL HISTORY:  Social History:  Active smoker 1PPD  Daily EtOH use 2 beers/day  No substance use (09 Mar 2023 22:40)      ALLERGIES:  No Known Allergies      MEDICATIONS:  aMIOdarone    Tablet 200 milliGRAM(s) Oral daily  apixaban 5 milliGRAM(s) Oral every 12 hours  dapagliflozin 10 milliGRAM(s) Oral every 24 hours  influenza   Vaccine 0.5 milliLiter(s) IntraMuscular once  metoprolol tartrate 12.5 milliGRAM(s) Oral two times a day  pantoprazole    Tablet 40 milliGRAM(s) Oral before breakfast  sodium chloride 0.65% Nasal 1 Spray(s) Both Nostrils two times a day  tamsulosin 0.4 milliGRAM(s) Oral at bedtime    PRN:  acetaminophen     Tablet .. 650 milliGRAM(s) Oral every 6 hours PRN  melatonin 5 milliGRAM(s) Oral at bedtime PRN      HOME MEDICATIONS:  Home Medications:  Metoprolol Succinate ER 25 mg oral tablet, extended release: 1 tab(s) orally once a day (09 Mar 2023 23:15)      VITALS:   T(F): 97.4 (03-13 @ 04:41), Max: 98.3 (03-12 @ 01:06)  HR: 66 (03-13 @ 04:41) (66 - 129)  BP: 142/83 (03-13 @ 04:41) (127/69 - 156/109)  BP(mean): 104 (03-12 @ 06:30) (92 - 104)  RR: 18 (03-13 @ 04:41) (18 - 18)  SpO2: 94% (03-12 @ 19:58) (94% - 98%)    I&O's Summary    12 Mar 2023 07:01  -  13 Mar 2023 07:00  --------------------------------------------------------  IN: 720 mL / OUT: 0 mL / NET: 720 mL        REVIEW OF SYSTEMS:  CONSTITUTIONAL: No weakness, fevers or chills  HEENT: No visual changes, neck/ear pain  RESPIRATORY: No cough, sob  CARDIOVASCULAR: See HPI  GASTROINTESTINAL: No abdominal pain. No nausea, vomiting, diarrhea   GENITOURINARY: No dysuria, frequency or hematuria  NEUROLOGICAL: No new focal deficits  SKIN: No new rashes    PHYSICAL EXAM:  General: Not in distress.  Non-toxic appearing.   HEENT: EOMI  Cardio: regular, S1, S2, no murmur  Pulm: B/L BS.  No wheezing / crackles / rales  Abdomen: Soft, non-tender, non-distended. Normoactive bowel sounds  Extremities: No edema b/l le  Neuro: A&O x3. No focal deficits    LABS:                        18.1   8.98  )-----------( 198      ( 13 Mar 2023 05:07 )             53.1     03-13    142  |  103  |  25<H>  ----------------------------<  92  4.0   |  22  |  1.2    Ca    9.3      13 Mar 2023 05:07  Phos  4.9     03-13  Mg     2.1     03-13    TPro  7.0  /  Alb  3.9  /  TBili  1.2  /  DBili  x   /  AST  40  /  ALT  53<H>  /  AlkPhos  76  03-13    PT/INR - ( 13 Mar 2023 05:07 )   PT: 13.90 sec;   INR: 1.21 ratio         PTT - ( 13 Mar 2023 05:07 )  PTT:41.8 sec          Troponin trend:      03-10 Chol 143 LDL -- HDL 47 Trig 89  COVID-19 PCR: NotDetec (12 Mar 2023 09:23)  COVID-19 PCR: NotDetec (09 Mar 2023 20:29)      IMAGING:  -TTE:  -TTE:  < from: TTE Echo Complete w/ Contrast w/ Doppler (03.10.23 @ 16:49) >  Summary:   1. Moderately decreased global left ventricular systolic function with   mild concentric left ventricular hypertrophy.. Left ventricular ejection   fraction, by visual estimation, is 35 to 40%.   2. Endocardial visualization was enhancedwith intravenous echo   contrast. There is no evidence of LV thrombus.   3. Diastolic function could not be assessed in this study due to atrial   arrhythmia.   4. Normal right ventricular size with moderately reduced systolic   function.   5. Mildlyenlarged left atrium.   6. No hemodynamically significant valvular disease.   7. Mild pulmonary hypertension (PASP = 42mmHg).   8. No pericardial effusion.   9. NB: patient was in rapid atrial fibrillation during this exam.      ECG:  NSR    TELEMETRY EVENTS:  NSR

## 2023-03-13 NOTE — CHART NOTE - NSCHARTNOTEFT_GEN_A_CORE
patient went into an arrhythmia  HR on tele in 140-150s range    No clear inciting event  EKG done, showing possible flutter vs afib although it looks regular    With the patient being on amiodarone, will do trial of 150mg IV once    If HR remains uncontrolled, will place on amiodarone drip

## 2023-03-13 NOTE — PROGRESS NOTE ADULT - ASSESSMENT
62M with PMHx of HTN presents to the ED for epistaxis and palpitations x1 day. Pt reports that he had a nosebleed yesterday that has not stopped since then, so he presented for evaluation. He also complains of palpitations and an "anxious feeling" intermittent over the past few months. On further questioning pt reports that he hasn't seen a PCP in over a year, and during that period he has been having orthopnea, exertional dyspnea, and occasional LE swelling.     #New-onset  Paroxysmal Afib w/ RVR - reverted   #acute HFrEF  - , /97, months of intermittent palpitations, orthopnea, LE swelling, exertional dyspnea  - EKG Afib w/ RVR and RBBB  - CXR mild cardiomegaly but no consolidations/effusions  - s/p cardizem IV and PO   - CHADsVASC 2-3  - ECHO: 35 to 40 EF  - TSH within normal limits   - a1c 5.7  - trop neg x3   - Cardio onboard; Will need repeat TTE in 1 month. If no improvement in EF then ischemic workup can be considered   - c/w Eliquis, Dapagliflozin, metoprolol, amiodarone, consider ACE/ARB after Mag3 scan (if needed by urology)  - Lasix held due elevated Cr    #HTN  #HTN Urgency due to medication non-compliance  #Epistaxis  - Presented with /97 and mild epistaxis   - BP improved to 135/91 in ED  - s/p TXA for epistaxis  - metoprolol tartrate 12.5 mg BID    #Prediabetic  - a1C 5.7  - c/w Dapagliflozin    #Right renal hydro  - CTAP noncontrast; no obstruction  - Pending further Urology reccs    #suspected SANJU  - Sleep study outpatient    #Transaminitis  - likely 2/2 hepatic congestion  - Resolving    #macrocytosis  - f/u b12 and folate, pt has drinking hx

## 2023-03-13 NOTE — PROGRESS NOTE ADULT - ASSESSMENT
61 yo M PMHx HTN presented for evaluation of epistaxis and palpitations x1 day. Pt reports that he had a nosebleed the day prior to presentation that had not stopped since then, so he come to ER. He also complained of palpitations and an "anxious feeling" intermittent over the past few months. On further questioning pt reports that he hasn't seen a PCP in over a year, and during that period he has been having orthopnea, exertional dyspnea, and occasional LE swelling.     New-onset  Paroxysmal Afib w/ RVR  Acute HFrEF,likely tachycardia induced cardiomyopathy  - , /97, months of intermittent palpitations, orthopnea, LE swelling, exertional dyspnea  - EKG Afib w/ RVR and RBBB  - CXR mild cardiomegaly but no consolidations/effusions  - s/p cardizem IV and PO   - CHADsVASC 2-3    - pt currently rate controlled  - c/w Eliquis, amio, metoprolol (change to succinate), add losartan after lasix scan    Hydronephrosis  - incidentally found on RUQ sono  - urology consulted, recomended CT abdomen and then Lasix scan  - no evidence of definite obstructing lesion or nephrolithiasis is not identified.   - as per urology this work up needs to be inpatient  - awaiting lasix scan        HTN  HTN Urgency due to medication non-adherance  - bp now controlled  - hold lasix and losartan (last dose of lasix was 3/11)  - restart losartan after lasix scan    Epistaxis  - TXA given  - resolved  - no episodes while on a/c    PreDM  - a1c 5.7  - diet and exercise modifications    Transaminitis  - likely due to congestion hepatopathy  - RUQ sono unremarkable for hepatobiliary pathology    Macrocytosis  - b12, folate pending      #Progress Note Handoff  Pending (specify):  follow cardiology, ep, cardiac telemonitoring, labs  Family discussion: I spoke with pt at bedside regarding HFref, lasix scan  Disposition: Pt will [place] [facility] [place] [facility]       63 yo M PMHx HTN presented for evaluation of epistaxis and palpitations x1 day. Pt reports that he had a nosebleed the day prior to presentation that had not stopped since then, so he come to ER. He also complained of palpitations and an "anxious feeling" intermittent over the past few months. On further questioning pt reports that he hasn't seen a PCP in over a year, and during that period he has been having orthopnea, exertional dyspnea, and occasional LE swelling.     New-onset  Paroxysmal Afib w/ RVR  Acute HFrEF,likely tachycardia induced cardiomyopathy  - , /97, months of intermittent palpitations, orthopnea, LE swelling, exertional dyspnea  - EKG Afib w/ RVR and RBBB  - CXR mild cardiomegaly but no consolidations/effusions  - s/p cardizem IV and PO   - CHADsVASC 2-3    - pt currently rate controlled  - c/w Eliquis, amio, metoprolol (change to succinate), add losartan after lasix scan  - needs losartan, Farixga, metoprolol succinate upon discharge. Repeat echo as outpt. If EF still low will need ischemic work up    Hydronephrosis  - incidentally found on RUQ sono  - urology consulted, recomended CT abdomen and then Lasix scan  - no evidence of definite obstructing lesion or nephrolithiasis is not identified.   - as per urology this work up needs to be inpatient  - awaiting lasix scan        HTN  HTN Urgency due to medication non-adherance  - bp now controlled  - hold lasix and losartan (last dose of lasix was 3/11)  - restart losartan after lasix scan    Epistaxis  - TXA given  - resolved  - no episodes while on a/c    PreDM  - a1c 5.7  - diet and exercise modifications    Transaminitis  - likely due to congestion hepatopathy  - RUQ sono unremarkable for hepatobiliary pathology    Macrocytosis  - b12, folate pending      #Progress Note Handoff  Pending (specify):  follow cardiology, ep, cardiac telemonitoring, labs  Family discussion: I spoke with pt at bedside regarding HFref, lasix scan  Disposition: Pt will need primary care physician appointment at [facility], home

## 2023-03-13 NOTE — PROGRESS NOTE ADULT - ASSESSMENT
Impression   # A fib now in NSR  # Cardiomyopathy - likely tachycardia induced     Recommendations   - Continue amiodarone as per EP   - Continue GDMT: change metoprolol tartrate to succinate, add Losartan or Entresto if able, continue farxiga   - Will need repeat TTE in 1 month. If no improvement in EF then ischemic workup can be considered     Discussed with attending.   Signature: Jovana GRANADOS, 1st year Cardiology Fellow

## 2023-03-13 NOTE — PROGRESS NOTE ADULT - SUBJECTIVE AND OBJECTIVE BOX
CHIEF COMPLAINT:    Patient is a 62y old  Male who presents with a chief complaint of Afib w/ RVR, epistaxis     INTERVAL HPI/OVERNIGHT EVENTS:    Patient seen and examined at bedside. No acute overnight events occurred.    ROS: Denies SOB, chest pain, dysuria, change in urinary habits. All other systems are negative.    Medications:  Standing  aMIOdarone    Tablet 200 milliGRAM(s) Oral daily  apixaban 5 milliGRAM(s) Oral every 12 hours  dapagliflozin 10 milliGRAM(s) Oral every 24 hours  influenza   Vaccine 0.5 milliLiter(s) IntraMuscular once  metoprolol tartrate 12.5 milliGRAM(s) Oral two times a day  pantoprazole    Tablet 40 milliGRAM(s) Oral before breakfast  sodium chloride 0.65% Nasal 1 Spray(s) Both Nostrils two times a day  tamsulosin 0.4 milliGRAM(s) Oral at bedtime    PRN Meds  acetaminophen     Tablet .. 650 milliGRAM(s) Oral every 6 hours PRN  melatonin 5 milliGRAM(s) Oral at bedtime PRN        Vital Signs:    T(F): 98 (03-13-23 @ 12:43), Max: 98 (03-13-23 @ 12:43)  HR: 70 (03-13-23 @ 12:43) (66 - 73)  BP: 177/77 (03-13-23 @ 12:43) (142/83 - 177/77)  RR: 17 (03-13-23 @ 12:43) (17 - 18)  SpO2: 94% (03-12-23 @ 19:58) (94% - 94%)  I&O's Summary    12 Mar 2023 07:01  -  13 Mar 2023 07:00  --------------------------------------------------------  IN: 720 mL / OUT: 0 mL / NET: 720 mL        PHYSICAL EXAM:  GENERAL:  NAD  SKIN: No rashes or lesions  HEENT: Atraumatic. Normocephalic. Anicteric  NECK:  No JVD.   PULMONARY: Clear to ausculation bilaterally. No wheezing. No rales  CVS: Normal S1, S2. Regular rate and rhythm. No murmurs.  ABDOMEN/GI: Soft, Nontender, Nondistended; Bowel sounds are present  EXTREMITIES:  No edema B/L LE.  NEUROLOGIC:  No motor deficit.  PSYCH: Alert & oriented x 3, normal affect      LABS:                        18.1   8.98  )-----------( 198      ( 13 Mar 2023 05:07 )             53.1     03-13    142  |  103  |  25<H>  ----------------------------<  92  4.0   |  22  |  1.2    Ca    9.3      13 Mar 2023 05:07  Phos  4.9     03-13  Mg     2.1     03-13    TPro  7.0  /  Alb  3.9  /  TBili  1.2  /  DBili  x   /  AST  40  /  ALT  53<H>  /  AlkPhos  76  03-13    PT/INR - ( 13 Mar 2023 05:07 )   PT: 13.90 sec;   INR: 1.21 ratio         PTT - ( 13 Mar 2023 05:07 )  PTT:41.8 sec    Trop <0.01, CKMB --, CK --, 03-11-23 @ 07:43  Trop <0.01, CKMB --, CK --, 03-10-23 @ 21:42        RADIOLOGY & ADDITIONAL TESTS:  Imaging or report Personally Reviewed:  [ ] YES  [ ] NO -->no new images    Telemetry reviewed independently - NSR, no acute events  EKG reviewed independently -->no new EKGs    Consultant(s) Notes Reviewed:  [ ] YES  [ ] NO  Care Discussed with Consultants/Other Providers [ ] YES  [ ] NO    Case discussed with resident  Care discussed with pt         CHIEF COMPLAINT:    Patient is a 62y old  Male who presents with a chief complaint of Afib w/ RVR, epistaxis     INTERVAL HPI/OVERNIGHT EVENTS:    Patient seen and examined at bedside. No acute overnight events occurred.    ROS: Denies SOB, chest pain, dysuria, change in urinary habits. All other systems are negative.    Medications:  Standing  aMIOdarone    Tablet 200 milliGRAM(s) Oral daily  apixaban 5 milliGRAM(s) Oral every 12 hours  dapagliflozin 10 milliGRAM(s) Oral every 24 hours  influenza   Vaccine 0.5 milliLiter(s) IntraMuscular once  metoprolol tartrate 12.5 milliGRAM(s) Oral two times a day  pantoprazole    Tablet 40 milliGRAM(s) Oral before breakfast  sodium chloride 0.65% Nasal 1 Spray(s) Both Nostrils two times a day  tamsulosin 0.4 milliGRAM(s) Oral at bedtime    PRN Meds  acetaminophen     Tablet .. 650 milliGRAM(s) Oral every 6 hours PRN  melatonin 5 milliGRAM(s) Oral at bedtime PRN        Vital Signs:    T(F): 98 (03-13-23 @ 12:43), Max: 98 (03-13-23 @ 12:43)  HR: 70 (03-13-23 @ 12:43) (66 - 73)  BP: 177/77 (03-13-23 @ 12:43) (142/83 - 177/77)  RR: 17 (03-13-23 @ 12:43) (17 - 18)  SpO2: 94% (03-12-23 @ 19:58) (94% - 94%)  I&O's Summary    12 Mar 2023 07:01  -  13 Mar 2023 07:00  --------------------------------------------------------  IN: 720 mL / OUT: 0 mL / NET: 720 mL        PHYSICAL EXAM:  GENERAL:  NAD  SKIN: No rashes or lesions  HEENT: Atraumatic. Normocephalic. Anicteric  NECK:  No JVD.   PULMONARY: Clear to ausculation bilaterally. No wheezing. No rales  CVS: Normal S1, S2. Regular rate and rhythm. No murmurs.  ABDOMEN/GI: Soft, Nontender, Nondistended; Bowel sounds are present  EXTREMITIES:  No edema B/L LE.  NEUROLOGIC:  No motor deficit.  PSYCH: Alert & oriented x 3, normal affect      LABS:                        18.1   8.98  )-----------( 198      ( 13 Mar 2023 05:07 )             53.1     03-13    142  |  103  |  25<H>  ----------------------------<  92  4.0   |  22  |  1.2    Ca    9.3      13 Mar 2023 05:07  Phos  4.9     03-13  Mg     2.1     03-13    TPro  7.0  /  Alb  3.9  /  TBili  1.2  /  DBili  x   /  AST  40  /  ALT  53<H>  /  AlkPhos  76  03-13    PT/INR - ( 13 Mar 2023 05:07 )   PT: 13.90 sec;   INR: 1.21 ratio         PTT - ( 13 Mar 2023 05:07 )  PTT:41.8 sec    Trop <0.01, CKMB --, CK --, 03-11-23 @ 07:43  Trop <0.01, CKMB --, CK --, 03-10-23 @ 21:42        RADIOLOGY & ADDITIONAL TESTS:  Imaging or report Personally Reviewed:  [ ] YES  [ ] NO -->no new images    Telemetry reviewed independently - RBBB, triplets  EKG reviewed independently -->no new EKGs    Consultant(s) Notes Reviewed:  [ ] YES  [ ] NO  Care Discussed with Consultants/Other Providers [ ] YES  [ ] NO    Case discussed with resident  Care discussed with pt

## 2023-03-14 LAB
ALBUMIN SERPL ELPH-MCNC: 3.9 G/DL — SIGNIFICANT CHANGE UP (ref 3.5–5.2)
ALP SERPL-CCNC: 78 U/L — SIGNIFICANT CHANGE UP (ref 30–115)
ALT FLD-CCNC: 65 U/L — HIGH (ref 0–41)
ANION GAP SERPL CALC-SCNC: 18 MMOL/L — HIGH (ref 7–14)
AST SERPL-CCNC: 52 U/L — HIGH (ref 0–41)
BILIRUB SERPL-MCNC: 1.1 MG/DL — SIGNIFICANT CHANGE UP (ref 0.2–1.2)
BUN SERPL-MCNC: 22 MG/DL — HIGH (ref 10–20)
CALCIUM SERPL-MCNC: 9.5 MG/DL — SIGNIFICANT CHANGE UP (ref 8.4–10.4)
CHLORIDE SERPL-SCNC: 101 MMOL/L — SIGNIFICANT CHANGE UP (ref 98–110)
CO2 SERPL-SCNC: 19 MMOL/L — SIGNIFICANT CHANGE UP (ref 17–32)
CREAT SERPL-MCNC: 1.2 MG/DL — SIGNIFICANT CHANGE UP (ref 0.7–1.5)
EGFR: 69 ML/MIN/1.73M2 — SIGNIFICANT CHANGE UP
GLUCOSE SERPL-MCNC: 103 MG/DL — HIGH (ref 70–99)
HCT VFR BLD CALC: 55.3 % — HIGH (ref 42–52)
HGB BLD-MCNC: 18.9 G/DL — HIGH (ref 14–18)
MAGNESIUM SERPL-MCNC: 2.1 MG/DL — SIGNIFICANT CHANGE UP (ref 1.8–2.4)
MCHC RBC-ENTMCNC: 34.2 G/DL — SIGNIFICANT CHANGE UP (ref 32–37)
MCHC RBC-ENTMCNC: 34.7 PG — HIGH (ref 27–31)
MCV RBC AUTO: 101.7 FL — HIGH (ref 80–94)
NRBC # BLD: 0 /100 WBCS — SIGNIFICANT CHANGE UP (ref 0–0)
PLATELET # BLD AUTO: 200 K/UL — SIGNIFICANT CHANGE UP (ref 130–400)
POTASSIUM SERPL-MCNC: 4.2 MMOL/L — SIGNIFICANT CHANGE UP (ref 3.5–5)
POTASSIUM SERPL-SCNC: 4.2 MMOL/L — SIGNIFICANT CHANGE UP (ref 3.5–5)
PROT SERPL-MCNC: 7.2 G/DL — SIGNIFICANT CHANGE UP (ref 6–8)
RBC # BLD: 5.44 M/UL — SIGNIFICANT CHANGE UP (ref 4.7–6.1)
RBC # FLD: 14.2 % — SIGNIFICANT CHANGE UP (ref 11.5–14.5)
SODIUM SERPL-SCNC: 138 MMOL/L — SIGNIFICANT CHANGE UP (ref 135–146)
WBC # BLD: 10.27 K/UL — SIGNIFICANT CHANGE UP (ref 4.8–10.8)
WBC # FLD AUTO: 10.27 K/UL — SIGNIFICANT CHANGE UP (ref 4.8–10.8)

## 2023-03-14 PROCEDURE — 99232 SBSQ HOSP IP/OBS MODERATE 35: CPT

## 2023-03-14 RX ORDER — AMIODARONE HYDROCHLORIDE 400 MG/1
200 TABLET ORAL
Refills: 0 | Status: DISCONTINUED | OUTPATIENT
Start: 2023-03-14 | End: 2023-03-16

## 2023-03-14 RX ORDER — METOPROLOL TARTRATE 50 MG
25 TABLET ORAL EVERY 6 HOURS
Refills: 0 | Status: DISCONTINUED | OUTPATIENT
Start: 2023-03-14 | End: 2023-03-16

## 2023-03-14 RX ADMIN — TAMSULOSIN HYDROCHLORIDE 0.4 MILLIGRAM(S): 0.4 CAPSULE ORAL at 21:44

## 2023-03-14 RX ADMIN — AMIODARONE HYDROCHLORIDE 16.7 MG/MIN: 400 TABLET ORAL at 01:10

## 2023-03-14 RX ADMIN — Medication 25 MILLIGRAM(S): at 17:37

## 2023-03-14 RX ADMIN — AMIODARONE HYDROCHLORIDE 200 MILLIGRAM(S): 400 TABLET ORAL at 06:36

## 2023-03-14 RX ADMIN — AMIODARONE HYDROCHLORIDE 200 MILLIGRAM(S): 400 TABLET ORAL at 20:14

## 2023-03-14 RX ADMIN — Medication 1 SPRAY(S): at 06:37

## 2023-03-14 RX ADMIN — Medication 1 SPRAY(S): at 18:09

## 2023-03-14 RX ADMIN — Medication 25 MILLIGRAM(S): at 04:15

## 2023-03-14 RX ADMIN — APIXABAN 5 MILLIGRAM(S): 2.5 TABLET, FILM COATED ORAL at 09:01

## 2023-03-14 RX ADMIN — APIXABAN 5 MILLIGRAM(S): 2.5 TABLET, FILM COATED ORAL at 21:43

## 2023-03-14 RX ADMIN — Medication 25 MILLIGRAM(S): at 11:51

## 2023-03-14 RX ADMIN — PANTOPRAZOLE SODIUM 40 MILLIGRAM(S): 20 TABLET, DELAYED RELEASE ORAL at 06:36

## 2023-03-14 NOTE — PROGRESS NOTE ADULT - SUBJECTIVE AND OBJECTIVE BOX
24H events:    Patient is a 62y old Male who presents with a chief complaint of Afib w/ RVR, epistaxis (14 Mar 2023 13:01)    Primary diagnosis of Atrial fibrillation, new onset       Today is hospital day 5d. This morning patient was seen and examined at bedside, resting comfortably in bed.      PAST MEDICAL & SURGICAL HISTORY    SOCIAL HISTORY:  Social History:  Active smoker 1PPD  Daily EtOH use 2 beers/day  No substance use (09 Mar 2023 22:40)      ALLERGIES:  No Known Allergies    MEDICATIONS:  STANDING MEDICATIONS  aMIOdarone    Tablet 200 milliGRAM(s) Oral daily  aMIOdarone Infusion 1 mG/Min IV Continuous <Continuous>  aMIOdarone Infusion 0.5 mG/Min IV Continuous <Continuous>  apixaban 5 milliGRAM(s) Oral every 12 hours  dapagliflozin 10 milliGRAM(s) Oral every 24 hours  influenza   Vaccine 0.5 milliLiter(s) IntraMuscular once  metoprolol tartrate 25 milliGRAM(s) Oral every 6 hours  pantoprazole    Tablet 40 milliGRAM(s) Oral before breakfast  sodium chloride 0.65% Nasal 1 Spray(s) Both Nostrils two times a day  tamsulosin 0.4 milliGRAM(s) Oral at bedtime    PRN MEDICATIONS  acetaminophen     Tablet .. 650 milliGRAM(s) Oral every 6 hours PRN  melatonin 5 milliGRAM(s) Oral at bedtime PRN        03-13-23 @ 07:01  -  03-14-23 @ 07:00  --------------------------------------------------------  IN: 0 mL / OUT: 500 mL / NET: -500 mL    03-14-23 @ 07:01  -  03-14-23 @ 15:38  --------------------------------------------------------  IN: 0 mL / OUT: 725 mL / NET: -725 mL        LABS:                        18.9   10.27 )-----------( 200      ( 14 Mar 2023 06:21 )             55.3     03-14    138  |  101  |  22<H>  ----------------------------<  103<H>  4.2   |  19  |  1.2    Ca    9.5      14 Mar 2023 06:21  Phos  4.9     03-13  Mg     2.1     03-14    TPro  7.2  /  Alb  3.9  /  TBili  1.1  /  DBili  x   /  AST  52<H>  /  ALT  65<H>  /  AlkPhos  78  03-14    PT/INR - ( 13 Mar 2023 05:07 )   PT: 13.90 sec;   INR: 1.21 ratio         PTT - ( 13 Mar 2023 05:07 )  PTT:41.8 sec    RADIOLOGY:    VITALS:   T(F): 97  HR: 61  BP: 121/68  RR: 18  SpO2: --    PHYSICAL EXAM:    GENERAL: NAD  HEAD:  Atraumatic, Normocephalic  NERVOUS SYSTEM:  Alert & Oriented X3  CHEST/LUNG: Clear to auscultation bilaterally; No rales, rhonchi, wheezing, or rubs  HEART: Regular rate and rhythm; No audible murmurs  ABDOMEN: Soft, Nontender, Nondistended; Bowel sounds present  EXTREMITIES: trace edema  LYMPH: No lymphadenopathy noted  SKIN: No rashes or lesions

## 2023-03-14 NOTE — PROGRESS NOTE ADULT - SUBJECTIVE AND OBJECTIVE BOX
CHIEF COMPLAINT:    Patient is a 62y old  Male who presents with a chief complaint of Afib w/ RVR, epistaxis       INTERVAL HPI/OVERNIGHT EVENTS:    Patient seen and examined at bedside. No acute overnight events occurred.    ROS: All other systems are negative.    Medications:  Standing  aMIOdarone    Tablet 200 milliGRAM(s) Oral daily  aMIOdarone Infusion 1 mG/Min IV Continuous <Continuous>  aMIOdarone Infusion 0.5 mG/Min IV Continuous <Continuous>  apixaban 5 milliGRAM(s) Oral every 12 hours  dapagliflozin 10 milliGRAM(s) Oral every 24 hours  influenza   Vaccine 0.5 milliLiter(s) IntraMuscular once  metoprolol tartrate 25 milliGRAM(s) Oral every 6 hours  pantoprazole    Tablet 40 milliGRAM(s) Oral before breakfast  sodium chloride 0.65% Nasal 1 Spray(s) Both Nostrils two times a day  tamsulosin 0.4 milliGRAM(s) Oral at bedtime    PRN Meds  acetaminophen     Tablet .. 650 milliGRAM(s) Oral every 6 hours PRN  melatonin 5 milliGRAM(s) Oral at bedtime PRN        Vital Signs:    T(F): 96.7 (23 @ 08:00), Max: 97 (23 @ 20:25)  HR: 122 (23 @ 08:00) (78 - 136)  BP: 142/90 (23 @ 08:00) (125/87 - 149/67)  RR: 18 (23 @ 08:00) (18 - 18)  SpO2: --  I&O's Summary    13 Mar 2023 07:01  -  14 Mar 2023 07:00  --------------------------------------------------------  IN: 0 mL / OUT: 500 mL / NET: -500 mL      Daily Height in cm: 175.26 (14 Mar 2023 12:34)    Daily Weight in k.9 (14 Mar 2023 04:00)  CAPILLARY BLOOD GLUCOSE      PHYSICAL EXAM:  GENERAL:  NAD  SKIN: No rashes or lesions  HEENT: Atraumatic. Normocephalic. Anicteric  NECK:  No JVD.   PULMONARY: Clear to ausculation bilaterally. No wheezing. No rales  CVS: Normal S1, S2. Regular rate and rhythm. No murmurs.  ABDOMEN/GI: Soft, Nontender, Nondistended; Bowel sounds are present  EXTREMITIES:  No edema B/L LE.  NEUROLOGIC:  No motor deficit.  PSYCH: Alert & oriented x 3, normal affect      LABS:                        18.9   10.27 )-----------( 200      ( 14 Mar 2023 06:21 )             55.3     03-14    138  |  101  |  22<H>  ----------------------------<  103<H>  4.2   |  19  |  1.2    Ca    9.5      14 Mar 2023 06:21  Phos  4.9     03-13  Mg     2.1     -14    TPro  7.2  /  Alb  3.9  /  TBili  1.1  /  DBili  x   /  AST  52<H>  /  ALT  65<H>  /  AlkPhos  78  03-14    PT/INR - ( 13 Mar 2023 05:07 )   PT: 13.90 sec;   INR: 1.21 ratio         PTT - ( 13 Mar 2023 05:07 )  PTT:41.8 sec          RADIOLOGY & ADDITIONAL TESTS:  Imaging or report Personally Reviewed:  [ ] YES  [ ] NO -->no new images    Telemetry reviewed independently - paroxysmal aflutter  EKG reviewed independently -->no new EKGs    Consultant(s) Notes Reviewed:  [ ] YES  [ ] NO  Care Discussed with Consultants/Other Providers [ ] YES  [ ] NO    Case discussed with resident  Care discussed with pt

## 2023-03-14 NOTE — PROGRESS NOTE ADULT - ASSESSMENT
61 yo M PMHx HTN presented for evaluation of epistaxis and palpitations x1 day. Pt reports that he had a nosebleed the day prior to presentation that had not stopped since then, so he come to ER. He also complained of palpitations and an "anxious feeling" intermittent over the past few months. On further questioning pt reports that he hasn't seen a PCP in over a year, and during that period he has been having orthopnea, exertional dyspnea, and occasional LE swelling.     New-onset  Paroxysmal Afib w/ RVR  Acute HFrEF,likely tachycardia induced cardiomyopathy  - , /97, months of intermittent palpitations, orthopnea, LE swelling, exertional dyspnea  - EKG Afib w/ RVR and RBBB  - CXR mild cardiomegaly but no consolidations/effusions  - s/p cardizem IV and PO   - CHADsVASC 2-3    - pt currently rate controlled  - c/w Eliquis, amio, metoprolol (change to succinate), add losartan  - needs losartan, Farixga, metoprolol succinate upon discharge. Repeat echo as outpt. If EF still low will need ischemic work up  - pt went into afib last night, now intermittently in aflutter. Case d/w EP, recommended continue amio infusion.     Hydronephrosis  - incidentally found on RUQ sono  - urology consulted, recomended CT abdomen and then Lasix scan  - no evidence of definite obstructing lesion or nephrolithiasis is not identified.   - as per urology this work up needs to be inpatient  - last scan suggest right outlet obstruction. As per urology follow up as outpt with Dr. Grewal        HTN  HTN Urgency due to medication non-adherance  - bp now controlled  - hold lasix and losartan (last dose of lasix was 3/11)  - restart losartan after lasix scan    Epistaxis  - TXA given  - resolved  - no episodes while on a/c    PreDM  - a1c 5.7  - diet and exercise modifications    Transaminitis  - likely due to congestion hepatopathy  - RUQ sono unremarkable for hepatobiliary pathology    Macrocytosis  - b12, folate pending      #Progress Note Handoff  Pending (specify):  rate control  Family discussion: I spoke with pt at bedside regarding amio drip  Disposition: Pt will need primary care physician appointment at [facility], home

## 2023-03-14 NOTE — PROGRESS NOTE ADULT - ASSESSMENT
62M with PMHx of HTN presents to the ED for epistaxis and palpitations x1 day. Pt reports that he had a nosebleed yesterday that has not stopped since then, so he presented for evaluation. He also complains of palpitations and an "anxious feeling" intermittent over the past few months. On further questioning pt reports that he hasn't seen a PCP in over a year, and during that period he has been having orthopnea, exertional dyspnea, and occasional LE swelling.     #New-onset  Paroxysmal Afib w/ RVR - reverted   #acute HFrEF  - , /97, months of intermittent palpitations, orthopnea, LE swelling, exertional dyspnea  - EKG Afib w/ RVR and RBBB  - CXR mild cardiomegaly but no consolidations/effusions  - s/p cardizem IV and PO   - CHADsVASC 2-3  - ECHO: 35 to 40 EF  - TSH within normal limits   - a1c 5.7  - trop neg x3   - Cardio onboard; Will need repeat TTE in 1 month. If no improvement in EF then ischemic workup can be considered   - c/w Eliquis, Dapagliflozin, metoprolol, amiodarone, off ACE/ARB (consider resuming if BP allows), Lasix held due elevated Cr  - Went into SVT/a-flutter currently on amiodrip will switch to oral amio 200mg BID when loading ends    #HTN  #HTN Urgency due to medication non-compliance  #Epistaxis  - Presented with /97 and mild epistaxis   - BP improved to 135/91 in ED  - s/p TXA for epistaxis  - metoprolol tartrate 12.5 mg BID    #Prediabetic  - a1C 5.7  - c/w Dapagliflozin    #Right renal hydro  - CTAP noncontrast; no obstruction  - Mag3 renal scan done and reviewed by urology, Outpatient f/u with Urology    #suspected SANJU  - Sleep study outpatient    #Transaminitis  - likely 2/2 hepatic congestion  - Resolving    #macrocytosis  - f/u b12 and folate, pt has drinking hx

## 2023-03-14 NOTE — SBIRT NOTE ADULT - NSSBIRTALCPOSREINDET_GEN_A_CORE
Pt. conveyed awareness and insight into current alcohol use and understands the impact that alcohol-use can have on health and overall functioning.. SW encouraged pt to remain aware of moderate alcohol use to sustain low risk of negative health complications from drinking .   No intervention required at this time.

## 2023-03-14 NOTE — PROGRESS NOTE ADULT - SUBJECTIVE AND OBJECTIVE BOX
Patient is a 62y old  Male who presents with a chief complaint of Afib w/ RVR, epistaxis (13 Mar 2023 17:59)    HPI:  62M with PMHx of HTN presents to the ED for epistaxis and palpitations x1 day. Pt reports that he had a nosebleed yesterday that has not stopped since then, so he presented for evaluation. He also complains of palpitations and an "anxious feeling" intermittent over the past few months. On further questioning pt reports that he hasn't seen a PCP in over a year, and during that period he has been having orthopnea, exertional dyspnea, and occasional LE swelling. He was on HTN meds but ran out (and was titrating them) a month ago and has been using his girlfriend metoprolol 25mg qd since then. He denies any CP, abd pain, N/V/C/D, recent travel or sick contacts, or any  symptoms.    In the ED: /97, , T 98F, RR 20 satting 99% on RA. Labs notable for AG 20 and mildly elevated AST/ALT but otherwise unremarkable. EKG showed Afib w/ RVR and RBBB. CXR mild cardiomegaly with no consolidations.   Pt received TXA and IV cardizem then given PO. Admitted to telemetry.  (09 Mar 2023 22:40)      SUBJ:  Patient seen and examined. Was in NSR yesterday, but now in A. flutter with 2:1 block. HR is 120's on Amiodarone.      MEDICATIONS  (STANDING):  aMIOdarone    Tablet 200 milliGRAM(s) Oral daily  aMIOdarone Infusion 1 mG/Min (33.3 mL/Hr) IV Continuous <Continuous>  aMIOdarone Infusion 0.5 mG/Min (16.7 mL/Hr) IV Continuous <Continuous>  apixaban 5 milliGRAM(s) Oral every 12 hours  dapagliflozin 10 milliGRAM(s) Oral every 24 hours  influenza   Vaccine 0.5 milliLiter(s) IntraMuscular once  metoprolol tartrate 25 milliGRAM(s) Oral every 6 hours  pantoprazole    Tablet 40 milliGRAM(s) Oral before breakfast  sodium chloride 0.65% Nasal 1 Spray(s) Both Nostrils two times a day  tamsulosin 0.4 milliGRAM(s) Oral at bedtime    MEDICATIONS  (PRN):  acetaminophen     Tablet .. 650 milliGRAM(s) Oral every 6 hours PRN Temp greater or equal to 38C (100.4F), Mild Pain (1 - 3)  melatonin 5 milliGRAM(s) Oral at bedtime PRN Insomnia            Vital Signs Last 24 Hrs  T(C): 35.9 (14 Mar 2023 08:00), Max: 36.7 (13 Mar 2023 12:43)  T(F): 96.7 (14 Mar 2023 08:00), Max: 98 (13 Mar 2023 12:43)  HR: 122 (14 Mar 2023 08:00) (70 - 136)  BP: 142/90 (14 Mar 2023 08:00) (125/87 - 177/77)  BP(mean): --  RR: 18 (14 Mar 2023 08:00) (17 - 18)  SpO2: --          PHYSICAL EXAM:    GEN: AAO x 3, NAD  HEENT: NC/AT, PERRL  Neck: No JVD, no bruits  CV: Reg, S1-S2, no murmur  Lungs: CTAB  Abd: Soft, non-tender  Ext: No edema        03-13-23 @ 07:01  -  03-14-23 @ 07:00  --------------------------------------------------------  IN: 0 mL / OUT: 500 mL / NET: -500 mL        I&O's Summary    13 Mar 2023 07:01  -  14 Mar 2023 07:00  --------------------------------------------------------  IN: 0 mL / OUT: 500 mL / NET: -500 mL    	    TELEMETRY:    ECG:    TTE:  < from: TTE Echo Complete w/ Contrast w/ Doppler (03.10.23 @ 16:49) >    Summary:   1. Moderately decreased global left ventricular systolic function with   mild concentric left ventricular hypertrophy.. Left ventricular ejection   fraction, by visual estimation, is 35 to 40%.   2. Endocardial visualization was enhancedwith intravenous echo   contrast. There is no evidence of LV thrombus.   3. Diastolic function could not be assessed in this study due to atrial   arrhythmia.   4. Normal right ventricular size with moderately reduced systolic   function.   5. Mildlyenlarged left atrium.   6. No hemodynamically significant valvular disease.   7. Mild pulmonary hypertension (PASP = 42mmHg).   8. No pericardial effusion.   9. NB: patient was in rapid atrial fibrillation during this exam.      < end of copied text >      LABS:                        18.9   10.27 )-----------( 200      ( 14 Mar 2023 06:21 )             55.3     03-14    138  |  101  |  22<H>  ----------------------------<  103<H>  4.2   |  19  |  1.2    Ca    9.5      14 Mar 2023 06:21  Phos  4.9     03-13  Mg     2.1     03-14    TPro  7.2  /  Alb  3.9  /  TBili  1.1  /  DBili  x   /  AST  52<H>  /  ALT  65<H>  /  AlkPhos  78  03-14        PT/INR - ( 13 Mar 2023 05:07 )   PT: 13.90 sec;   INR: 1.21 ratio         PTT - ( 13 Mar 2023 05:07 )  PTT:41.8 sec      BNP  RADIOLOGY & ADDITIONAL STUDIES:      IMPRESSION AND PLAN:

## 2023-03-14 NOTE — PROGRESS NOTE ADULT - ASSESSMENT
Recurrent AF/flutter  Cardiomyopathy - likely non-ischemic (suspect tachy induced)    Recommend    C/w IV Amio  EP follow-up today to make recommendations for ablation versus repeat DCCV (although seems to be in paroxysmal fib/flutter, so less likely to stay in NSR if cardioverted)  Increase metoprolol to 50 mg  C/w Eliquis  Outpatient repeat echo, ischemic w/u once HR is under control.

## 2023-03-14 NOTE — CHART NOTE - NSCHARTNOTEFT_GEN_A_CORE
62y old Male with a pmh of HTN admitted for afib w/ RVR and epistaxis -  now found to have moderate right hydronephrosis on Abdominal US.  consulted for further evaluation. Patient with found with UPJ obstruction.                           18.9   10.27 )-----------( 200      ( 14 Mar 2023 06:21 )             55.3   03-14    138  |  101  |  22<H>  ----------------------------<  103<H>  4.2   |  19  |  1.2    Ca    9.5      14 Mar 2023 06:21  Phos  4.9     03-13  Mg     2.1     03-14    TPro  7.2  /  Alb  3.9  /  TBili  1.1  /  DBili  x   /  AST  52<H>  /  ALT  65<H>  /  AlkPhos  78  03-14      < from: NM Renal/Lasix (NM Renal/Lasix .) (03.13.23 @ 17:25) >    ACC: [id number] EXAM:  NM KIDNEY IMG  LASIX   ORDERED BY: ZORAIDA COOK     PROCEDURE DATE:  03/13/2023      INTERPRETATION:  PROCEDURE: RENAL FLOW; RENAL IMAGES; LASIX RENOGRAPHY;   COMPUTER ANALYSIS    REASON: Right hydronephrosis    BUN 25; creatinine 1.2    Comparison: Correlation with CT abdomen and pelvis 3/12/2023    Following the intravenous bolus injection of 8 mCi 99m technetium MAG-3,   posterior kidney images were obtained at 2-second intervals as an   angiographic acquisition.  These images reveal normal symmetric renal perfusion compared to aortic   arterial flow.  Then posterior kidney images were obtained at 1-minute intervals for the   next 60 minutes. Postvoiding posterior view of the kidney and bladder   region was obtained at 10 minutes post injection. 40 mg Lasix was   injected intravenously at 11 minutes post-injection.  These images reveal normal symmetric MAG3 uptake by both kidneys with   normal cortex to collecting system time, however there is abnormal holdup   of radiotracer consistent with obstruction  Cortex to collecting system transit time 6 minutes.  Ureters: diminished visualization of the ureters bilaterally.  Delayed view: Accumulation of radiotracer in the right renal calyceal   system and pelvis.  Computer analysis of the 2-3 minute post injection image was performed.    These reveal that the right kidney accounts for 56 %, and the left kidney   accounts for 44  % of total renal function    The renogram curve for the right kidney: Upsloping morphology prior to   Lasix injection, after Lasix injection there is a nearly flat downsloping   curve.  The renogram curve for the left kidney: shows an up sloping morphology   prior to Lasix injection, post-Lasix injection there is a downsloping   morphology to the curve.    Post-lasix T-1/2 for the right kidney is 115 minutes (normal < 20 minutes)  Post-lasix T-1/2 for the left kidney is 22 minutes (normal < 20 minutes)      IMPRESSION:  1. There is normal perfusion and cortex to collecting system transit time   in the bilateral kidneys.  2. Abnormal holdup of radiotracer in the bilateral collecting systems   with minimal visualization of the ureters, markedly greater in the right   kidney consistent with obstruction at the ureteropelvicjunction.  3. Post-Lasix T1 half of emptying is 115 minutes on the right, and 22   minutes on the left. (Normal < 20 minutes)    --- End of Report ---    ABRAHAM LAWSON MD; Resident Radiologist  This document has been electronically signed.  JASIEL TOVAR MD; Attending Radiologist  This document has been electronically signed. Mar 14 2023 10:13AM    < end of copied text >    Plan:   - Patient will need to f/u OP with Urology at [address] [place] [address] for UPJ obstruction with Dr. Grewal for further management  - Continue to Trend Cr, Cr 1,2 (remains stable, trending down)  - No Further acute  intervention at this time   - Recall prn, if patient is has worsening flank pain or DYLAN   - spoke with medical team 62y old Male with a pmh of HTN admitted for afib w/ RVR and epistaxis -  now found to have moderate right hydronephrosis on Abdominal US.  consulted for further evaluation. Patient with found with UPJ obstruction.                           18.9   10.27 )-----------( 200      ( 14 Mar 2023 06:21 )             55.3   03-14    138  |  101  |  22<H>  ----------------------------<  103<H>  4.2   |  19  |  1.2    Ca    9.5      14 Mar 2023 06:21  Phos  4.9     03-13  Mg     2.1     03-14    TPro  7.2  /  Alb  3.9  /  TBili  1.1  /  DBili  x   /  AST  52<H>  /  ALT  65<H>  /  AlkPhos  78  03-14      < from: NM Renal/Lasix (NM Renal/Lasix .) (03.13.23 @ 17:25) >    ACC: [id number] EXAM:  NM KIDNEY IMG  LASIX   ORDERED BY: ZORAIDA COOK     PROCEDURE DATE:  03/13/2023      INTERPRETATION:  PROCEDURE: RENAL FLOW; RENAL IMAGES; LASIX RENOGRAPHY;   COMPUTER ANALYSIS    REASON: Right hydronephrosis    BUN 25; creatinine 1.2    Comparison: Correlation with CT abdomen and pelvis 3/12/2023    Following the intravenous bolus injection of 8 mCi 99m technetium MAG-3,   posterior kidney images were obtained at 2-second intervals as an   angiographic acquisition.  These images reveal normal symmetric renal perfusion compared to aortic   arterial flow.  Then posterior kidney images were obtained at 1-minute intervals for the   next 60 minutes. Postvoiding posterior view of the kidney and bladder   region was obtained at 10 minutes post injection. 40 mg Lasix was   injected intravenously at 11 minutes post-injection.  These images reveal normal symmetric MAG3 uptake by both kidneys with   normal cortex to collecting system time, however there is abnormal holdup   of radiotracer consistent with obstruction  Cortex to collecting system transit time 6 minutes.  Ureters: diminished visualization of the ureters bilaterally.  Delayed view: Accumulation of radiotracer in the right renal calyceal   system and pelvis.  Computer analysis of the 2-3 minute post injection image was performed.    These reveal that the right kidney accounts for 56 %, and the left kidney   accounts for 44  % of total renal function    The renogram curve for the right kidney: Upsloping morphology prior to   Lasix injection, after Lasix injection there is a nearly flat downsloping   curve.  The renogram curve for the left kidney: shows an up sloping morphology   prior to Lasix injection, post-Lasix injection there is a downsloping   morphology to the curve.    Post-lasix T-1/2 for the right kidney is 115 minutes (normal < 20 minutes)  Post-lasix T-1/2 for the left kidney is 22 minutes (normal < 20 minutes)      IMPRESSION:  1. There is normal perfusion and cortex to collecting system transit time   in the bilateral kidneys.  2. Abnormal holdup of radiotracer in the bilateral collecting systems   with minimal visualization of the ureters, markedly greater in the right   kidney consistent with obstruction at the ureteropelvicjunction.  3. Post-Lasix T1 half of emptying is 115 minutes on the right, and 22   minutes on the left. (Normal < 20 minutes)    --- End of Report ---    ABRAHAM LAWSON MD; Resident Radiologist  This document has been electronically signed.  JASIEL TOVAR MD; Attending Radiologist  This document has been electronically signed. Mar 14 2023 10:13AM    < end of copied text >    Plan:   - Patient will need to f/u OP with Urology at [address] [place] [address] for UPJ obstruction with Dr. Grewal for further management  - Continue to Trend Cr, Cr 1,2 (remains stable, trending down)  - No Further acute  intervention at this time   - Recall prn, if patient is has worsening flank pain or DYLAN   - spoke with medical team    Dr Gloria:  Agree with above.  CT scan chest 2015 also showed similar right hydronephrosis.

## 2023-03-15 LAB
ALBUMIN SERPL ELPH-MCNC: 3.6 G/DL — SIGNIFICANT CHANGE UP (ref 3.5–5.2)
ALP SERPL-CCNC: 72 U/L — SIGNIFICANT CHANGE UP (ref 30–115)
ALT FLD-CCNC: 54 U/L — HIGH (ref 0–41)
ANION GAP SERPL CALC-SCNC: 14 MMOL/L — SIGNIFICANT CHANGE UP (ref 7–14)
AST SERPL-CCNC: 35 U/L — SIGNIFICANT CHANGE UP (ref 0–41)
BASOPHILS # BLD AUTO: 0.03 K/UL — SIGNIFICANT CHANGE UP (ref 0–0.2)
BASOPHILS NFR BLD AUTO: 0.3 % — SIGNIFICANT CHANGE UP (ref 0–1)
BILIRUB SERPL-MCNC: 0.9 MG/DL — SIGNIFICANT CHANGE UP (ref 0.2–1.2)
BUN SERPL-MCNC: 21 MG/DL — HIGH (ref 10–20)
CALCIUM SERPL-MCNC: 9.1 MG/DL — SIGNIFICANT CHANGE UP (ref 8.4–10.4)
CHLORIDE SERPL-SCNC: 104 MMOL/L — SIGNIFICANT CHANGE UP (ref 98–110)
CO2 SERPL-SCNC: 21 MMOL/L — SIGNIFICANT CHANGE UP (ref 17–32)
CREAT SERPL-MCNC: 1.2 MG/DL — SIGNIFICANT CHANGE UP (ref 0.7–1.5)
EGFR: 69 ML/MIN/1.73M2 — SIGNIFICANT CHANGE UP
EOSINOPHIL # BLD AUTO: 0.13 K/UL — SIGNIFICANT CHANGE UP (ref 0–0.7)
EOSINOPHIL NFR BLD AUTO: 1.3 % — SIGNIFICANT CHANGE UP (ref 0–8)
GLUCOSE SERPL-MCNC: 99 MG/DL — SIGNIFICANT CHANGE UP (ref 70–99)
HCT VFR BLD CALC: 51.7 % — SIGNIFICANT CHANGE UP (ref 42–52)
HGB BLD-MCNC: 17.6 G/DL — SIGNIFICANT CHANGE UP (ref 14–18)
IMM GRANULOCYTES NFR BLD AUTO: 0.4 % — HIGH (ref 0.1–0.3)
LYMPHOCYTES # BLD AUTO: 2.01 K/UL — SIGNIFICANT CHANGE UP (ref 1.2–3.4)
LYMPHOCYTES # BLD AUTO: 20.4 % — LOW (ref 20.5–51.1)
MAGNESIUM SERPL-MCNC: 2 MG/DL — SIGNIFICANT CHANGE UP (ref 1.8–2.4)
MCHC RBC-ENTMCNC: 34 G/DL — SIGNIFICANT CHANGE UP (ref 32–37)
MCHC RBC-ENTMCNC: 34.3 PG — HIGH (ref 27–31)
MCV RBC AUTO: 100.8 FL — HIGH (ref 80–94)
MONOCYTES # BLD AUTO: 1.13 K/UL — HIGH (ref 0.1–0.6)
MONOCYTES NFR BLD AUTO: 11.4 % — HIGH (ref 1.7–9.3)
NEUTROPHILS # BLD AUTO: 6.53 K/UL — HIGH (ref 1.4–6.5)
NEUTROPHILS NFR BLD AUTO: 66.2 % — SIGNIFICANT CHANGE UP (ref 42.2–75.2)
NRBC # BLD: 0 /100 WBCS — SIGNIFICANT CHANGE UP (ref 0–0)
PLATELET # BLD AUTO: 204 K/UL — SIGNIFICANT CHANGE UP (ref 130–400)
POTASSIUM SERPL-MCNC: 3.8 MMOL/L — SIGNIFICANT CHANGE UP (ref 3.5–5)
POTASSIUM SERPL-SCNC: 3.8 MMOL/L — SIGNIFICANT CHANGE UP (ref 3.5–5)
PROT SERPL-MCNC: 6.6 G/DL — SIGNIFICANT CHANGE UP (ref 6–8)
RBC # BLD: 5.13 M/UL — SIGNIFICANT CHANGE UP (ref 4.7–6.1)
RBC # FLD: 13.6 % — SIGNIFICANT CHANGE UP (ref 11.5–14.5)
SODIUM SERPL-SCNC: 139 MMOL/L — SIGNIFICANT CHANGE UP (ref 135–146)
WBC # BLD: 9.87 K/UL — SIGNIFICANT CHANGE UP (ref 4.8–10.8)
WBC # FLD AUTO: 9.87 K/UL — SIGNIFICANT CHANGE UP (ref 4.8–10.8)

## 2023-03-15 PROCEDURE — 99233 SBSQ HOSP IP/OBS HIGH 50: CPT

## 2023-03-15 RX ORDER — LOSARTAN POTASSIUM 100 MG/1
25 TABLET, FILM COATED ORAL DAILY
Refills: 0 | Status: DISCONTINUED | OUTPATIENT
Start: 2023-03-15 | End: 2023-03-16

## 2023-03-15 RX ORDER — ZOLPIDEM TARTRATE 10 MG/1
5 TABLET ORAL AT BEDTIME
Refills: 0 | Status: DISCONTINUED | OUTPATIENT
Start: 2023-03-15 | End: 2023-03-16

## 2023-03-15 RX ADMIN — AMIODARONE HYDROCHLORIDE 200 MILLIGRAM(S): 400 TABLET ORAL at 17:30

## 2023-03-15 RX ADMIN — APIXABAN 5 MILLIGRAM(S): 2.5 TABLET, FILM COATED ORAL at 10:21

## 2023-03-15 RX ADMIN — LOSARTAN POTASSIUM 25 MILLIGRAM(S): 100 TABLET, FILM COATED ORAL at 11:22

## 2023-03-15 RX ADMIN — APIXABAN 5 MILLIGRAM(S): 2.5 TABLET, FILM COATED ORAL at 22:16

## 2023-03-15 RX ADMIN — Medication 25 MILLIGRAM(S): at 06:35

## 2023-03-15 RX ADMIN — Medication 25 MILLIGRAM(S): at 17:30

## 2023-03-15 RX ADMIN — Medication 1 SPRAY(S): at 17:31

## 2023-03-15 RX ADMIN — PANTOPRAZOLE SODIUM 40 MILLIGRAM(S): 20 TABLET, DELAYED RELEASE ORAL at 06:35

## 2023-03-15 RX ADMIN — AMIODARONE HYDROCHLORIDE 200 MILLIGRAM(S): 400 TABLET ORAL at 06:35

## 2023-03-15 RX ADMIN — DAPAGLIFLOZIN 10 MILLIGRAM(S): 10 TABLET, FILM COATED ORAL at 11:20

## 2023-03-15 RX ADMIN — ZOLPIDEM TARTRATE 5 MILLIGRAM(S): 10 TABLET ORAL at 22:18

## 2023-03-15 RX ADMIN — Medication 25 MILLIGRAM(S): at 23:04

## 2023-03-15 RX ADMIN — Medication 1 SPRAY(S): at 06:36

## 2023-03-15 RX ADMIN — Medication 5 MILLIGRAM(S): at 22:39

## 2023-03-15 RX ADMIN — Medication 25 MILLIGRAM(S): at 11:21

## 2023-03-15 RX ADMIN — Medication 25 MILLIGRAM(S): at 00:18

## 2023-03-15 RX ADMIN — TAMSULOSIN HYDROCHLORIDE 0.4 MILLIGRAM(S): 0.4 CAPSULE ORAL at 22:17

## 2023-03-15 NOTE — PROGRESS NOTE ADULT - SUBJECTIVE AND OBJECTIVE BOX
INTERVAL HPI/OVERNIGHT EVENTS: Patient converted back to AF RVR overnight on Amiodarone PO, feeling fatigued with mild palpitations    MEDICATIONS  (STANDING):  aMIOdarone    Tablet 200 milliGRAM(s) Oral two times a day  aMIOdarone Infusion 1 mG/Min (33.3 mL/Hr) IV Continuous <Continuous>  aMIOdarone Infusion 0.5 mG/Min (16.7 mL/Hr) IV Continuous <Continuous>  apixaban 5 milliGRAM(s) Oral every 12 hours  dapagliflozin 10 milliGRAM(s) Oral every 24 hours  influenza   Vaccine 0.5 milliLiter(s) IntraMuscular once  losartan 25 milliGRAM(s) Oral daily  metoprolol tartrate 25 milliGRAM(s) Oral every 6 hours  pantoprazole    Tablet 40 milliGRAM(s) Oral before breakfast  sodium chloride 0.65% Nasal 1 Spray(s) Both Nostrils two times a day  tamsulosin 0.4 milliGRAM(s) Oral at bedtime  zolpidem 5 milliGRAM(s) Oral at bedtime    MEDICATIONS  (PRN):  acetaminophen     Tablet .. 650 milliGRAM(s) Oral every 6 hours PRN Temp greater or equal to 38C (100.4F), Mild Pain (1 - 3)  melatonin 5 milliGRAM(s) Oral at bedtime PRN Insomnia      Allergies  No Known Allergies    Intolerances        REVIEW OF SYSTEMS: +Palpitations, fatigue. No CP, SOB or syncope    Vital Signs Last 24 Hrs  T(C): 36.4 (15 Mar 2023 13:36), Max: 36.6 (14 Mar 2023 18:00)  T(F): 97.6 (15 Mar 2023 13:36), Max: 97.8 (14 Mar 2023 18:00)  HR: 57 (15 Mar 2023 13:36) (57 - 119)  BP: 124/63 (15 Mar 2023 13:36) (118/56 - 138/81)  BP(mean): --  RR: 18 (15 Mar 2023 13:36) (18 - 18)  SpO2: --        03-14-23 @ 07:01  -  03-15-23 @ 07:00  --------------------------------------------------------  IN: 221 mL / OUT: 1100 mL / NET: -879 mL    03-15-23 @ 07:01  -  03-15-23 @ 13:59  --------------------------------------------------------  IN: 692 mL / OUT: 500 mL / NET: 192 mL        Physical Exam  GENERAL: In no apparent distress, well nourished, and hydrated.  EYES: EOMI, PERRLA, conjunctiva and sclera clear  NECK: Supple  HEART: Irregular rate and rhythm; No murmurs, rubs, or gallops.  PULMONARY: Clear to auscultation and perfusion.  No rales, wheezing, or rhonchi bilaterally.  EXTREMITIES:  2+ Peripheral Pulses, No clubbing, cyanosis, or edema  NEUROLOGICAL: Grossly nonfocal    LABS:                        17.6   9.87  )-----------( 204      ( 15 Mar 2023 06:38 )             51.7     03-15    139  |  104  |  21<H>  ----------------------------<  99  3.8   |  21  |  1.2    Ca    9.1      15 Mar 2023 06:38  Mg     2.0     03-15    TPro  6.6  /  Alb  3.6  /  TBili  0.9  /  DBili  x   /  AST  35  /  ALT  54<H>  /  AlkPhos  72  03-15          RADIOLOGY & ADDITIONAL TESTS:

## 2023-03-15 NOTE — PROGRESS NOTE ADULT - SUBJECTIVE AND OBJECTIVE BOX
BEHAN, JAMES  62y  Male      Patient is a 62y old  Male who presents with a chief complaint of Afib w/ RVR, epistaxis (15 Mar 2023 13:59)      INTERVAL HPI/OVERNIGHT EVENTS:  He feels ok, no palpitation, no chest pain.   Vital Signs Last 24 Hrs  T(C): 36.4 (15 Mar 2023 13:36), Max: 36.6 (14 Mar 2023 18:00)  T(F): 97.6 (15 Mar 2023 13:36), Max: 97.8 (14 Mar 2023 18:00)  HR: 57 (15 Mar 2023 13:36) (57 - 119)  BP: 124/63 (15 Mar 2023 13:36) (118/56 - 138/81)  BP(mean): --  RR: 18 (15 Mar 2023 13:36) (18 - 18)  SpO2: --          03-14-23 @ 07:01  -  03-15-23 @ 07:00  --------------------------------------------------------  IN: 221 mL / OUT: 1100 mL / NET: -879 mL    03-15-23 @ 07:01  -  03-15-23 @ 15:43  --------------------------------------------------------  IN: 692 mL / OUT: 500 mL / NET: 192 mL            Consultant(s) Notes Reviewed:  [x ] YES  [ ] NO          MEDICATIONS  (STANDING):  aMIOdarone    Tablet 200 milliGRAM(s) Oral two times a day  aMIOdarone Infusion 1 mG/Min (33.3 mL/Hr) IV Continuous <Continuous>  aMIOdarone Infusion 0.5 mG/Min (16.7 mL/Hr) IV Continuous <Continuous>  apixaban 5 milliGRAM(s) Oral every 12 hours  dapagliflozin 10 milliGRAM(s) Oral every 24 hours  influenza   Vaccine 0.5 milliLiter(s) IntraMuscular once  losartan 25 milliGRAM(s) Oral daily  metoprolol tartrate 25 milliGRAM(s) Oral every 6 hours  pantoprazole    Tablet 40 milliGRAM(s) Oral before breakfast  sodium chloride 0.65% Nasal 1 Spray(s) Both Nostrils two times a day  tamsulosin 0.4 milliGRAM(s) Oral at bedtime  zolpidem 5 milliGRAM(s) Oral at bedtime    MEDICATIONS  (PRN):  acetaminophen     Tablet .. 650 milliGRAM(s) Oral every 6 hours PRN Temp greater or equal to 38C (100.4F), Mild Pain (1 - 3)  melatonin 5 milliGRAM(s) Oral at bedtime PRN Insomnia      LABS                          17.6   9.87  )-----------( 204      ( 15 Mar 2023 06:38 )             51.7     03-15    139  |  104  |  21<H>  ----------------------------<  99  3.8   |  21  |  1.2    Ca    9.1      15 Mar 2023 06:38  Mg     2.0     03-15    TPro  6.6  /  Alb  3.6  /  TBili  0.9  /  DBili  x   /  AST  35  /  ALT  54<H>  /  AlkPhos  72  03-15          Lactate Trend        CAPILLARY BLOOD GLUCOSE            RADIOLOGY & ADDITIONAL TESTS:    Imaging Personally Reviewed:  [ ] YES  [ ] NO    HEALTH ISSUES - PROBLEM Dx:          PHYSICAL EXAM:  GENERAL: NAD, well-developed.  HEAD:  Atraumatic, Normocephalic.  EYES: EOMI, PERRLA, conjunctiva and sclera clear.  NECK: Supple, No JVD.  CHEST/LUNG: Clear to auscultation bilaterally; No wheeze.  HEART: Irregular rate and rhythm; S1 S2.   ABDOMEN: Soft, Nontender, Nondistended; Bowel sounds present.  EXTREMITIES:  2+ Peripheral Pulses, No clubbing, cyanosis, or edema.  PSYCH: AAOx3.  NEUROLOGY: non-focal.  SKIN: No rashes or lesions.

## 2023-03-15 NOTE — PROGRESS NOTE ADULT - ASSESSMENT
61 yo M PMHx HTN presented for evaluation of epistaxis and palpitations x1 day. Pt reports that he had a nosebleed the day prior to presentation that had not stopped since then, so he come to ER. He also complained of palpitations and an "anxious feeling" intermittent over the past few months. On further questioning pt reports that he hasn't seen a PCP in over a year, and during that period he has been having orthopnea, exertional dyspnea, and occasional LE swelling.     New-onset  Paroxysmal Atrial flutter with Rapid Ventricular Response:   Back to Aflutter.   s/p amiodarone drip, continue Amiodarone PO 200mg BID. Continue Eliquis.   s/p GAIBNO and cardioversion 3/10 two shock failed.   EP follow up    Acute HFrEF ,likely tachycardia induced cardiomyopathy  Patient with orthopnea, LE swelling, exertional dyspnea  CXR mild cardiomegaly but no consolidations/effusions  Echo showed   Euvolemic  Continue Metoprolol, Losartan and Farxiga.   Cardiology follow up.     Hydronephrosis  - incidentally found on RUQ sono  urology consulted, recomended CT abdomen and then Lasix scan  no evidence of definite obstructing lesion or nephrolithiasis is not identified.   urology follow up as outpt with Dr. Grewal      HTN  HTN Urgency due to medication non-adherance   bp now controlled  Continue Losartan and Metoprolol.       PreDM  - a1c 5.7  - diet and exercise modifications    Transaminitis  - likely due to congestion hepatopathy  - RUQ sono unremarkable for hepatobiliary pathology    Macrocytosis  - b12, folate pending    #Progress Note Handoff  Pending (specify):  rate control  Family discussion: I spoke with pt at bedside regarding amio drip  Disposition: Pt will need primary care physician appointment at [facility] clinic, home

## 2023-03-15 NOTE — PROGRESS NOTE ADULT - ASSESSMENT
62M with PMHx of HTN presents to the ED for epistaxis and palpitations x1 day. Pt reports that he had a nosebleed yesterday that has not stopped since then, so he presented for evaluation. He also complains of palpitations and an "anxious feeling" intermittent over the past few months. On further questioning pt reports that he hasn't seen a PCP in over a year, and during that period he has been having orthopnea, exertional dyspnea, and occasional LE swelling.     #New-onset  Paroxysmal Afib w/ RVR - reverted   #acute HFrEF  - , /97, months of intermittent palpitations, orthopnea, LE swelling, exertional dyspnea  - EKG Afib w/ RVR and RBBB  - CXR mild cardiomegaly but no consolidations/effusions  - s/p cardizem IV and PO   - CHADsVASC 2-3  - ECHO: 35 to 40 EF  - TSH within normal limits   - a1c 5.7  - trop neg x3   - Cardio onboard; Will need repeat TTE in 1 month. If no improvement in EF then ischemic workup can be considered   - c/w Eliquis, Dapagliflozin, metoprolol, amiodarone, started losartan, Lasix held due elevated Cr  - Went into SVT/a-flutter s/p amiodarone drip, on amiodarone 200mg BID  - metoprolol tartrate 25 mg Q6  - Pending EP eval    #HTN  #HTN Urgency due to medication non-compliance  #Epistaxis  - Presented with /97 and mild epistaxis   - BP improved to 135/91 in ED  - s/p TXA for epistaxis  - metoprolol tartrate 25 mg Q6    #Prediabetic  - a1C 5.7  - c/w Dapagliflozin    #Right renal hydro  - CTAP noncontrast; no obstruction  - Mag3 renal scan done and reviewed by urology, Outpatient f/u with Urology    #suspected SANJU  - Sleep study outpatient    #Transaminitis  - likely 2/2 hepatic congestion  - Resolving    #macrocytosis  - f/u b12 and folate, pt has drinking hx

## 2023-03-15 NOTE — PROGRESS NOTE ADULT - ASSESSMENT
Assessment: 63 yo M with hx of HTN p/w epistaxis and palpitations with SOB. Admits to VAZQUEZ/LE edema over a year. Found to be in new onset AF RVR. Patient cardioverted x 2 unsuccessfully and then converted to NSR on Amio. Now back in AF/AFL RVR.     Impression:  New Onset AF RVR  CHADSVASC = 1  Acute HF  Mod R Hydronephrosis  HTN    Plan:  - Will discuss with attending Assessment: 63 yo M with hx of HTN p/w epistaxis and palpitations with SOB. Admits to VAZQUEZ/LE edema over a year. Found to be in new onset AF RVR. Patient cardioverted x 2 unsuccessfully and then converted to NSR on Amio. Now back in AF/AFL RVR.     Impression:  New Onset AF RVR  CHADSVASC = 1  Acute HF  Mod R Hydronephrosis  HTN    Plan:  - Continue Amiodarone 200mg BID  - Continue Metoprolol, can increase for better rate control if BP tolerates  - Daily EKG  - Cont tele monitoring  - Monitor electrolytes, maintain WNL  - Will discuss further plan with attending Assessment: 61 yo M with hx of HTN p/w epistaxis and palpitations with SOB. Admits to VAZQUEZ/LE edema over a year. Found to be in new onset AF RVR. Patient cardioverted x 2 unsuccessfully and then converted to NSR on Amio. Now back in AF/AFL RVR.     Impression:  New Onset AF RVR  CHADSVASC = 1  Acute HF  Mod R Hydronephrosis  HTN    Plan:  - Continue Amiodarone 200mg BID  - Continue Metoprolol, can increase for better rate control if BP tolerates  - Cont Eliquis for stroke prevention  - Daily EKG  - Cont tele monitoring  - Monitor electrolytes, maintain WNL  - Will discuss further plan with attending

## 2023-03-16 ENCOUNTER — TRANSCRIPTION ENCOUNTER (OUTPATIENT)
Age: 63
End: 2023-03-16

## 2023-03-16 VITALS
TEMPERATURE: 97 F | RESPIRATION RATE: 20 BRPM | OXYGEN SATURATION: 95 % | DIASTOLIC BLOOD PRESSURE: 67 MMHG | SYSTOLIC BLOOD PRESSURE: 125 MMHG | HEART RATE: 61 BPM

## 2023-03-16 LAB
FOLATE SERPL-MCNC: >20 NG/ML — SIGNIFICANT CHANGE UP
VIT B12 SERPL-MCNC: 1426 PG/ML — HIGH (ref 232–1245)

## 2023-03-16 PROCEDURE — 93010 ELECTROCARDIOGRAM REPORT: CPT

## 2023-03-16 PROCEDURE — 99239 HOSP IP/OBS DSCHRG MGMT >30: CPT

## 2023-03-16 RX ORDER — METOPROLOL TARTRATE 50 MG
1 TABLET ORAL
Qty: 0 | Refills: 0 | DISCHARGE

## 2023-03-16 RX ORDER — AMIODARONE HYDROCHLORIDE 400 MG/1
1 TABLET ORAL
Qty: 80 | Refills: 0
Start: 2023-03-16 | End: 2023-04-24

## 2023-03-16 RX ORDER — LOSARTAN POTASSIUM 100 MG/1
1 TABLET, FILM COATED ORAL
Qty: 30 | Refills: 0
Start: 2023-03-16 | End: 2023-04-14

## 2023-03-16 RX ORDER — DAPAGLIFLOZIN 10 MG/1
1 TABLET, FILM COATED ORAL
Qty: 30 | Refills: 0
Start: 2023-03-16 | End: 2023-04-14

## 2023-03-16 RX ORDER — FUROSEMIDE 40 MG
1 TABLET ORAL
Qty: 15 | Refills: 0
Start: 2023-03-16 | End: 2023-04-14

## 2023-03-16 RX ORDER — ZOLPIDEM TARTRATE 10 MG/1
1 TABLET ORAL
Qty: 6 | Refills: 0
Start: 2023-03-16 | End: 2023-03-21

## 2023-03-16 RX ORDER — TAMSULOSIN HYDROCHLORIDE 0.4 MG/1
1 CAPSULE ORAL
Qty: 30 | Refills: 0
Start: 2023-03-16 | End: 2023-04-14

## 2023-03-16 RX ORDER — APIXABAN 2.5 MG/1
1 TABLET, FILM COATED ORAL
Qty: 60 | Refills: 0
Start: 2023-03-16 | End: 2023-05-19

## 2023-03-16 RX ORDER — APIXABAN 2.5 MG/1
1 TABLET, FILM COATED ORAL
Qty: 60 | Refills: 0
Start: 2023-03-16 | End: 2023-04-14

## 2023-03-16 RX ORDER — METOPROLOL TARTRATE 50 MG
1 TABLET ORAL
Qty: 30 | Refills: 0
Start: 2023-03-16 | End: 2023-04-14

## 2023-03-16 RX ORDER — PANTOPRAZOLE SODIUM 20 MG/1
1 TABLET, DELAYED RELEASE ORAL
Qty: 14 | Refills: 0
Start: 2023-03-16 | End: 2023-03-29

## 2023-03-16 RX ORDER — ZOLPIDEM TARTRATE 10 MG/1
1 TABLET ORAL
Qty: 0 | Refills: 0 | DISCHARGE
Start: 2023-03-16

## 2023-03-16 RX ADMIN — LOSARTAN POTASSIUM 25 MILLIGRAM(S): 100 TABLET, FILM COATED ORAL at 06:10

## 2023-03-16 RX ADMIN — PANTOPRAZOLE SODIUM 40 MILLIGRAM(S): 20 TABLET, DELAYED RELEASE ORAL at 06:11

## 2023-03-16 RX ADMIN — Medication 25 MILLIGRAM(S): at 06:11

## 2023-03-16 RX ADMIN — DAPAGLIFLOZIN 10 MILLIGRAM(S): 10 TABLET, FILM COATED ORAL at 10:47

## 2023-03-16 RX ADMIN — AMIODARONE HYDROCHLORIDE 200 MILLIGRAM(S): 400 TABLET ORAL at 06:10

## 2023-03-16 RX ADMIN — Medication 1 SPRAY(S): at 06:11

## 2023-03-16 RX ADMIN — Medication 25 MILLIGRAM(S): at 10:49

## 2023-03-16 RX ADMIN — APIXABAN 5 MILLIGRAM(S): 2.5 TABLET, FILM COATED ORAL at 10:47

## 2023-03-16 NOTE — DISCHARGE NOTE NURSING/CASE MANAGEMENT/SOCIAL WORK - NSDCPEFALRISK_GEN_ALL_CORE
For information on Fall & Injury Prevention, visit: [email or web address] OR  [email or web address] OR  https://www.cdc.gov/steadi/patient.html

## 2023-03-16 NOTE — DISCHARGE NOTE PROVIDER - CARE PROVIDER_API CALL
Shivam Reardon ()  Internal Medicine  [address] [facility]  [place] [address]  Phone: [phone]  Fax: [phone]  Follow Up Time: 1 week    Mickey Falk)  Cardiovascular Disease; Internal Medicine; Interventional Cardiology  [address] [facility]  [place] [address]  Phone: [phone]  Fax: [phone]  Established Patient  Follow Up Time: 1 week    Tomasz Gallo)  Urology  [address] [facility], [address]  [place] [address]  Phone: [phone]  Fax: [phone]  Established Patient  Follow Up Time: 1 week   Shivam Reardon ()  Internal Medicine  [address] [facility]  [place] [address]  Phone: [phone]  Fax: [phone]  Follow Up Time: 1 week    Mickey Falk)  Cardiovascular Disease; Internal Medicine; Interventional Cardiology  [address] [facility]  [place] [address]  Phone: [phone]  Fax: [phone]  Established Patient  Follow Up Time: 1 week    Tomasz Gallo)  Urology  [address] [facility]  [place] [address]  Phone: [phone]  Fax: [phone]  Established Patient  Follow Up Time: 1 week    Adria Kendall)  Critical Care Medicine; Internal Medicine; Pulmonary Disease  [address] [place] [address]  Phone: [phone]  Fax: [phone]  Follow Up Time: 1 week

## 2023-03-16 NOTE — PROGRESS NOTE ADULT - ASSESSMENT
61 yo M PMHx HTN presented for evaluation of epistaxis and palpitations x1 day. Pt reports that he had a nosebleed the day prior to presentation that had not stopped since then, so he come to ER. He also complained of palpitations and an "anxious feeling" intermittent over the past few months. On further questioning pt reports that he hasn't seen a PCP in over a year, and during that period he has been having orthopnea, exertional dyspnea, and occasional LE swelling.     A/P:   New-onset  Paroxysmal Atrial flutter with Rapid Ventricular Response:   Back to sinus rhythm today.   s/p amiodarone drip, continue Amiodarone PO 200mg BID for two weeks then 200mg po daily. Continue Eliquis.   s/p GABINO and cardioversion 3/10 two shock failed.   EP follow up outpatient.     Acute HFrEF ,likely tachycardia induced cardiomyopathy  Patient with orthopnea, LE swelling, exertional dyspnea  CXR mild cardiomegaly but no consolidations/effusions  Echo showed LVEF 35-40%,   Euvolemic  Continue Metoprolol, Losartan and Farxiga.   Cardiology follow up.     Hydronephrosis  incidentally found on  urology consulted, recommended CT abdomen and then Lasix scan  no evidence of definite obstructing lesion or nephrolithiasis is not identified.   Urology follow up as outpt with Dr. Grewal    HTN  HTN Urgency due to medication noncompliance.   BP is now controlled  Continue Losartan and Metoprolol.     Transaminitis  likely due to congestion hepatopathy  Abdomen US unremarkable for hepatobiliary pathology    Macrocytosis: B12 and folate normal.     #Progress Note Handoff  Pending (specify):    Family discussion:  Disposition: home today.

## 2023-03-16 NOTE — DISCHARGE NOTE NURSING/CASE MANAGEMENT/SOCIAL WORK - NSDCVIVACCINE_GEN_ALL_CORE_FT
Tdap; 02-Sep-2021 13:39; Kirsten Magaña (RN); [employer]; [id number] (Exp. Date: 28-Jan-2023); IntraMuscular; Deltoid Left.; 0.5 milliLiter(s); VIS (VIS Published: 09-May-2013, VIS Presented: 02-Sep-2021);

## 2023-03-16 NOTE — PROGRESS NOTE ADULT - REASON FOR ADMISSION
Afib w/ RVR, epistaxis

## 2023-03-16 NOTE — DISCHARGE NOTE PROVIDER - PROVIDER TOKENS
PROVIDER:[TOKEN:[[id number]],FOLLOWUP:[1 week]],PROVIDER:[TOKEN:[[id number]],FOLLOWUP:[1 week],ESTABLISHEDPATIENT:[T]],PROVIDER:[TOKEN:[[id number]],FOLLOWUP:[1 week],ESTABLISHEDPATIENT:[T]] PROVIDER:[TOKEN:[[id number]],FOLLOWUP:[1 week]],PROVIDER:[TOKEN:[[id number]],FOLLOWUP:[1 week],ESTABLISHEDPATIENT:[T]],PROVIDER:[TOKEN:[[id number]],FOLLOWUP:[1 week],ESTABLISHEDPATIENT:[T]],PROVIDER:[TOKEN:[[id number]],FOLLOWUP:[1 week]]

## 2023-03-16 NOTE — DISCHARGE NOTE PROVIDER - NSDCCPCAREPLAN_GEN_ALL_CORE_FT
PRINCIPAL DISCHARGE DIAGNOSIS  Diagnosis: Acute CHF  Assessment and Plan of Treatment: Congestive heart failure (CHF) is a chronic condition in which the heart has trouble pumping blood. In some cases of heart failure, fluid may back up into your lungs or you may have swelling (edema) in your lower legs. There are many causes of heart failure including high blood pressure, coronary artery disease, abnormal heart valves, heart muscle disease, lung disease, diabetes, etc. Symptoms include shortness of breath with activity or when lying flat, cough, swelling of the legs, fatigue, or increased urination during the night.   Treatment is aimed at managing the symptoms of heart failure and may include lifestyle changes, medications, or surgical procedures. Take medicines only as directed by your health care provider and do not stop unless instructed to do so. Eat heart-healthy foods with low or no trans/saturated fats, cholesterol and salt. Weigh yourself every day for early recognition of fluid accumulation.  SEEK IMMEDIATE MEDICAL CARE IF YOU HAVE ANY OF THE FOLLOWING SYMPTOMS: shortness of breath, change in mental status, chest pain, lightheadedness/dizziness/fainting, or worsening of symptoms including not being able to conduct normal physical activity.        SECONDARY DISCHARGE DIAGNOSES  Diagnosis: Afib  Assessment and Plan of Treatment: Atrial fibrillation is a type of irregular heartbeat (arrhythmia) where the heart quivers continuously in a chaotic pattern that makes the heart unable to pump blood normally. This can increase the risk for stroke, heart failure, and other heart-related conditions. Atrial fibrillation can be caused by a variety of conditions and may be temporary, intermittent, or permanent. Symptoms include feeling that your heart is beating rapidly or irregularly, chest discomfort, shortness of breath, or dizziness/lightheadedness that may be worse with exertion. Treatment is varied but may involve medication or electrical shock (cardioversion).  SEEK IMMEDIATE MEDICAL CARE IF YOU HAVE ANY OF THE FOLLOWING SYMPTOMS: chest pain, shortness of breath, abdominal pain, sweating, vomiting, blood in vomit/bowel movements/urine, dizziness/lightheadedness, weakness or numbness to face/arm/leg, trouble speaking or understanding, facial droop.      Diagnosis: Bleeding nose  Assessment and Plan of Treatment:      PRINCIPAL DISCHARGE DIAGNOSIS  Diagnosis: Acute CHF  Assessment and Plan of Treatment: Congestive heart failure (CHF) is a chronic condition in which the heart has trouble pumping blood. In some cases of heart failure, fluid may back up into your lungs or you may have swelling (edema) in your lower legs. There are many causes of heart failure including high blood pressure, coronary artery disease, abnormal heart valves, heart muscle disease, lung disease, diabetes, etc. Symptoms include shortness of breath with activity or when lying flat, cough, swelling of the legs, fatigue, or increased urination during the night.   Treatment is aimed at managing the symptoms of heart failure and may include lifestyle changes, medications, or surgical procedures. Take medicines only as directed by your health care provider and do not stop unless instructed to do so. Eat heart-healthy foods with low or no trans/saturated fats, cholesterol and salt. Weigh yourself every day for early recognition of fluid accumulation.  SEEK IMMEDIATE MEDICAL CARE IF YOU HAVE ANY OF THE FOLLOWING SYMPTOMS: shortness of breath, change in mental status, chest pain, lightheadedness/dizziness/fainting, or worsening of symptoms including not being able to conduct normal physical activity.        SECONDARY DISCHARGE DIAGNOSES  Diagnosis: Afib  Assessment and Plan of Treatment: Atrial fibrillation is a type of irregular heartbeat (arrhythmia) where the heart quivers continuously in a chaotic pattern that makes the heart unable to pump blood normally. This can increase the risk for stroke, heart failure, and other heart-related conditions. Atrial fibrillation can be caused by a variety of conditions and may be temporary, intermittent, or permanent. Symptoms include feeling that your heart is beating rapidly or irregularly, chest discomfort, shortness of breath, or dizziness/lightheadedness that may be worse with exertion. Treatment is varied but may involve medication or electrical shock (cardioversion).  SEEK IMMEDIATE MEDICAL CARE IF YOU HAVE ANY OF THE FOLLOWING SYMPTOMS: chest pain, shortness of breath, abdominal pain, sweating, vomiting, blood in vomit/bowel movements/urine, dizziness/lightheadedness, weakness or numbness to face/arm/leg, trouble speaking or understanding, facial droop.      Diagnosis: Bleeding nose  Assessment and Plan of Treatment:     Diagnosis: SANJU (obstructive sleep apnea)  Assessment and Plan of Treatment: Please follow up as outpatient with Adria Love for sleep studies.

## 2023-03-16 NOTE — DISCHARGE NOTE PROVIDER - CARE PROVIDERS DIRECT ADDRESSES
,DirectAddress_Unknown,[email or web address] ,DirectAddress_Unknown,[email or web address],DirectAddress_Unknown

## 2023-03-16 NOTE — DISCHARGE NOTE PROVIDER - NSDCFUADDAPPT_GEN_ALL_CORE_FT
APPTS ARE READY TO BE MADE: [x] YES    Best Family or Patient Contact (if needed): [phone]    Additional Information about above appointments (if needed):    1: Mickey Falk (MD)  2:   3:     Other comments or requests:    APPTS ARE READY TO BE MADE: [x] YES    Best Family or Patient Contact (if needed): [phone]    Additional Information about above appointments (if needed):    1: Mickey Falk (MD)  2:   3:     Other comments or requests:   Patient was scheduled with Dr. Brynn Falk on 3/20 1:30 pm at [address] [place] [address]. Patient advised of appointment details.     Patient was scheduled with Dr. Mickey Falk on 3/21 2:30 pm at [address] [place] [address], [facility] [address] [place] [address]. Patient advised of appointment details.     Patient was advised of follow up requests and asked for scheduling assistance. Will await a call back from Dr. Isai Delgado office to confirm appointment details.     Patient was provided with follow up request details for Dr. Gallo and was advised to call to schedule follow up within specified time frame.

## 2023-03-16 NOTE — DISCHARGE NOTE PROVIDER - HOSPITAL COURSE
62M with PMHx of HTN presents to the ED for epistaxis and palpitations x1 day. Pt reports that he had a nosebleed yesterday that has not stopped since then, so he presented for evaluation. He also complains of palpitations and an "anxious feeling" intermittent over the past few months. On further questioning pt reports that he hasn't seen a PCP in over a year, and during that period he has been having orthopnea, exertional dyspnea, and occasional LE swelling. Admitted for New onset CHF and Afib RVR. Echo was done and showed EF 35 to 40% (ischemic workup to be followed outpatient). He was given lasix then stopped due to elevated creatinine and patient being euvolemic. He is discharged on PRN Lasix.   For the Afib RVR patient went for cardioversion but it was unsuccessful. However, he spontaneously reverted to Sinus rhythm and then back to Afib/A. flutter RVR. He was loaded with amiodarone and rhythm was reverted again to sinus.  During admission imaging showed Right hydronephrosis, no stones on CT scan, Mag3 Renal scan was done and showed asymmetric findings which need to be followed as outpatient as per Urology.

## 2023-03-16 NOTE — DISCHARGE NOTE PROVIDER - NSDCMRMEDTOKEN_GEN_ALL_CORE_FT
amiodarone 200 mg oral tablet: 1 tab(s) orally 2 times a day til 3/28/2023.  Then 1 tab orally once a day starting 3/29/2023.  apixaban 5 mg oral tablet: 1 tab(s) orally every 12 hours  dapagliflozin 10 mg oral tablet: 1 tab(s) orally every 24 hours  Lasix 20 mg oral tablet: 1 tab(s) orally every other day, As Needed for Lower leg swelling.  losartan 25 mg oral tablet: 1 tab(s) orally once a day  metoprolol succinate 100 mg oral tablet, extended release: 1 tab(s) orally once a day   pantoprazole 40 mg oral delayed release tablet: 1 tab(s) orally once a day (before a meal)  tamsulosin 0.4 mg oral capsule: 1 cap(s) orally once a day (at bedtime)  zolpidem 5 mg oral tablet: 1 tab(s) orally once a day (at bedtime)   Ambien 5 mg oral tablet: 1 tab(s) orally once a day (at bedtime) MDD:1  amiodarone 200 mg oral tablet: 1 tab(s) orally 2 times a day til 3/28/2023.  Then 1 tab orally once a day starting 3/29/2023.  apixaban 5 mg oral tablet: 1 tab(s) orally every 12 hours  dapagliflozin 10 mg oral tablet: 1 tab(s) orally every 24 hours  Lasix 20 mg oral tablet: 1 tab(s) orally every other day, As Needed for Lower leg swelling.  losartan 25 mg oral tablet: 1 tab(s) orally once a day  metoprolol succinate 100 mg oral tablet, extended release: 1 tab(s) orally once a day   pantoprazole 40 mg oral delayed release tablet: 1 tab(s) orally once a day (before a meal)  tamsulosin 0.4 mg oral capsule: 1 cap(s) orally once a day (at bedtime)  zolpidem 5 mg oral tablet: 1 tab(s) orally once a day (at bedtime)

## 2023-03-16 NOTE — DISCHARGE NOTE NURSING/CASE MANAGEMENT/SOCIAL WORK - NSDCFUADDAPPT_GEN_ALL_CORE_FT
APPTS ARE READY TO BE MADE: [x] YES    Best Family or Patient Contact (if needed): [phone]    Additional Information about above appointments (if needed):    1: Mickey Falk (MD)  2:   3:     Other comments or requests:

## 2023-03-16 NOTE — PROGRESS NOTE ADULT - SUBJECTIVE AND OBJECTIVE BOX
BEHAN, JAMES  62y  Male      Patient is a 62y old  Male who presents with a chief complaint of Afib w/ RVR, epistaxis (16 Mar 2023 09:53)      INTERVAL HPI/OVERNIGHT EVENTS:  He feels ok, no chest pain or SOB.   Vital Signs Last 24 Hrs  T(C): 36.1 (16 Mar 2023 12:00), Max: 36.7 (15 Mar 2023 20:20)  T(F): 97 (16 Mar 2023 12:00), Max: 98 (15 Mar 2023 20:20)  HR: 61 (16 Mar 2023 12:00) (57 - 62)  BP: 120/67 (16 Mar 2023 12:00) (104/67 - 124/72)  BP(mean): --  RR: 20 (16 Mar 2023 12:00) (18 - 20)  SpO2: 95% (16 Mar 2023 12:00) (95% - 95%)    Parameters below as of 16 Mar 2023 12:00  Patient On (Oxygen Delivery Method): room air          03-15-23 @ 07:01  -  03-16-23 @ 07:00  --------------------------------------------------------  IN: 692 mL / OUT: 500 mL / NET: 192 mL    03-16-23 @ 07:01  - 03-16-23 @ 13:26  --------------------------------------------------------  IN: 286 mL / OUT: 300 mL / NET: -14 mL            Consultant(s) Notes Reviewed:  [x ] YES  [ ] NO          MEDICATIONS  (STANDING):  aMIOdarone    Tablet 200 milliGRAM(s) Oral two times a day  aMIOdarone Infusion 1 mG/Min (33.3 mL/Hr) IV Continuous <Continuous>  aMIOdarone Infusion 0.5 mG/Min (16.7 mL/Hr) IV Continuous <Continuous>  apixaban 5 milliGRAM(s) Oral every 12 hours  dapagliflozin 10 milliGRAM(s) Oral every 24 hours  influenza   Vaccine 0.5 milliLiter(s) IntraMuscular once  losartan 25 milliGRAM(s) Oral daily  metoprolol tartrate 25 milliGRAM(s) Oral every 6 hours  pantoprazole    Tablet 40 milliGRAM(s) Oral before breakfast  sodium chloride 0.65% Nasal 1 Spray(s) Both Nostrils two times a day  tamsulosin 0.4 milliGRAM(s) Oral at bedtime  zolpidem 5 milliGRAM(s) Oral at bedtime    MEDICATIONS  (PRN):  acetaminophen     Tablet .. 650 milliGRAM(s) Oral every 6 hours PRN Temp greater or equal to 38C (100.4F), Mild Pain (1 - 3)  melatonin 5 milliGRAM(s) Oral at bedtime PRN Insomnia      LABS                          17.6   9.87  )-----------( 204      ( 15 Mar 2023 06:38 )             51.7     03-15    139  |  104  |  21<H>  ----------------------------<  99  3.8   |  21  |  1.2    Ca    9.1      15 Mar 2023 06:38  Mg     2.0     03-15    TPro  6.6  /  Alb  3.6  /  TBili  0.9  /  DBili  x   /  AST  35  /  ALT  54<H>  /  AlkPhos  72  03-15          Lactate Trend        CAPILLARY BLOOD GLUCOSE            RADIOLOGY & ADDITIONAL TESTS:    Imaging Personally Reviewed:  [ ] YES  [ ] NO    HEALTH ISSUES - PROBLEM Dx:          PHYSICAL EXAM:  GENERAL: NAD, well-developed.  HEAD:  Atraumatic, Normocephalic.  EYES: EOMI, PERRLA, conjunctiva and sclera clear.  NECK: Supple, No JVD.  CHEST/LUNG: Clear to auscultation bilaterally; No wheeze.  HEART: Regular rate and rhythm; S1 S2.   ABDOMEN: Soft, Nontender, Nondistended; Bowel sounds present.  EXTREMITIES:  2+ Peripheral Pulses, No clubbing, cyanosis, or edema.  PSYCH: AAOx3.  NEUROLOGY: non-focal.  SKIN: No rashes or lesions.

## 2023-03-16 NOTE — PROGRESS NOTE ADULT - PROVIDER SPECIALTY LIST ADULT
Electrophysiology
Electrophysiology
Hospitalist
Internal Medicine
Hospitalist
Internal Medicine
Cardiology
Electrophysiology
Hospitalist
Cardiology

## 2023-03-16 NOTE — DISCHARGE NOTE PROVIDER - NSDCFUSCHEDAPPT_GEN_ALL_CORE_FT
Jasmine Monaco  [facility]  ELECTROPH [address] [place] [address]  Scheduled Appointment: 04/17/2023     Brynn Falk  [facility]  Scheduled Appointment: 03/20/2023    Brynn Falk  [facility]  INTMED  [address]  Scheduled Appointment: 03/20/2023    Mickey Falk  [facility]  CARDIOLOGY [address]  Scheduled Appointment: 03/21/2023    Jasmine Monaco  [facility]  ELECTROPH [address]  Scheduled Appointment: 04/17/2023

## 2023-03-20 ENCOUNTER — APPOINTMENT (OUTPATIENT)
Dept: INTERNAL MEDICINE | Facility: CLINIC | Age: 63
End: 2023-03-20
Payer: COMMERCIAL

## 2023-03-20 ENCOUNTER — OUTPATIENT (OUTPATIENT)
Dept: OUTPATIENT SERVICES | Facility: HOSPITAL | Age: 63
LOS: 1 days | End: 2023-03-20
Payer: COMMERCIAL

## 2023-03-20 VITALS
HEIGHT: 68.5 IN | BODY MASS INDEX: 35.06 KG/M2 | TEMPERATURE: 98.8 F | DIASTOLIC BLOOD PRESSURE: 90 MMHG | OXYGEN SATURATION: 97 % | HEART RATE: 77 BPM | WEIGHT: 234 LBS | SYSTOLIC BLOOD PRESSURE: 146 MMHG

## 2023-03-20 VITALS — SYSTOLIC BLOOD PRESSURE: 131 MMHG | DIASTOLIC BLOOD PRESSURE: 85 MMHG

## 2023-03-20 DIAGNOSIS — Z00.00 ENCOUNTER FOR GENERAL ADULT MEDICAL EXAMINATION WITHOUT ABNORMAL FINDINGS: ICD-10-CM

## 2023-03-20 DIAGNOSIS — Z00.00 ENCOUNTER FOR GENERAL ADULT MEDICAL EXAMINATION W/OUT ABNORMAL FINDINGS: ICD-10-CM

## 2023-03-20 DIAGNOSIS — F17.200 NICOTINE DEPENDENCE, UNSPECIFIED, UNCOMPLICATED: ICD-10-CM

## 2023-03-20 PROCEDURE — 99214 OFFICE O/P EST MOD 30 MIN: CPT | Mod: GC

## 2023-03-20 PROCEDURE — 99214 OFFICE O/P EST MOD 30 MIN: CPT

## 2023-03-20 RX ORDER — CEPHALEXIN 750 MG/1
750 CAPSULE ORAL
Qty: 10 | Refills: 0 | Status: DISCONTINUED | COMMUNITY
Start: 2022-09-28 | End: 2023-03-20

## 2023-03-20 NOTE — PHYSICAL EXAM
[No Acute Distress] : no acute distress [Well Nourished] : well nourished [Well Developed] : well developed [Well-Appearing] : well-appearing [Normal Sclera/Conjunctiva] : normal sclera/conjunctiva [PERRL] : pupils equal round and reactive to light [Normal Outer Ear/Nose] : the outer ears and nose were normal in appearance [Normal Oropharynx] : the oropharynx was normal [No JVD] : no jugular venous distention [No Lymphadenopathy] : no lymphadenopathy [No Respiratory Distress] : no respiratory distress  [No Accessory Muscle Use] : no accessory muscle use [Clear to Auscultation] : lungs were clear to auscultation bilaterally [Normal Rate] : normal rate  [Normal S1, S2] : normal S1 and S2 [No Murmur] : no murmur heard [No Carotid Bruits] : no carotid bruits [No Edema] : there was no peripheral edema [Soft] : abdomen soft [Non Tender] : non-tender [Normal Posterior Cervical Nodes] : no posterior cervical lymphadenopathy [Normal Anterior Cervical Nodes] : no anterior cervical lymphadenopathy [No CVA Tenderness] : no CVA  tenderness [No Spinal Tenderness] : no spinal tenderness [No Joint Swelling] : no joint swelling [No Focal Deficits] : no focal deficits [Normal Affect] : the affect was normal [Normal Insight/Judgement] : insight and judgment were intact [de-identified] : tachycardic

## 2023-03-20 NOTE — REVIEW OF SYSTEMS
[Fatigue] : fatigue [Nosebleeds] : nosebleeds [Shortness Of Breath] : shortness of breath [Dyspnea on Exertion] : dyspnea on exertion [Nocturia] : nocturia [Frequency] : frequency [Joint Pain] : joint pain [Headache] : headache [Insomnia] : insomnia [Fever] : no fever [Chills] : no chills [Discharge] : no discharge [Pain] : no pain [Redness] : no redness [Itching] : no itching [Earache] : no earache [Hearing Loss] : no hearing loss [Sore Throat] : no sore throat [Chest Pain] : no chest pain [Palpitations] : no palpitations [Wheezing] : no wheezing [Cough] : no cough [Abdominal Pain] : no abdominal pain [Constipation] : no constipation [Diarrhea] : no diarrhea [Vomiting] : no vomiting [Heartburn] : no heartburn [Dysuria] : no dysuria [Hesitancy] : no hesitancy [Hematuria] : no hematuria [Itching] : no itching [Skin Rash] : no skin rash [Dizziness] : no dizziness [Suicidal] : not suicidal [FreeTextEntry4] : Since discharge from hospital 3/16/2023 no episodes of epistaxis [FreeTextEntry6] : SOB on exertion  [FreeTextEntry9] : Bilateral knee pain, hx of acl repair of the left knee, Lumbar pain upon standing too long  [de-identified] : occasional occipital headache/ neck stiffness

## 2023-03-20 NOTE — ASSESSMENT
[FreeTextEntry1] : 62 M with PMH of HTN, SANJU, HfrEF, Paroxysmal A-fib on Eliquis, Epistaxis, and BPH presents today to the clinic to establish care.\par \par # Epistaxis\par - recently admitted to the hospital for Epistaxis \par - Follows with Dr. Collins \par - No episodes of epistaxis since discharge from hospital on march 16 2023 \par \par # Paroxysmal  A-fib with RVR\par - Newly diagnosed during hospitalization \par - On Eliquis 5mg BID\par - On Metoprolol 100 mg OD \par - Amiodarone 200 mg BID, until 3/28 than OD starting 3/29\par - Appointment with Dr. Carlos Enrique PATTERSON on 04/17/2023 \par \par # HfrEF  \par - Echo showed EF 35 - 40 % \par - on Metoprolol 100 mg OD\par - Losartan 25 mg OD \par - Dapagliflozin 10 mg OD \par - Lasix 20 mg as needed\par - Appointment with Dr. Falk 03/21/2023 for further ischemic workup \par \par # BPH\par - Tamsulosin\par - Hydronephrosis on imaging, no evidence of kidney stones  \par - Urology follow up, needs appointment \par \par # SANJU \par - STOP BANG score 8, very high risk \par - Referral for sleep study\par - already has appointment with pulm 04/24/2023\par \par #HCM \par - Never had a colonoscopy, will refer to GI, patient is agreeable. \par - Refused Flu vaccine at this time \par - Hx of smoking, advised on low-dose CT chest, patient not agreeable at this time. He would like to deal with his active disease at this moment. \par - RTC in 3 months

## 2023-03-20 NOTE — HISTORY OF PRESENT ILLNESS
[FreeTextEntry1] : New comprehensive  [de-identified] : 62 M with PMH of HTN, SANJU, HfrEF, Paroxysmal A-fib on Eliquis, Epistaxis, and BPH presents today to the clinic to establish care. Patient visited the ED this month for epistaxis and palpitations x 1 day. In ED, patient also endorsed having orthopnea, exertional dyspnea, and occasional LE swelling. He was hospitalized and was found to have new onset CHF ( Echo showed EF 35 to 45%) and A-fib RVR. Also during hospitalization  imaging showed Right hydronephrosis, no stones on CT scan. Renal scan was done and showed asymmetric findings which need to be followed as outpatient as per Urology. Patient did not see his PCP for 4 years. Was only taking during this period amlodipine and losartan for HTN.

## 2023-03-21 ENCOUNTER — APPOINTMENT (OUTPATIENT)
Dept: CARDIOLOGY | Facility: CLINIC | Age: 63
End: 2023-03-21
Payer: COMMERCIAL

## 2023-03-21 ENCOUNTER — RESULT CHARGE (OUTPATIENT)
Age: 63
End: 2023-03-21

## 2023-03-21 VITALS
HEIGHT: 65 IN | HEART RATE: 120 BPM | WEIGHT: 234 LBS | BODY MASS INDEX: 38.99 KG/M2 | DIASTOLIC BLOOD PRESSURE: 88 MMHG | SYSTOLIC BLOOD PRESSURE: 142 MMHG

## 2023-03-21 DIAGNOSIS — Z80.0 FAMILY HISTORY OF MALIGNANT NEOPLASM OF DIGESTIVE ORGANS: ICD-10-CM

## 2023-03-21 PROCEDURE — 93000 ELECTROCARDIOGRAM COMPLETE: CPT

## 2023-03-21 PROCEDURE — 99214 OFFICE O/P EST MOD 30 MIN: CPT

## 2023-03-21 RX ORDER — LOSARTAN POTASSIUM 25 MG/1
25 TABLET, FILM COATED ORAL DAILY
Qty: 3 | Refills: 0 | Status: DISCONTINUED | COMMUNITY
Start: 2023-03-20 | End: 2023-03-21

## 2023-03-21 NOTE — PHYSICAL EXAM
[Well Developed] : well developed [Well Nourished] : well nourished [No Acute Distress] : no acute distress [Obese] : obese [Normal Conjunctiva] : normal conjunctiva [Normal Venous Pressure] : normal venous pressure [No Carotid Bruit] : no carotid bruit [Normal S1, S2] : normal S1, S2 [No Murmur] : no murmur [No Rub] : no rub [No Gallop] : no gallop [Clear Lung Fields] : clear lung fields [Good Air Entry] : good air entry [No Respiratory Distress] : no respiratory distress  [Soft] : abdomen soft [Non Tender] : non-tender [No Masses/organomegaly] : no masses/organomegaly [Normal Bowel Sounds] : normal bowel sounds [Normal Gait] : normal gait [No Edema] : no edema [No Cyanosis] : no cyanosis [No Clubbing] : no clubbing [No Varicosities] : no varicosities [No Skin Lesions] : no skin lesions [No Rash] : no rash [Moves all extremities] : moves all extremities [No Focal Deficits] : no focal deficits [Normal Speech] : normal speech [Alert and Oriented] : alert and oriented [Normal memory] : normal memory [de-identified] : tachycardic

## 2023-03-21 NOTE — HISTORY OF PRESENT ILLNESS
[FreeTextEntry1] : Pt is 62 year old male with PMH of epistaxis, insomnia,  palpitations, HTN.  Pt is seen s/p hospitalization due to Palpitations and epistaxis.  Pt was dx with new onset  Paroxysmal Atrial flutter with Rapid Ventricular Response. Recieved amiodarone drip, s/p GABINO and cardioversion 3/10 two shock failed.  Pt was started on Eliquis no s.s of bleeding reported.  Pt was dx with  Acute HFrEF, cardiomegaly, TTE EF 35-40%.  Pt will f.u with pulm to r/o SANJU.  Pt reports snoring and poor quality of sleep.  Pt denies chest pain, no palpitations,  SOB with exertion, no edema.\par Currently smoking 5 cigarettes per day.  Hx of smoking 1/2 pack x 30 years. In flutter 2:1 AVB at 120/min.\par   \par  \par  \par

## 2023-03-21 NOTE — REASON FOR VISIT
[CV Risk Factors and Non-Cardiac Disease] : CV risk factors and non-cardiac disease [Hypertension] : hypertension [Arrhythmia/ECG Abnorrmalities] : arrhythmia/ECG abnormalities

## 2023-03-21 NOTE — ASSESSMENT
[FreeTextEntry1] : 61 y/o male with recent hospital admission\par PAF/flutter\par Back in Flutter 2:1 despite Amiodarone 200 mg q12\par Anticoagulated with Eliquis\par Systolic dysfunction, EF 35-40% by echo - likely tachy induced.\par Does not want to go to ER\par \par \par Plan:\par \par Increase metoprolol to 100 mg q12 for better rate control\par C/w Amiodarone\par Will call EP to move up his appointment - will need ablation.\par C/w anticoagulation\par \par C/w ARB, diuretic\par BP control\par \par Pulmonary evaluation - likely has SANJU, will need sleep study, CPAP\par \par Once rate controlled, repeat echo to assess LV function, if still depressed, will need ischemic w/u.\par \par Discussed in detail.\par In the meantime, I have also advised the patient to go to the nearest emergency room if he experiences any chest pain, dyspnea, syncope, or has any other compelling symptoms.\par \par

## 2023-03-23 DIAGNOSIS — I42.8 OTHER CARDIOMYOPATHIES: ICD-10-CM

## 2023-03-23 DIAGNOSIS — N13.0 HYDRONEPHROSIS WITH URETEROPELVIC JUNCTION OBSTRUCTION: ICD-10-CM

## 2023-03-23 DIAGNOSIS — G47.33 OBSTRUCTIVE SLEEP APNEA (ADULT) (PEDIATRIC): ICD-10-CM

## 2023-03-23 DIAGNOSIS — F17.200 NICOTINE DEPENDENCE, UNSPECIFIED, UNCOMPLICATED: ICD-10-CM

## 2023-03-23 DIAGNOSIS — I50.21 ACUTE SYSTOLIC (CONGESTIVE) HEART FAILURE: ICD-10-CM

## 2023-03-23 DIAGNOSIS — Z91.14 PATIENT'S OTHER NONCOMPLIANCE WITH MEDICATION REGIMEN: ICD-10-CM

## 2023-03-23 DIAGNOSIS — R04.0 EPISTAXIS: ICD-10-CM

## 2023-03-23 DIAGNOSIS — R74.01 ELEVATION OF LEVELS OF LIVER TRANSAMINASE LEVELS: ICD-10-CM

## 2023-03-23 DIAGNOSIS — D72.829 ELEVATED WHITE BLOOD CELL COUNT, UNSPECIFIED: ICD-10-CM

## 2023-03-23 DIAGNOSIS — I48.92 UNSPECIFIED ATRIAL FLUTTER: ICD-10-CM

## 2023-03-23 DIAGNOSIS — I11.0 HYPERTENSIVE HEART DISEASE WITH HEART FAILURE: ICD-10-CM

## 2023-03-23 DIAGNOSIS — I16.0 HYPERTENSIVE URGENCY: ICD-10-CM

## 2023-03-23 DIAGNOSIS — I45.10 UNSPECIFIED RIGHT BUNDLE-BRANCH BLOCK: ICD-10-CM

## 2023-03-23 DIAGNOSIS — I48.0 PAROXYSMAL ATRIAL FIBRILLATION: ICD-10-CM

## 2023-03-23 DIAGNOSIS — D75.89 OTHER SPECIFIED DISEASES OF BLOOD AND BLOOD-FORMING ORGANS: ICD-10-CM

## 2023-03-23 DIAGNOSIS — R73.03 PREDIABETES: ICD-10-CM

## 2023-03-23 DIAGNOSIS — I44.1 ATRIOVENTRICULAR BLOCK, SECOND DEGREE: ICD-10-CM

## 2023-03-24 DIAGNOSIS — I48.0 PAROXYSMAL ATRIAL FIBRILLATION: ICD-10-CM

## 2023-03-24 DIAGNOSIS — R04.0 EPISTAXIS: ICD-10-CM

## 2023-03-24 DIAGNOSIS — N40.0 BENIGN PROSTATIC HYPERPLASIA WITHOUT LOWER URINARY TRACT SYMPTOMS: ICD-10-CM

## 2023-03-24 DIAGNOSIS — Z00.00 ENCOUNTER FOR GENERAL ADULT MEDICAL EXAMINATION WITHOUT ABNORMAL FINDINGS: ICD-10-CM

## 2023-03-24 DIAGNOSIS — I50.9 HEART FAILURE, UNSPECIFIED: ICD-10-CM

## 2023-03-24 DIAGNOSIS — I10 ESSENTIAL (PRIMARY) HYPERTENSION: ICD-10-CM

## 2023-03-24 DIAGNOSIS — G47.33 OBSTRUCTIVE SLEEP APNEA (ADULT) (PEDIATRIC): ICD-10-CM

## 2023-03-29 ENCOUNTER — APPOINTMENT (OUTPATIENT)
Dept: ELECTROPHYSIOLOGY | Facility: CLINIC | Age: 63
End: 2023-03-29
Payer: COMMERCIAL

## 2023-03-29 VITALS
WEIGHT: 225 LBS | HEART RATE: 114 BPM | DIASTOLIC BLOOD PRESSURE: 88 MMHG | HEIGHT: 68 IN | SYSTOLIC BLOOD PRESSURE: 120 MMHG | BODY MASS INDEX: 34.1 KG/M2

## 2023-03-29 DIAGNOSIS — N17.9 ACUTE KIDNEY FAILURE, UNSPECIFIED: ICD-10-CM

## 2023-03-29 PROCEDURE — 99215 OFFICE O/P EST HI 40 MIN: CPT

## 2023-03-29 PROCEDURE — 99406 BEHAV CHNG SMOKING 3-10 MIN: CPT

## 2023-03-29 PROCEDURE — 93000 ELECTROCARDIOGRAM COMPLETE: CPT

## 2023-03-29 PROCEDURE — 99214 OFFICE O/P EST MOD 30 MIN: CPT

## 2023-03-29 RX ORDER — AMLODIPINE BESYLATE 10 MG/1
10 TABLET ORAL
Qty: 30 | Refills: 0 | Status: COMPLETED | COMMUNITY
Start: 2022-10-19 | End: 2023-03-29

## 2023-03-29 RX ORDER — ZOLPIDEM TARTRATE 5 MG/1
5 TABLET ORAL
Qty: 6 | Refills: 0 | Status: COMPLETED | COMMUNITY
Start: 2023-03-16 | End: 2023-03-29

## 2023-03-29 RX ORDER — AMIODARONE HYDROCHLORIDE 200 MG/1
200 TABLET ORAL TWICE DAILY
Qty: 60 | Refills: 2 | Status: DISCONTINUED | COMMUNITY
Start: 2023-03-20 | End: 2023-03-29

## 2023-03-29 NOTE — PHYSICAL EXAM
[Well Developed] : well developed [Well Nourished] : well nourished [No Acute Distress] : no acute distress [Obese] : obese [Normal Conjunctiva] : normal conjunctiva [Normal Venous Pressure] : normal venous pressure [Normal S1, S2] : normal S1, S2 [No Murmur] : no murmur [Clear Lung Fields] : clear lung fields [Good Air Entry] : good air entry [No Respiratory Distress] : no respiratory distress  [Soft] : abdomen soft [Normal Gait] : normal gait [No Edema] : no edema [No Rash] : no rash [Moves all extremities] : moves all extremities [Normal Speech] : normal speech [Alert and Oriented] : alert and oriented [de-identified] : tachycardic

## 2023-03-29 NOTE — DISCUSSION/SUMMARY
[FreeTextEntry1] : I discussed with patient the different management strategies of atrial fibrillation including rate control, rhythm control and ablative therapy. I also discussed with the patient in great length the role of anticoagulation in stroke prevention. Patient expressed understanding of the discussion. Patient is interested in restoring sinus rhythm through DC cardioversion. \par \par I explained that he will need a GABINO prior to cardioversion to rule out thrombus. I discussed risks and benefits of DC cardioversion including risk of aspiration, skin burn, stroke, and cardiac arrest. He expressed understanding and would like to proceed with cardioversion. My office staff will contact patient the patient with date, time and instructions.  [EKG obtained to assist in diagnosis and management of assessed problem(s)] : EKG obtained to assist in diagnosis and management of assessed problem(s)

## 2023-03-29 NOTE — HISTORY OF PRESENT ILLNESS
[FreeTextEntry1] : \par Cardiologist: Dr. Falk\par \par 63 yo M with history of epistaxis, HTN, palpitations and recent diagnosis of paroxysmal AF/AFl with RVR. He received amiodarone drip, s/p GABINO and cardioversion 3/10 and two shocks failed. He was started on Eliquis no s/sx of bleeding reported. Pt was dx with Acute HFrEF, cardiomegaly, TTE EF 35-40%. Pt will f/u with pulm to r/o SANJU. He denies chest pain, dyspnea and edema. \par \par Smokes 6 cigs/ day. \par

## 2023-03-29 NOTE — CARDIOLOGY SUMMARY
[de-identified] : 3/29/2023 AFlutter with RVR ( bpm) [de-identified] : TTE 3/10/2023\par  1. Moderately decreased global left ventricular systolic function with mild concentric LVEF, by visual estimation, is 35 to 40%.\par  2. Endocardial visualization was enhanced with intravenous echo contrast. There is no evidence of LV thrombus.\par  3. Diastolic function could not be assessed in this study due to atrial arrhythmia.\par  4. Normal right ventricular size with moderately reduced systolic function.\par  5. Mildly enlarged left atrium.\par  6. No hemodynamically significant valvular disease.\par  7. Mild pulmonary hypertension (PASP = 42mmHg).\par  8. No pericardial effusion.\par  9. NB: patient was in rapid atrial fibrillation during this exam.\par \par GABINO  3/10/2023 \par 1. Limited exam due to tenuous respiratory status and desaturation. Exam terminated early due to apnea.\par  2. No left atrial appendage thrombus and normal left atrial appendage velocities.\par  3. Moderately decreased global left ventricular systolic function.\par  4. Mildly enlarged left atrium.\par  5. Unsuccessful conversion after two attempts at 200J of direct current synchronized shock.

## 2023-03-29 NOTE — ASSESSMENT
[FreeTextEntry1] : # Paroxysmal AFib/AFlutter\par - Early return to AF after cardioversion. Patient was loaded with Amio. \par - Rec GABINO/CV \par - Cont Amio, Eliquis and Metoprolol. Pt has pulm referral and appt. Provided ophtho referral. Check routine labs since pt is on Amio and since he was recently started on OAC. \par - CHADS VASC 1 (HTN)\par - Discussed option of ablation, pt would like to try cardioversion first\par - Pulm follow up for SANJU\par \par # HTN\par - BP well controlled\par - Cont Metoprolol and Losartan/HCTZ\par - 2g Na diet enforced\par \par # Tobacco Use\par - Strongly advised pt to stop smoking. Discussed AF risk factors and also d/w pt use of meds or nicotine replacement to help with tobacco cessation. Spent 4-5 min discussing tobacco cessation.\par \par I have also advised the patient to go to the nearest emergency room if he experiences any chest pain, dyspnea, syncope, or has any other compelling symptoms.\par \par Follow up in 6 weeks\par

## 2023-04-05 ENCOUNTER — APPOINTMENT (OUTPATIENT)
Dept: ELECTROPHYSIOLOGY | Facility: CLINIC | Age: 63
End: 2023-04-05

## 2023-04-05 LAB
ALBUMIN SERPL ELPH-MCNC: 4.5 G/DL
ALP BLD-CCNC: 110 U/L
ALT SERPL-CCNC: 57 U/L
ANION GAP SERPL CALC-SCNC: 16 MMOL/L
AST SERPL-CCNC: 40 U/L
BILIRUB SERPL-MCNC: 0.3 MG/DL
BUN SERPL-MCNC: 14 MG/DL
CALCIUM SERPL-MCNC: 9.4 MG/DL
CHLORIDE SERPL-SCNC: 96 MMOL/L
CO2 SERPL-SCNC: 22 MMOL/L
CREAT SERPL-MCNC: 1 MG/DL
EGFR: 86 ML/MIN/1.73M2
GLUCOSE SERPL-MCNC: 113 MG/DL
HCT VFR BLD CALC: 54.1 %
HGB BLD-MCNC: 18.1 G/DL
MCHC RBC-ENTMCNC: 33.5 G/DL
MCHC RBC-ENTMCNC: 34.3 PG
MCV RBC AUTO: 102.7 FL
PLATELET # BLD AUTO: 266 K/UL
PMV BLD: 10.6 FL
POTASSIUM SERPL-SCNC: 4.7 MMOL/L
PROT SERPL-MCNC: 7.8 G/DL
RBC # BLD: 5.27 M/UL
RBC # FLD: 14.2 %
SODIUM SERPL-SCNC: 134 MMOL/L
T4 FREE SERPL-MCNC: 0.7 NG/DL
TSH SERPL-ACNC: 5.69 UIU/ML
WBC # FLD AUTO: 10.25 K/UL

## 2023-04-13 ENCOUNTER — OUTPATIENT (OUTPATIENT)
Dept: OUTPATIENT SERVICES | Facility: HOSPITAL | Age: 63
LOS: 1 days | End: 2023-04-13
Payer: COMMERCIAL

## 2023-04-13 VITALS
TEMPERATURE: 98 F | RESPIRATION RATE: 18 BRPM | HEIGHT: 68 IN | HEART RATE: 72 BPM | SYSTOLIC BLOOD PRESSURE: 140 MMHG | DIASTOLIC BLOOD PRESSURE: 88 MMHG | WEIGHT: 235.01 LBS | OXYGEN SATURATION: 98 %

## 2023-04-13 DIAGNOSIS — I48.19 OTHER PERSISTENT ATRIAL FIBRILLATION: ICD-10-CM

## 2023-04-13 DIAGNOSIS — Z01.818 ENCOUNTER FOR OTHER PREPROCEDURAL EXAMINATION: ICD-10-CM

## 2023-04-13 DIAGNOSIS — Z98.890 OTHER SPECIFIED POSTPROCEDURAL STATES: Chronic | ICD-10-CM

## 2023-04-13 LAB
ALBUMIN SERPL ELPH-MCNC: 4.7 G/DL — SIGNIFICANT CHANGE UP (ref 3.5–5.2)
ALP SERPL-CCNC: 83 U/L — SIGNIFICANT CHANGE UP (ref 30–115)
ALT FLD-CCNC: 58 U/L — HIGH (ref 0–41)
ANION GAP SERPL CALC-SCNC: 23 MMOL/L — HIGH (ref 7–14)
APTT BLD: 41.2 SEC — HIGH (ref 27–39.2)
AST SERPL-CCNC: 45 U/L — HIGH (ref 0–41)
BASOPHILS # BLD AUTO: 0.07 K/UL — SIGNIFICANT CHANGE UP (ref 0–0.2)
BASOPHILS NFR BLD AUTO: 0.8 % — SIGNIFICANT CHANGE UP (ref 0–1)
BILIRUB SERPL-MCNC: 0.7 MG/DL — SIGNIFICANT CHANGE UP (ref 0.2–1.2)
BUN SERPL-MCNC: 21 MG/DL — HIGH (ref 10–20)
CALCIUM SERPL-MCNC: 9.6 MG/DL — SIGNIFICANT CHANGE UP (ref 8.4–10.5)
CHLORIDE SERPL-SCNC: 93 MMOL/L — LOW (ref 98–110)
CO2 SERPL-SCNC: 18 MMOL/L — SIGNIFICANT CHANGE UP (ref 17–32)
CREAT SERPL-MCNC: 1.1 MG/DL — SIGNIFICANT CHANGE UP (ref 0.7–1.5)
EGFR: 76 ML/MIN/1.73M2 — SIGNIFICANT CHANGE UP
EOSINOPHIL # BLD AUTO: 0.15 K/UL — SIGNIFICANT CHANGE UP (ref 0–0.7)
EOSINOPHIL NFR BLD AUTO: 1.7 % — SIGNIFICANT CHANGE UP (ref 0–8)
GLUCOSE SERPL-MCNC: 87 MG/DL — SIGNIFICANT CHANGE UP (ref 70–99)
HCT VFR BLD CALC: 54.8 % — HIGH (ref 42–52)
HGB BLD-MCNC: 18.7 G/DL — HIGH (ref 14–18)
IMM GRANULOCYTES NFR BLD AUTO: 0.6 % — HIGH (ref 0.1–0.3)
INR BLD: 1.08 RATIO — SIGNIFICANT CHANGE UP (ref 0.65–1.3)
LYMPHOCYTES # BLD AUTO: 1.77 K/UL — SIGNIFICANT CHANGE UP (ref 1.2–3.4)
LYMPHOCYTES # BLD AUTO: 20.6 % — SIGNIFICANT CHANGE UP (ref 20.5–51.1)
MCHC RBC-ENTMCNC: 34.1 G/DL — SIGNIFICANT CHANGE UP (ref 32–37)
MCHC RBC-ENTMCNC: 34.2 PG — HIGH (ref 27–31)
MCV RBC AUTO: 100.4 FL — HIGH (ref 80–94)
MONOCYTES # BLD AUTO: 1.01 K/UL — HIGH (ref 0.1–0.6)
MONOCYTES NFR BLD AUTO: 11.7 % — HIGH (ref 1.7–9.3)
NEUTROPHILS # BLD AUTO: 5.56 K/UL — SIGNIFICANT CHANGE UP (ref 1.4–6.5)
NEUTROPHILS NFR BLD AUTO: 64.6 % — SIGNIFICANT CHANGE UP (ref 42.2–75.2)
NRBC # BLD: 0 /100 WBCS — SIGNIFICANT CHANGE UP (ref 0–0)
PLATELET # BLD AUTO: 213 K/UL — SIGNIFICANT CHANGE UP (ref 130–400)
PMV BLD: 9.9 FL — SIGNIFICANT CHANGE UP (ref 7.4–10.4)
POTASSIUM SERPL-MCNC: 4.7 MMOL/L — SIGNIFICANT CHANGE UP (ref 3.5–5)
POTASSIUM SERPL-SCNC: 4.7 MMOL/L — SIGNIFICANT CHANGE UP (ref 3.5–5)
PROT SERPL-MCNC: 7.9 G/DL — SIGNIFICANT CHANGE UP (ref 6–8)
PROTHROM AB SERPL-ACNC: 12.4 SEC — SIGNIFICANT CHANGE UP (ref 9.95–12.87)
RBC # BLD: 5.46 M/UL — SIGNIFICANT CHANGE UP (ref 4.7–6.1)
RBC # FLD: 14.7 % — HIGH (ref 11.5–14.5)
SODIUM SERPL-SCNC: 134 MMOL/L — LOW (ref 135–146)
WBC # BLD: 8.61 K/UL — SIGNIFICANT CHANGE UP (ref 4.8–10.8)
WBC # FLD AUTO: 8.61 K/UL — SIGNIFICANT CHANGE UP (ref 4.8–10.8)

## 2023-04-13 PROCEDURE — 36415 COLL VENOUS BLD VENIPUNCTURE: CPT

## 2023-04-13 PROCEDURE — 85730 THROMBOPLASTIN TIME PARTIAL: CPT

## 2023-04-13 PROCEDURE — 99214 OFFICE O/P EST MOD 30 MIN: CPT | Mod: 25

## 2023-04-13 PROCEDURE — 93005 ELECTROCARDIOGRAM TRACING: CPT

## 2023-04-13 PROCEDURE — 93010 ELECTROCARDIOGRAM REPORT: CPT

## 2023-04-13 PROCEDURE — 85025 COMPLETE CBC W/AUTO DIFF WBC: CPT

## 2023-04-13 PROCEDURE — 80053 COMPREHEN METABOLIC PANEL: CPT

## 2023-04-13 PROCEDURE — 85610 PROTHROMBIN TIME: CPT

## 2023-04-13 RX ORDER — AMIODARONE HYDROCHLORIDE 400 MG/1
1 TABLET ORAL
Refills: 0 | DISCHARGE

## 2023-04-13 NOTE — H&P PST ADULT - HISTORY OF PRESENT ILLNESS
61 Y/O MALE PT TO PAST WITH HX              PT NOW FOR SCHEDULED PROCEDURE. PT DENIES ANY CP SOB PALP COUGH DYSURIA FEVER URI. PT ABLE TO NOÉ 1-2 FOS W/O SOB  pt denies any covid s/s, or tested positive in the past  pt advised self quarantine till day of procedure  Anesthesia Alert  NO--Difficult Airway  NO--History of neck surgery or radiation  NO--Limited ROM of neck  NO--History of Malignant hyperthermia  NO--Personal or family history of Pseudocholinesterase deficiency.  NO--Prior Anesthesia Complication  NO--Latex Allergy  NO--Loose teeth  NO--History of Rheumatoid Arthritis  NO--SANJU  NO--Bleeding risk  NO--Other_____   63 Y/O MALE PT TO PAST WITH HX A-FIB PT C/O IRREG HR AND WORSEING SANJU FOR PAST 2 MO  PT NOW FOR SCHEDULED PROCEDURE ( GABINO) ,   PT DENIES ANY CP SOB PALP COUGH DYSURIA FEVER URI AT PRESENTS . PT ABLE TO NOÉ 1-2 FOS W/O SOB  pt denies any covid s/s, or tested positive in the past  pt advised self quarantine till day of procedure  Anesthesia Alert  CLASS IV  SUSPECT NEVER TESTED- NARROW , CROWDED AIRWAY  NO--History of neck surgery or radiation  NO--Limited ROM of neck  NO--History of Malignant hyperthermia  NO--Personal or family history of Pseudocholinesterase deficiency.  NO--Prior Anesthesia Complication  NO--Latex Allergy  NO--Loose teeth  NO--History of Rheumatoid Arthritis  NO--SANJU- SUSPECT  NO--Bleeding risk  NO--Other_____

## 2023-04-13 NOTE — H&P PST ADULT - NSICDXFAMILYHX_GEN_ALL_CORE_FT
FAMILY HISTORY:  Father  Still living? Unknown  FH: lung cancer, Age at diagnosis: Age Unknown    Mother  Still living? Unknown  FH: pancreatic cancer, Age at diagnosis: Age Unknown

## 2023-04-13 NOTE — H&P PST ADULT - NSICDXPASTMEDICALHX_GEN_ALL_CORE_FT
PAST MEDICAL HISTORY:  Atrial fibrillation     BPH (benign prostatic hyperplasia)     GERD (gastroesophageal reflux disease)     HTN (hypertension)

## 2023-04-14 DIAGNOSIS — I48.19 OTHER PERSISTENT ATRIAL FIBRILLATION: ICD-10-CM

## 2023-04-14 DIAGNOSIS — Z01.818 ENCOUNTER FOR OTHER PREPROCEDURAL EXAMINATION: ICD-10-CM

## 2023-04-17 ENCOUNTER — LABORATORY RESULT (OUTPATIENT)
Age: 63
End: 2023-04-17

## 2023-04-17 ENCOUNTER — NON-APPOINTMENT (OUTPATIENT)
Age: 63
End: 2023-04-17

## 2023-04-18 ENCOUNTER — APPOINTMENT (OUTPATIENT)
Dept: UROLOGY | Facility: CLINIC | Age: 63
End: 2023-04-18
Payer: COMMERCIAL

## 2023-04-18 VITALS
BODY MASS INDEX: 34.1 KG/M2 | HEART RATE: 53 BPM | SYSTOLIC BLOOD PRESSURE: 170 MMHG | DIASTOLIC BLOOD PRESSURE: 84 MMHG | HEIGHT: 68 IN | WEIGHT: 225 LBS

## 2023-04-18 PROCEDURE — 99214 OFFICE O/P EST MOD 30 MIN: CPT

## 2023-04-18 NOTE — ASSESSMENT
[FreeTextEntry1] : JAMES BEHAN is a 62 year old male who presents for consultation for right sided UPJ obstruction.\par Probable crossing vessel. Good parenchyma bilaterally.\par \par This may have been evaluated with a VCUG when patient was a child.\par Also hydro present on ultrasound 2018.\par Given stability and overall minimally symptomatic we discussed options such as right robotic pyeloplasty, endoscopic management versus observation and patient prefers observation.\par Patient understands risk such as renal deterioration and sepsis.\par \par Plan \par - RBUS , NM renal scan + PSA . F/u in 6 months to discuss .

## 2023-04-18 NOTE — ADDENDUM
[FreeTextEntry1] : Patient's note was transcribed with the assistance of a medical scribe under the supervision of Dr. Grewal.\par I, Dr. Grewal, have reviewed the patient's chart and agree that it aligns with my medical decisions.\par Amaya Luong, our scribe, also served as a chaperone for physical examination purposes.\par \par \par

## 2023-04-19 ENCOUNTER — FORM ENCOUNTER (OUTPATIENT)
Age: 63
End: 2023-04-19

## 2023-04-19 PROBLEM — N40.0 BENIGN PROSTATIC HYPERPLASIA WITHOUT LOWER URINARY TRACT SYMPTOMS: Chronic | Status: ACTIVE | Noted: 2023-04-13

## 2023-04-19 PROBLEM — K21.9 GASTRO-ESOPHAGEAL REFLUX DISEASE WITHOUT ESOPHAGITIS: Chronic | Status: ACTIVE | Noted: 2023-04-13

## 2023-04-19 PROBLEM — I48.91 UNSPECIFIED ATRIAL FIBRILLATION: Chronic | Status: ACTIVE | Noted: 2023-04-13

## 2023-04-19 PROBLEM — I10 ESSENTIAL (PRIMARY) HYPERTENSION: Chronic | Status: ACTIVE | Noted: 2023-04-13

## 2023-04-20 ENCOUNTER — OUTPATIENT (OUTPATIENT)
Dept: INPATIENT UNIT | Facility: HOSPITAL | Age: 63
LOS: 1 days | Discharge: HOME | End: 2023-04-20
Payer: COMMERCIAL

## 2023-04-20 DIAGNOSIS — I48.92 UNSPECIFIED ATRIAL FLUTTER: ICD-10-CM

## 2023-04-20 DIAGNOSIS — Z98.890 OTHER SPECIFIED POSTPROCEDURAL STATES: Chronic | ICD-10-CM

## 2023-04-20 DIAGNOSIS — I48.19 OTHER PERSISTENT ATRIAL FIBRILLATION: ICD-10-CM

## 2023-04-20 PROCEDURE — 93005 ELECTROCARDIOGRAM TRACING: CPT

## 2023-04-20 PROCEDURE — 93312 ECHO TRANSESOPHAGEAL: CPT | Mod: 26,XU

## 2023-04-20 PROCEDURE — 93325 DOPPLER ECHO COLOR FLOW MAPG: CPT

## 2023-04-20 PROCEDURE — 93320 DOPPLER ECHO COMPLETE: CPT | Mod: 26

## 2023-04-20 PROCEDURE — 93320 DOPPLER ECHO COMPLETE: CPT

## 2023-04-20 PROCEDURE — 93010 ELECTROCARDIOGRAM REPORT: CPT

## 2023-04-20 PROCEDURE — 93312 ECHO TRANSESOPHAGEAL: CPT

## 2023-04-20 PROCEDURE — 93325 DOPPLER ECHO COLOR FLOW MAPG: CPT | Mod: 26

## 2023-04-20 PROCEDURE — 92960 CARDIOVERSION ELECTRIC EXT: CPT

## 2023-04-20 RX ORDER — APIXABAN 2.5 MG/1
1 TABLET, FILM COATED ORAL
Qty: 180 | Refills: 1
Start: 2023-04-20 | End: 2023-10-16

## 2023-04-20 NOTE — CHART NOTE - NSCHARTNOTEFT_GEN_A_CORE
POST OPERATIVE PROCEDURAL DOCUMENTATION  PRE-OP DIAGNOSIS: Atrial flutter    POST-OP DIAGNOSIS: No thrombus; successful cardioversion to normal sinus rhythm    PROCEDURE: Transesophageal echocardiogram    Primary Physician: Dr. Falk  Fellow: Dr. Marion    ANESTHESIA TYPE  [  ] General Anesthesia  [ x ] Conscious Sedation  [  ] Local/Regional    CONDITION  [  ] Critical  [  ] Serious  [  ] Fair  [X] Good    SPECIMENS REMOVED (IF APPLICABLE): N/A    IMPLANTS (IF APPLICABLE): None    ESTIMATED BLOOD LOSS: None    COMPLICATIONS: None      FINDINGS:    After risks and benefits of procedures were explained, informed consent was obtained and placed in chart. Refer to Anesthesia note for sedation details.  The GABINO probe was passed into the esophagus without difficulty.  Transesophageal and transgastric images were obtained.  The GABINO probe was removed without difficulty and examined.  There was no evidence for bleeding.  The patient tolerated the procedure well without any immediate GABINO-related complications.      Preliminary Findings:  LA: Mildly enlarged enlarged  DEANN: Left atrial appendage was clear of clot and smoke.  LV: LVEF was estimated to be normal  MV: Trace MR  AV: No evidence of AI, no evidence of AS.   RA: Mildly enlarged  TV: Trace TR  PV: no PI.   IAS: no PFO. No R-> L shunt by color doppler  There was mild, non-mobile atheroma seen in the thoracic aorta.     Patient successfully converted to sinus rhythm with synchronized  200J of direct current cardioversion.    DIAGNOSIS/IMPRESSION:  - No thrombus; successful cardioversion to normal sinus rhythm    PLAN OF CARE:  - D/C home today  - Continue anticoagulation for stroke prevention  - Outpatient follow-up  - EP follow-up POST OPERATIVE PROCEDURAL DOCUMENTATION  PRE-OP DIAGNOSIS: Atrial flutter    POST-OP DIAGNOSIS: No thrombus; successful cardioversion to normal sinus rhythm    PROCEDURE: Transesophageal echocardiogram    Primary Physician: Dr. Falk  Fellow: Dr. Marion    ANESTHESIA TYPE  [  ] General Anesthesia  [ x ] Conscious Sedation  [  ] Local/Regional    CONDITION  [  ] Critical  [  ] Serious  [  ] Fair  [X] Good    SPECIMENS REMOVED (IF APPLICABLE): N/A    IMPLANTS (IF APPLICABLE): None    ESTIMATED BLOOD LOSS: None    COMPLICATIONS: None      FINDINGS:    After risks and benefits of procedures were explained, informed consent was obtained and placed in chart. Refer to Anesthesia note for sedation details.  The GABINO probe was passed into the esophagus without difficulty.  Transesophageal and transgastric images were obtained.  The GABINO probe was removed without difficulty and examined.  There was no evidence for bleeding.  The patient tolerated the procedure well without any immediate GABINO-related complications.      Preliminary Findings:  LA: Mildly enlarged   DEANN: Left atrial appendage was clear of clot and smoke.  LV: LVEF was estimated to be normal  MV: Trace MR  AV: No evidence of AI, no evidence of AS.   RA: Mildly enlarged  TV: Trace TR  PV: no PI.   IAS: no PFO. No R-> L shunt by color doppler  There was mild, non-mobile atheroma seen in the thoracic aorta.     Patient successfully converted to sinus rhythm with synchronized  200J of direct current cardioversion.    DIAGNOSIS/IMPRESSION:  - No thrombus; successful cardioversion to normal sinus rhythm    PLAN OF CARE:  - D/C home today  - Continue anticoagulation for stroke prevention  - Outpatient follow-up  - EP follow-up

## 2023-04-20 NOTE — PRE-ANESTHESIA EVALUATION ADULT - NSRADCARDRESULTSFT_GEN_ALL_CORE
TTE 3/10/23  Summary:   1. Moderately decreased global left ventricular systolic function with   mild concentric left ventricular hypertrophy.. Left ventricular ejection   fraction, by visual estimation, is 35 to 40%.   2. Endocardial visualization was enhancedwith intravenous echo   contrast. There is no evidence of LV thrombus.   3. Diastolic function could not be assessed in this study due to atrial   arrhythmia.   4. Normal right ventricular size with moderately reduced systolic   function.   5. Mildlyenlarged left atrium.   6. No hemodynamically significant valvular disease.   7. Mild pulmonary hypertension (PASP = 42mmHg).   8. No pericardial effusion.   9. NB: patient was in rapid atrial fibrillation during this exam.

## 2023-04-24 ENCOUNTER — APPOINTMENT (OUTPATIENT)
Dept: PULMONOLOGY | Facility: CLINIC | Age: 63
End: 2023-04-24
Payer: COMMERCIAL

## 2023-04-24 VITALS
WEIGHT: 235 LBS | OXYGEN SATURATION: 95 % | SYSTOLIC BLOOD PRESSURE: 120 MMHG | RESPIRATION RATE: 14 BRPM | BODY MASS INDEX: 34.8 KG/M2 | HEIGHT: 69 IN | HEART RATE: 92 BPM | DIASTOLIC BLOOD PRESSURE: 80 MMHG

## 2023-04-24 PROCEDURE — 99203 OFFICE O/P NEW LOW 30 MIN: CPT

## 2023-04-24 NOTE — REASON FOR VISIT
[Initial] : an initial visit [Sleep Evaluation] : sleep evaluation [Sleep Apnea] : sleep apnea [COPD] : COPD

## 2023-04-24 NOTE — HISTORY OF PRESENT ILLNESS
[TextBox_4] : 62-year-old man with history of hypertension, recent diagnosis with paroxysmal A-fib status post GABINO guided cardioversion, on anticoagulation with Eliquis, history of epistaxis, is known to snore, has witnessed apneas, tiredness and fatigue in the daytime, and a high likelihood of SANJU presenting for evaluation.  He is also an active smoker of more than 20 pack years.  He is not interested in smoking cessation on fluoroscopy or low-dose CT for lung cancer screening.  He has some dyspnea occasionally on exertion but is not currently i interested in getting PFTs.

## 2023-04-26 ENCOUNTER — NON-APPOINTMENT (OUTPATIENT)
Age: 63
End: 2023-04-26

## 2023-04-26 LAB
BILIRUB UR QL STRIP: NORMAL
COLLECTION METHOD: NORMAL
GLUCOSE UR-MCNC: 500
HCG UR QL: 0.2 EU/DL
HGB UR QL STRIP.AUTO: NORMAL
KETONES UR-MCNC: NORMAL
LEUKOCYTE ESTERASE UR QL STRIP: NORMAL
NITRITE UR QL STRIP: NORMAL
PH UR STRIP: 6.5
PROT UR STRIP-MCNC: NORMAL
SP GR UR STRIP: 1.01
URINE CYTOLOGY: NORMAL

## 2023-05-10 ENCOUNTER — APPOINTMENT (OUTPATIENT)
Dept: ELECTROPHYSIOLOGY | Facility: CLINIC | Age: 63
End: 2023-05-10
Payer: COMMERCIAL

## 2023-05-10 VITALS
HEART RATE: 49 BPM | SYSTOLIC BLOOD PRESSURE: 160 MMHG | DIASTOLIC BLOOD PRESSURE: 78 MMHG | BODY MASS INDEX: 33.77 KG/M2 | TEMPERATURE: 97.6 F | RESPIRATION RATE: 16 BRPM | WEIGHT: 228 LBS | HEIGHT: 69 IN

## 2023-05-10 DIAGNOSIS — R21 RASH AND OTHER NONSPECIFIC SKIN ERUPTION: ICD-10-CM

## 2023-05-10 PROCEDURE — 99215 OFFICE O/P EST HI 40 MIN: CPT

## 2023-05-10 PROCEDURE — 93000 ELECTROCARDIOGRAM COMPLETE: CPT

## 2023-05-10 RX ORDER — PANTOPRAZOLE 40 MG/1
40 TABLET, DELAYED RELEASE ORAL
Qty: 14 | Refills: 0 | Status: COMPLETED | COMMUNITY
Start: 2023-03-16 | End: 2023-05-10

## 2023-05-10 NOTE — HISTORY OF PRESENT ILLNESS
[FreeTextEntry1] : \par Cardiologist: Dr. Falk\par \par 63 yo M with history of epistaxis, HTN, palpitations and recent diagnosis of paroxysmal AF/AFl with RVR. He received amiodarone drip, s/p GABINO and cardioversion 3/10 and two shocks failed. He was started on Eliquis no s/sx of bleeding reported. Pt was diagnosed with acute HFrEF, cardiomegaly, TTE EF 35-40%. \par \par He underwent GABINO/CV after Amio load on 4/20/23 and successfully converted to NSR. Patient states he feels well. Developed a rash. Denies chest pain, palpitations, dyspnea and edema. Still smokes 6 cigs/ day. TFTs are abnormal and pt has endocrine appt scheduled.

## 2023-05-10 NOTE — ASSESSMENT
[FreeTextEntry1] : # Paroxysmal AFib/AFlutter\par - Early return to AF after cardioversion. Patient was loaded with Amio and then successfully cardioverted to NSR. Doing well. \par - TFTs abnormal and pt developed a rash. ? C/f drug rash. Derm referral. Stop Amio. Rec EPS and ablation for AF/AFlutter, for which pt is agreeable. Will schedule AF Cryo and AFL RF 5/19. \par - Cont Eliquis 5mg PO BID given recent cardioversion and CHADS VASc of 1 (HTN. \par - Cont Metoprolol. \par - Pt has pulm referral and appt for SANJU eval. \par \par # HTN\par - BP elevated\par - Cont Metoprolol  mg and Losartan/HCTZ 100/12.5 mg\par - 2g Na diet enforced\par - Follow up with cardio\par \par # Tobacco Use\par - Strongly advised pt to stop smoking. Discussed AF risk factors and also d/w pt use of meds or nicotine replacement to help with tobacco cessation. Spent 4-5 min discussing tobacco cessation.\par \par I have also advised the patient to go to the nearest emergency room if he experiences any chest pain, dyspnea, syncope, or has any other compelling symptoms.\par \par Follow up in 6 weeks\par

## 2023-05-10 NOTE — DISCUSSION/SUMMARY
[FreeTextEntry1] : We had an extensive conversation regarding the nature of atrial fibrillation, including potential etiologies, underlying pathophysiology and natural history of the disease. In addition, the potential risk of thromboembolic events and assessment of that risk were discussed. I have emphasized the importance of continuing anticoagulation. \par \par In addition, the maintenance of sinus rhythm along with adjuvant antiarrhythmic agents and catheter ablation therapy were discussed. The rationale for and risks of ablation therapy were discussed, including but not limited to bleeding, vascular injury, groin complications, cardiac perforation and tamponade, stroke, esophageal injury, pulmonary vein stenosis, need for pacemaker, need for cardiac surgery, and death. In addition, the long-term and ongoing nature of this therapy were also discussed, including the critical role of continued monitoring post-ablation and the potential for the necessity of repeat ablation procedures to definitively treat the condition. \par \par The patient verbalized understanding of the discussion and all questions were addressed and answered. The patient would like to proceed with ablation. [EKG obtained to assist in diagnosis and management of assessed problem(s)] : EKG obtained to assist in diagnosis and management of assessed problem(s)

## 2023-05-10 NOTE — CARDIOLOGY SUMMARY
[de-identified] : 5/10/2023 Sinus luis miguel (HR 49 bpm), RBBB, QTc 490 msec\par 3/29/2023 AFlutter with RVR ( bpm) [de-identified] : GABINO/CV 4/20/2023 Normal global LVSF. Mild LAE. Successful cardioversion to NSR with 200J DCCV\par \par TTE 3/10/2023\par  1. Moderately decreased global left ventricular systolic function with mild concentric LVEF, by visual estimation, is 35 to 40%.\par  2. Endocardial visualization was enhanced with intravenous echo contrast. There is no evidence of LV thrombus.\par  3. Diastolic function could not be assessed in this study due to atrial arrhythmia.\par  4. Normal right ventricular size with moderately reduced systolic function.\par  5. Mildly enlarged left atrium.\par  6. No hemodynamically significant valvular disease.\par  7. Mild pulmonary hypertension (PASP = 42mmHg).\par  8. No pericardial effusion.\par  9. NB: patient was in rapid atrial fibrillation during this exam.\par \par GABINO  3/10/2023 \par 1. Limited exam due to tenuous respiratory status and desaturation. Exam terminated early due to apnea.\par  2. No left atrial appendage thrombus and normal left atrial appendage velocities.\par  3. Moderately decreased global left ventricular systolic function.\par  4. Mildly enlarged left atrium.\par  5. Unsuccessful conversion after two attempts at 200J of direct current synchronized shock.

## 2023-05-10 NOTE — PHYSICAL EXAM
[Well Developed] : well developed [Well Nourished] : well nourished [No Acute Distress] : no acute distress [Obese] : obese [Normal Conjunctiva] : normal conjunctiva [Normal Venous Pressure] : normal venous pressure [Normal S1, S2] : normal S1, S2 [No Murmur] : no murmur [Clear Lung Fields] : clear lung fields [Good Air Entry] : good air entry [No Respiratory Distress] : no respiratory distress  [Soft] : abdomen soft [Normal Gait] : normal gait [No Edema] : no edema [No Rash] : no rash [Moves all extremities] : moves all extremities [Normal Speech] : normal speech [Alert and Oriented] : alert and oriented

## 2023-05-12 ENCOUNTER — RESULT REVIEW (OUTPATIENT)
Age: 63
End: 2023-05-12

## 2023-05-12 ENCOUNTER — OUTPATIENT (OUTPATIENT)
Dept: OUTPATIENT SERVICES | Facility: HOSPITAL | Age: 63
LOS: 1 days | End: 2023-05-12
Payer: COMMERCIAL

## 2023-05-12 VITALS
WEIGHT: 233.03 LBS | RESPIRATION RATE: 16 BRPM | HEIGHT: 69 IN | TEMPERATURE: 97 F | DIASTOLIC BLOOD PRESSURE: 68 MMHG | OXYGEN SATURATION: 93 % | SYSTOLIC BLOOD PRESSURE: 152 MMHG | HEART RATE: 61 BPM

## 2023-05-12 DIAGNOSIS — I48.19 OTHER PERSISTENT ATRIAL FIBRILLATION: ICD-10-CM

## 2023-05-12 DIAGNOSIS — Z01.818 ENCOUNTER FOR OTHER PREPROCEDURAL EXAMINATION: ICD-10-CM

## 2023-05-12 DIAGNOSIS — Z98.890 OTHER SPECIFIED POSTPROCEDURAL STATES: Chronic | ICD-10-CM

## 2023-05-12 LAB
ALBUMIN SERPL ELPH-MCNC: 4.9 G/DL — SIGNIFICANT CHANGE UP (ref 3.5–5.2)
ALP SERPL-CCNC: 105 U/L — SIGNIFICANT CHANGE UP (ref 30–115)
ALT FLD-CCNC: 54 U/L — HIGH (ref 0–41)
ANION GAP SERPL CALC-SCNC: 17 MMOL/L — HIGH (ref 7–14)
APPEARANCE UR: CLEAR — SIGNIFICANT CHANGE UP
APTT BLD: 40.7 SEC — HIGH (ref 27–39.2)
AST SERPL-CCNC: 43 U/L — HIGH (ref 0–41)
BACTERIA # UR AUTO: NEGATIVE — SIGNIFICANT CHANGE UP
BASOPHILS # BLD AUTO: 0.05 K/UL — SIGNIFICANT CHANGE UP (ref 0–0.2)
BASOPHILS NFR BLD AUTO: 0.6 % — SIGNIFICANT CHANGE UP (ref 0–1)
BILIRUB SERPL-MCNC: 0.5 MG/DL — SIGNIFICANT CHANGE UP (ref 0.2–1.2)
BILIRUB UR-MCNC: NEGATIVE — SIGNIFICANT CHANGE UP
BUN SERPL-MCNC: 23 MG/DL — HIGH (ref 10–20)
CALCIUM SERPL-MCNC: 9.6 MG/DL — SIGNIFICANT CHANGE UP (ref 8.4–10.5)
CHLORIDE SERPL-SCNC: 95 MMOL/L — LOW (ref 98–110)
CO2 SERPL-SCNC: 24 MMOL/L — SIGNIFICANT CHANGE UP (ref 17–32)
COLOR SPEC: SIGNIFICANT CHANGE UP
CREAT SERPL-MCNC: 1.4 MG/DL — SIGNIFICANT CHANGE UP (ref 0.7–1.5)
DIFF PNL FLD: NEGATIVE — SIGNIFICANT CHANGE UP
EGFR: 57 ML/MIN/1.73M2 — LOW
EOSINOPHIL # BLD AUTO: 0.06 K/UL — SIGNIFICANT CHANGE UP (ref 0–0.7)
EOSINOPHIL NFR BLD AUTO: 0.7 % — SIGNIFICANT CHANGE UP (ref 0–8)
EPI CELLS # UR: 0 /HPF — SIGNIFICANT CHANGE UP (ref 0–5)
GLUCOSE SERPL-MCNC: 87 MG/DL — SIGNIFICANT CHANGE UP (ref 70–99)
GLUCOSE UR QL: ABNORMAL
HCT VFR BLD CALC: 54.5 % — HIGH (ref 42–52)
HGB BLD-MCNC: 19.2 G/DL — CRITICAL HIGH (ref 14–18)
HYALINE CASTS # UR AUTO: 0 /LPF — SIGNIFICANT CHANGE UP (ref 0–7)
IMM GRANULOCYTES NFR BLD AUTO: 0.6 % — HIGH (ref 0.1–0.3)
INR BLD: 1.08 RATIO — SIGNIFICANT CHANGE UP (ref 0.65–1.3)
KETONES UR-MCNC: NEGATIVE — SIGNIFICANT CHANGE UP
LEUKOCYTE ESTERASE UR-ACNC: NEGATIVE — SIGNIFICANT CHANGE UP
LYMPHOCYTES # BLD AUTO: 1.22 K/UL — SIGNIFICANT CHANGE UP (ref 1.2–3.4)
LYMPHOCYTES # BLD AUTO: 14.4 % — LOW (ref 20.5–51.1)
MCHC RBC-ENTMCNC: 34.8 PG — HIGH (ref 27–31)
MCHC RBC-ENTMCNC: 35.2 G/DL — SIGNIFICANT CHANGE UP (ref 32–37)
MCV RBC AUTO: 98.7 FL — HIGH (ref 80–94)
MONOCYTES # BLD AUTO: 0.91 K/UL — HIGH (ref 0.1–0.6)
MONOCYTES NFR BLD AUTO: 10.7 % — HIGH (ref 1.7–9.3)
NEUTROPHILS # BLD AUTO: 6.2 K/UL — SIGNIFICANT CHANGE UP (ref 1.4–6.5)
NEUTROPHILS NFR BLD AUTO: 73 % — SIGNIFICANT CHANGE UP (ref 42.2–75.2)
NITRITE UR-MCNC: NEGATIVE — SIGNIFICANT CHANGE UP
NRBC # BLD: 0 /100 WBCS — SIGNIFICANT CHANGE UP (ref 0–0)
NT-PROBNP SERPL-SCNC: 463 PG/ML — HIGH (ref 0–300)
PH UR: 6.5 — SIGNIFICANT CHANGE UP (ref 5–8)
PLATELET # BLD AUTO: 202 K/UL — SIGNIFICANT CHANGE UP (ref 130–400)
PMV BLD: 9.8 FL — SIGNIFICANT CHANGE UP (ref 7.4–10.4)
POTASSIUM SERPL-MCNC: 4.2 MMOL/L — SIGNIFICANT CHANGE UP (ref 3.5–5)
POTASSIUM SERPL-SCNC: 4.2 MMOL/L — SIGNIFICANT CHANGE UP (ref 3.5–5)
PROT SERPL-MCNC: 7.9 G/DL — SIGNIFICANT CHANGE UP (ref 6–8)
PROT UR-MCNC: ABNORMAL
PROTHROM AB SERPL-ACNC: 12.3 SEC — SIGNIFICANT CHANGE UP (ref 9.95–12.87)
RBC # BLD: 5.52 M/UL — SIGNIFICANT CHANGE UP (ref 4.7–6.1)
RBC # FLD: 14.6 % — HIGH (ref 11.5–14.5)
RBC CASTS # UR COMP ASSIST: 3 /HPF — SIGNIFICANT CHANGE UP (ref 0–4)
SODIUM SERPL-SCNC: 136 MMOL/L — SIGNIFICANT CHANGE UP (ref 135–146)
SP GR SPEC: 1.01 — SIGNIFICANT CHANGE UP (ref 1.01–1.03)
UROBILINOGEN FLD QL: SIGNIFICANT CHANGE UP
WBC # BLD: 8.49 K/UL — SIGNIFICANT CHANGE UP (ref 4.8–10.8)
WBC # FLD AUTO: 8.49 K/UL — SIGNIFICANT CHANGE UP (ref 4.8–10.8)
WBC UR QL: 0 /HPF — SIGNIFICANT CHANGE UP (ref 0–5)

## 2023-05-12 PROCEDURE — 71046 X-RAY EXAM CHEST 2 VIEWS: CPT | Mod: 26

## 2023-05-12 PROCEDURE — 36415 COLL VENOUS BLD VENIPUNCTURE: CPT

## 2023-05-12 PROCEDURE — 83880 ASSAY OF NATRIURETIC PEPTIDE: CPT

## 2023-05-12 PROCEDURE — 93005 ELECTROCARDIOGRAM TRACING: CPT

## 2023-05-12 PROCEDURE — 93010 ELECTROCARDIOGRAM REPORT: CPT

## 2023-05-12 PROCEDURE — 87086 URINE CULTURE/COLONY COUNT: CPT

## 2023-05-12 PROCEDURE — 81001 URINALYSIS AUTO W/SCOPE: CPT

## 2023-05-12 PROCEDURE — 85025 COMPLETE CBC W/AUTO DIFF WBC: CPT

## 2023-05-12 PROCEDURE — 80053 COMPREHEN METABOLIC PANEL: CPT

## 2023-05-12 PROCEDURE — 85610 PROTHROMBIN TIME: CPT

## 2023-05-12 PROCEDURE — 85730 THROMBOPLASTIN TIME PARTIAL: CPT

## 2023-05-12 PROCEDURE — 99214 OFFICE O/P EST MOD 30 MIN: CPT | Mod: 25

## 2023-05-12 PROCEDURE — 71046 X-RAY EXAM CHEST 2 VIEWS: CPT

## 2023-05-12 RX ORDER — AMIODARONE HYDROCHLORIDE 400 MG/1
1 TABLET ORAL
Refills: 0 | DISCHARGE

## 2023-05-12 NOTE — H&P PST ADULT - REASON FOR ADMISSION
Patient is a __62___ year old ___male presenting to [facility] in preparation for __EP study AFIB/aflutter RF; mapping; afib ablation/aflutter RF with cryo; TEE____ on _____05/19/23_ under ___general____ anesthesia by  ____Carlos Enrique_____ .

## 2023-05-12 NOTE — H&P PST ADULT - HISTORY OF PRESENT ILLNESS
CC: pt went for a nose bleed to ER march 2023 and they found him to have irregular HR (AFIB); they tried cardioversion twice didn't work; on eliquis; EF is 35-40%    Mallamapti: 4    FOS: 1-2    pt DIAGNOSED WITH sleep apnea; going to get cpap    Anesthesia Alert  mallampati 4--Difficult Airway  NO--History of neck surgery or radiation  NO--Limited ROM of neck  NO--History of Malignant hyperthermia  NO--Personal or family history of Pseudocholinesterase deficiency.  NO--Prior Anesthesia Complication  NO--Latex Allergy  NO--Loose teeth  NO--History of Rheumatoid Arthritis  yes--SANJU  on eliquis--Bleeding risk  NO--Other_____   CC: pt went for a nose bleed to ER march 2023 and they found him to have irregular HR (AFIB); they tried cardioversion twice didn't work; on eliquis; EF is 35-40%    Patient's amiodarone has been stopped right now because his thyroid levels were abnormal, per pt's fiancee; sleeping pill also stopped  Mallamapti: 4    FOS: 1-2    pt DIAGNOSED WITH sleep apnea; going to get cpap    Anesthesia Alert  mallampati 4--Difficult Airway  NO--History of neck surgery or radiation  NO--Limited ROM of neck  NO--History of Malignant hyperthermia  NO--Personal or family history of Pseudocholinesterase deficiency.  NO--Prior Anesthesia Complication  NO--Latex Allergy  NO--Loose teeth  NO--History of Rheumatoid Arthritis  yes--SANJU  on eliquis--Bleeding risk  NO--Other_____

## 2023-05-12 NOTE — H&P PST ADULT - NSICDXPASTMEDICALHX_GEN_ALL_CORE_FT
PAST MEDICAL HISTORY:  Atrial fibrillation     BPH (benign prostatic hyperplasia)     Epistaxis     GERD (gastroesophageal reflux disease)     HTN (hypertension)

## 2023-05-13 DIAGNOSIS — Z01.818 ENCOUNTER FOR OTHER PREPROCEDURAL EXAMINATION: ICD-10-CM

## 2023-05-13 DIAGNOSIS — I48.19 OTHER PERSISTENT ATRIAL FIBRILLATION: ICD-10-CM

## 2023-05-13 LAB
CULTURE RESULTS: SIGNIFICANT CHANGE UP
SPECIMEN SOURCE: SIGNIFICANT CHANGE UP

## 2023-05-19 ENCOUNTER — APPOINTMENT (OUTPATIENT)
Dept: ELECTROPHYSIOLOGY | Facility: HOSPITAL | Age: 63
End: 2023-05-19

## 2023-05-19 ENCOUNTER — INPATIENT (INPATIENT)
Facility: HOSPITAL | Age: 63
LOS: 0 days | Discharge: HOME | DRG: 274 | End: 2023-05-20
Attending: STUDENT IN AN ORGANIZED HEALTH CARE EDUCATION/TRAINING PROGRAM | Admitting: STUDENT IN AN ORGANIZED HEALTH CARE EDUCATION/TRAINING PROGRAM
Payer: COMMERCIAL

## 2023-05-19 ENCOUNTER — TRANSCRIPTION ENCOUNTER (OUTPATIENT)
Age: 63
End: 2023-05-19

## 2023-05-19 VITALS — HEIGHT: 69 IN | WEIGHT: 229.94 LBS

## 2023-05-19 DIAGNOSIS — Z98.890 OTHER SPECIFIED POSTPROCEDURAL STATES: Chronic | ICD-10-CM

## 2023-05-19 DIAGNOSIS — R09.02 HYPOXEMIA: ICD-10-CM

## 2023-05-19 DIAGNOSIS — I42.9 CARDIOMYOPATHY, UNSPECIFIED: ICD-10-CM

## 2023-05-19 DIAGNOSIS — I11.0 HYPERTENSIVE HEART DISEASE WITH HEART FAILURE: ICD-10-CM

## 2023-05-19 DIAGNOSIS — I48.0 PAROXYSMAL ATRIAL FIBRILLATION: ICD-10-CM

## 2023-05-19 DIAGNOSIS — I48.3 TYPICAL ATRIAL FLUTTER: ICD-10-CM

## 2023-05-19 DIAGNOSIS — I48.91 UNSPECIFIED ATRIAL FIBRILLATION: ICD-10-CM

## 2023-05-19 DIAGNOSIS — I47.1 SUPRAVENTRICULAR TACHYCARDIA: ICD-10-CM

## 2023-05-19 DIAGNOSIS — H10.9 UNSPECIFIED CONJUNCTIVITIS: ICD-10-CM

## 2023-05-19 DIAGNOSIS — Z79.01 LONG TERM (CURRENT) USE OF ANTICOAGULANTS: ICD-10-CM

## 2023-05-19 DIAGNOSIS — I48.19 OTHER PERSISTENT ATRIAL FIBRILLATION: ICD-10-CM

## 2023-05-19 DIAGNOSIS — I50.22 CHRONIC SYSTOLIC (CONGESTIVE) HEART FAILURE: ICD-10-CM

## 2023-05-19 PROCEDURE — 93320 DOPPLER ECHO COMPLETE: CPT | Mod: 26

## 2023-05-19 PROCEDURE — 93655 ICAR CATH ABLTJ DSCRT ARRHYT: CPT

## 2023-05-19 PROCEDURE — 71045 X-RAY EXAM CHEST 1 VIEW: CPT

## 2023-05-19 PROCEDURE — 93656 COMPRE EP EVAL ABLTJ ATR FIB: CPT

## 2023-05-19 PROCEDURE — 36415 COLL VENOUS BLD VENIPUNCTURE: CPT

## 2023-05-19 PROCEDURE — 93312 ECHO TRANSESOPHAGEAL: CPT | Mod: 26

## 2023-05-19 PROCEDURE — 85027 COMPLETE CBC AUTOMATED: CPT

## 2023-05-19 PROCEDURE — 76937 US GUIDE VASCULAR ACCESS: CPT | Mod: 26

## 2023-05-19 PROCEDURE — 83735 ASSAY OF MAGNESIUM: CPT

## 2023-05-19 PROCEDURE — 93325 DOPPLER ECHO COLOR FLOW MAPG: CPT | Mod: 26

## 2023-05-19 PROCEDURE — 80048 BASIC METABOLIC PNL TOTAL CA: CPT

## 2023-05-19 PROCEDURE — 93005 ELECTROCARDIOGRAM TRACING: CPT

## 2023-05-19 RX ORDER — FAMOTIDINE 10 MG/ML
20 INJECTION INTRAVENOUS DAILY
Refills: 0 | Status: DISCONTINUED | OUTPATIENT
Start: 2023-05-19 | End: 2023-05-19

## 2023-05-19 RX ORDER — ACETAMINOPHEN 500 MG
650 TABLET ORAL EVERY 6 HOURS
Refills: 0 | Status: DISCONTINUED | OUTPATIENT
Start: 2023-05-19 | End: 2023-05-20

## 2023-05-19 RX ORDER — FAMOTIDINE 10 MG/ML
20 INJECTION INTRAVENOUS DAILY
Refills: 0 | Status: DISCONTINUED | OUTPATIENT
Start: 2023-05-19 | End: 2023-05-20

## 2023-05-19 RX ORDER — APIXABAN 2.5 MG/1
5 TABLET, FILM COATED ORAL
Refills: 0 | Status: DISCONTINUED | OUTPATIENT
Start: 2023-05-19 | End: 2023-05-20

## 2023-05-19 RX ORDER — LOSARTAN POTASSIUM 100 MG/1
25 TABLET, FILM COATED ORAL DAILY
Refills: 0 | Status: DISCONTINUED | OUTPATIENT
Start: 2023-05-19 | End: 2023-05-20

## 2023-05-19 RX ORDER — METOPROLOL TARTRATE 50 MG
1 TABLET ORAL
Refills: 0 | DISCHARGE

## 2023-05-19 RX ORDER — TAMSULOSIN HYDROCHLORIDE 0.4 MG/1
1 CAPSULE ORAL
Refills: 0 | DISCHARGE

## 2023-05-19 RX ORDER — TAMSULOSIN HYDROCHLORIDE 0.4 MG/1
0.4 CAPSULE ORAL AT BEDTIME
Refills: 0 | Status: DISCONTINUED | OUTPATIENT
Start: 2023-05-19 | End: 2023-05-20

## 2023-05-19 RX ORDER — LANOLIN ALCOHOL/MO/W.PET/CERES
5 CREAM (GRAM) TOPICAL AT BEDTIME
Refills: 0 | Status: DISCONTINUED | OUTPATIENT
Start: 2023-05-19 | End: 2023-05-20

## 2023-05-19 RX ORDER — METOPROLOL TARTRATE 50 MG
100 TABLET ORAL
Refills: 0 | Status: DISCONTINUED | OUTPATIENT
Start: 2023-05-19 | End: 2023-05-20

## 2023-05-19 RX ORDER — DAPAGLIFLOZIN 10 MG/1
10 TABLET, FILM COATED ORAL DAILY
Refills: 0 | Status: DISCONTINUED | OUTPATIENT
Start: 2023-05-19 | End: 2023-05-20

## 2023-05-19 RX ADMIN — Medication 100 MILLIGRAM(S): at 17:47

## 2023-05-19 RX ADMIN — Medication 5 MILLIGRAM(S): at 21:22

## 2023-05-19 RX ADMIN — DAPAGLIFLOZIN 10 MILLIGRAM(S): 10 TABLET, FILM COATED ORAL at 21:22

## 2023-05-19 RX ADMIN — Medication 600 MILLIGRAM(S): at 21:22

## 2023-05-19 RX ADMIN — LOSARTAN POTASSIUM 25 MILLIGRAM(S): 100 TABLET, FILM COATED ORAL at 21:22

## 2023-05-19 RX ADMIN — TAMSULOSIN HYDROCHLORIDE 0.4 MILLIGRAM(S): 0.4 CAPSULE ORAL at 21:22

## 2023-05-19 RX ADMIN — FAMOTIDINE 20 MILLIGRAM(S): 10 INJECTION INTRAVENOUS at 21:22

## 2023-05-19 RX ADMIN — APIXABAN 5 MILLIGRAM(S): 2.5 TABLET, FILM COATED ORAL at 17:47

## 2023-05-19 NOTE — DISCHARGE NOTE PROVIDER - NSDCFUSCHEDAPPT_GEN_ALL_CORE_FT
Jasmine Monaco  [facility]  ELECTROPH [address]  Scheduled Appointment: 06/21/2023    Prashant Russ  [facility] PreAdmits  Scheduled Appointment: 06/22/2023    [facility]  ENDOCRIN [address]  Scheduled Appointment: 06/22/2023    Adria Kendall  [facility]  PULMMED [address]  Scheduled Appointment: 06/26/2023    Mickey Falk  [facility]  CARDIOLOGY [address]  Scheduled Appointment: 06/26/2023    Tonny Larios  [facility] PreAdmits  Scheduled Appointment: 07/25/2023    [facility]  SLEEPLAB  [address]  Scheduled Appointment: 07/25/2023

## 2023-05-19 NOTE — DISCHARGE NOTE PROVIDER - CARE PROVIDER_API CALL
Jasmine Monaco)  Cardiac Electrophysiology; Cardiovascular Disease; Internal Medicine  [address] [place] [address], [address]  [place], [place] [id number]  Phone: [phone]  Fax: [phone]  Scheduled Appointment: 06/21/2023

## 2023-05-19 NOTE — DISCHARGE NOTE PROVIDER - NSDCMRMEDTOKEN_GEN_ALL_CORE_FT
apixaban 5 mg oral tablet: 1 tab(s) orally every 12 hours  dapagliflozin 10 mg oral tablet: 1 tab(s) orally every 24 hours  Lasix 20 mg oral tablet: 1 tab(s) orally every other day, As Needed for Lower leg swelling.  losartan 25 mg oral tablet: 1 orally  metoprolol tartrate 100 mg oral tablet: 1 orally 2 times a day  tamsulosin 0.4 mg oral capsule: 1 orally 2 times a day   acetaminophen 325 mg oral tablet: 2 tab(s) orally every 6 hours As needed Mild Pain (1 - 3)  apixaban 5 mg oral tablet: 1 tab(s) orally every 12 hours  dapagliflozin 10 mg oral tablet: 1 tab(s) orally every 24 hours  famotidine 20 mg oral tablet: 1 tab(s) orally once a day  ketotifen 0.025% ophthalmic solution: 1 drop(s) to each affected eye 2 times a day  Lasix 20 mg oral tablet: 1 tab(s) orally every other day, As Needed for Lower leg swelling.  losartan 50 mg oral tablet: 1 tab(s) orally once a day  metoprolol tartrate 100 mg oral tablet: 1 orally 2 times a day  ocular lubricant ophthalmic solution: 1 drop(s) to each affected eye once a day As needed Dry Eyes  tamsulosin 0.4 mg oral capsule: 1 orally 2 times a day   acetaminophen 325 mg oral tablet: 2 tab(s) orally every 6 hours As needed Mild Pain (1 - 3)  apixaban 5 mg oral tablet: 1 tab(s) orally every 12 hours  dapagliflozin 10 mg oral tablet: 1 tab(s) orally every 24 hours  famotidine 20 mg oral tablet: 1 tab(s) orally once a day  ketotifen 0.025% ophthalmic solution: 1 drop(s) to each affected eye 2 times a day  losartan 50 mg oral tablet: 1 tab(s) orally once a day  metoprolol tartrate 100 mg oral tablet: 1 orally 2 times a day  ocular lubricant ophthalmic solution: 1 drop(s) to each affected eye once a day As needed Dry Eyes  tamsulosin 0.4 mg oral capsule: 1 orally 2 times a day

## 2023-05-19 NOTE — DISCHARGE NOTE PROVIDER - NSDCCPTREATMENT_GEN_ALL_CORE_FT
PRINCIPAL PROCEDURE  Procedure: Cardiac ablation  Findings and Treatment:   - Please start taking pantoprazole 40 mg daily (famotidine 20 mg daily) for 30 days. (ABLATION, if not already on pantoprazole)  - You should restart your Eliquis (Xarelto / coumadin )*** on ***. (MICRA)  - You may shower today.  - Do not drive or operate heavy machinery for 3 days.  - Do not submerge in water (example: baths, swimming) for 1 week.  - No squatting, exertional activities, or lifting anything > 10 lbs for 1 week.  - Any sudden swelling, redness, fever, discharge, or severe pain, call the Electrophysiology Office at [phone].     PRINCIPAL PROCEDURE  Procedure: Cardiac ablation  Findings and Treatment: - Please start taking pantoprazole 40 mg daily (famotidine 20 mg daily) for 30 days. (ABLATION, if not already on pantoprazole)  - You should restart your Eliquis   - You may shower today.  - Do not drive or operate heavy machinery for 3 days.  - Do not submerge in water (example: baths, swimming) for 1 week.  - No squatting, exertional activities, or lifting anything > 10 lbs for 1 week.  - Any sudden swelling, redness, fever, discharge, or severe pain, call the Electrophysiology Office at [phone].

## 2023-05-19 NOTE — DISCHARGE NOTE PROVIDER - HOSPITAL COURSE
Patient is a 62y Male  with PMHx of HFrEF 35-40%, HTN and pAFIB who presented to [facility] for elective AF/AFL  Ablation. On 5/19/23 patient undwernt sucessful RFA for AF/AFL. Patient was monitored overnight. On POD 1 patient remains HD stable with no complaints. Patient remains in SR with no arrythmias noted on tele. Examination of B/L common femoral venous access sites reveal a Clear and dry area with no hematoma or erythema. Patient should continue Eliquis for thromboembolic prophylaxis. Patient is being DC home in stable conditon. Patient is a 62y Male  with PMHx of HFrEF 35-40%, HTN and pAFIB who presented to [facility] for elective AF/AFL  Ablation. On 5/19/23 patient undwernt sucessful RFA for AF/AFL. Patient was monitored overnight. On POD 1 patient remains HD stable with no complaints. Patient remains in SR with no arrythmias noted on tele. Examination of B/L common femoral venous access sites reveal a Clear and dry area with no hematoma or erythema. Patient should continue Eliquis for thromboembolic prophylaxis.  On post procedure day 1, pt found with crackles at left lower lung bases and with O2 sat at 84. He received po Lasix 20 mg x 1, and his home dose Lasix resumed. Pt clinically improved. He noted to run on a hypertensive site, so his Losartan 25 mg po daily was increased to 50 mg po daily. Patient is being DC home in stable condition.

## 2023-05-19 NOTE — PRE-ANESTHESIA EVALUATION ADULT - ANESTHESIA, PREVIOUS REACTION, PROFILE
[id number]- Malcolm sent to Dr Omid Elaine  10/05/22 [address] [place] Days returned call and spoke with Dr Cortney Bernal  10/05/22 10 Karen[address] Cross unknown/not sure

## 2023-05-19 NOTE — PATIENT PROFILE ADULT - FALL HARM RISK - HARM RISK INTERVENTIONS

## 2023-05-19 NOTE — DISCHARGE NOTE PROVIDER - ATTENDING DISCHARGE PHYSICAL EXAMINATION:
Patient seen and examined. Pertinent labs, imaging and telemetry reviewed. I agree with the above:     Patient found to be hypoxic this morning. Desatted with ambulation. Given Lasix 20mg PO and pt urinated. Repeat SpO2 with ambulation >92%.   Patient feeling better but still with right eye discomfort due to likely conjunctivitis.   RRR S1S2 present  CTA B/L post diuretic.   B/L groin sites CDI    Patient is now stable for discharge home with outpatient follow up.   Patient will now take Lasix PO daily.

## 2023-05-20 ENCOUNTER — TRANSCRIPTION ENCOUNTER (OUTPATIENT)
Age: 63
End: 2023-05-20

## 2023-05-20 VITALS — OXYGEN SATURATION: 92 %

## 2023-05-20 LAB
HCT VFR BLD CALC: 52.7 % — HIGH (ref 42–52)
HGB BLD-MCNC: 17.9 G/DL — SIGNIFICANT CHANGE UP (ref 14–18)
MCHC RBC-ENTMCNC: 34 G/DL — SIGNIFICANT CHANGE UP (ref 32–37)
MCHC RBC-ENTMCNC: 34.7 PG — HIGH (ref 27–31)
MCV RBC AUTO: 102.1 FL — HIGH (ref 80–94)
NRBC # BLD: 0 /100 WBCS — SIGNIFICANT CHANGE UP (ref 0–0)
PLATELET # BLD AUTO: 149 K/UL — SIGNIFICANT CHANGE UP (ref 130–400)
PMV BLD: 10.7 FL — HIGH (ref 7.4–10.4)
RBC # BLD: 5.16 M/UL — SIGNIFICANT CHANGE UP (ref 4.7–6.1)
RBC # FLD: 15.5 % — HIGH (ref 11.5–14.5)
WBC # BLD: 13.27 K/UL — HIGH (ref 4.8–10.8)
WBC # FLD AUTO: 13.27 K/UL — HIGH (ref 4.8–10.8)

## 2023-05-20 PROCEDURE — 93010 ELECTROCARDIOGRAM REPORT: CPT

## 2023-05-20 PROCEDURE — 99232 SBSQ HOSP IP/OBS MODERATE 35: CPT

## 2023-05-20 PROCEDURE — 99233 SBSQ HOSP IP/OBS HIGH 50: CPT

## 2023-05-20 PROCEDURE — 99239 HOSP IP/OBS DSCHRG MGMT >30: CPT

## 2023-05-20 PROCEDURE — 71045 X-RAY EXAM CHEST 1 VIEW: CPT | Mod: 26

## 2023-05-20 RX ORDER — LOSARTAN POTASSIUM 100 MG/1
25 TABLET, FILM COATED ORAL ONCE
Refills: 0 | Status: COMPLETED | OUTPATIENT
Start: 2023-05-20 | End: 2023-05-20

## 2023-05-20 RX ORDER — LOSARTAN POTASSIUM 100 MG/1
50 TABLET, FILM COATED ORAL DAILY
Refills: 0 | Status: DISCONTINUED | OUTPATIENT
Start: 2023-05-21 | End: 2023-05-20

## 2023-05-20 RX ORDER — KETOTIFEN FUMARATE 0.34 MG/ML
1 SOLUTION OPHTHALMIC
Refills: 0 | Status: DISCONTINUED | OUTPATIENT
Start: 2023-05-20 | End: 2023-05-20

## 2023-05-20 RX ORDER — LOSARTAN POTASSIUM 100 MG/1
1 TABLET, FILM COATED ORAL
Qty: 30 | Refills: 3
Start: 2023-05-20 | End: 2023-09-16

## 2023-05-20 RX ORDER — ACETAMINOPHEN 500 MG
2 TABLET ORAL
Qty: 0 | Refills: 0 | DISCHARGE
Start: 2023-05-20

## 2023-05-20 RX ORDER — FAMOTIDINE 10 MG/ML
1 INJECTION INTRAVENOUS
Qty: 30 | Refills: 0
Start: 2023-05-20 | End: 2023-06-18

## 2023-05-20 RX ORDER — LOSARTAN POTASSIUM 100 MG/1
1 TABLET, FILM COATED ORAL
Refills: 0 | DISCHARGE

## 2023-05-20 RX ORDER — FUROSEMIDE 40 MG
20 TABLET ORAL ONCE
Refills: 0 | Status: COMPLETED | OUTPATIENT
Start: 2023-05-20 | End: 2023-05-20

## 2023-05-20 RX ORDER — DIPHENHYDRAMINE HCL 50 MG
25 CAPSULE ORAL ONCE
Refills: 0 | Status: COMPLETED | OUTPATIENT
Start: 2023-05-20 | End: 2023-05-20

## 2023-05-20 RX ORDER — KETOTIFEN FUMARATE 0.34 MG/ML
1 SOLUTION OPHTHALMIC
Qty: 1 | Refills: 0
Start: 2023-05-20

## 2023-05-20 RX ADMIN — Medication 600 MILLIGRAM(S): at 17:18

## 2023-05-20 RX ADMIN — FAMOTIDINE 20 MILLIGRAM(S): 10 INJECTION INTRAVENOUS at 11:49

## 2023-05-20 RX ADMIN — LOSARTAN POTASSIUM 25 MILLIGRAM(S): 100 TABLET, FILM COATED ORAL at 05:51

## 2023-05-20 RX ADMIN — KETOTIFEN FUMARATE 1 DROP(S): 0.34 SOLUTION OPHTHALMIC at 17:17

## 2023-05-20 RX ADMIN — APIXABAN 5 MILLIGRAM(S): 2.5 TABLET, FILM COATED ORAL at 17:18

## 2023-05-20 RX ADMIN — Medication 25 MILLIGRAM(S): at 10:35

## 2023-05-20 RX ADMIN — LOSARTAN POTASSIUM 25 MILLIGRAM(S): 100 TABLET, FILM COATED ORAL at 10:02

## 2023-05-20 RX ADMIN — Medication 20 MILLIGRAM(S): at 12:43

## 2023-05-20 RX ADMIN — APIXABAN 5 MILLIGRAM(S): 2.5 TABLET, FILM COATED ORAL at 05:51

## 2023-05-20 RX ADMIN — Medication 100 MILLIGRAM(S): at 10:02

## 2023-05-20 RX ADMIN — Medication 600 MILLIGRAM(S): at 05:51

## 2023-05-20 RX ADMIN — Medication 100 MILLIGRAM(S): at 17:18

## 2023-05-20 RX ADMIN — DAPAGLIFLOZIN 10 MILLIGRAM(S): 10 TABLET, FILM COATED ORAL at 11:50

## 2023-05-20 NOTE — DISCHARGE NOTE NURSING/CASE MANAGEMENT/SOCIAL WORK - NSDCPEWEB_GEN_ALL_CORE
[facility] for Tobacco Control website --- [email or web address] website --- [email or web address]

## 2023-05-20 NOTE — PROGRESS NOTE ADULT - SUBJECTIVE AND OBJECTIVE BOX
Chief complaint: Patient is a 62y old  Male who presents with a chief complaint of     Interval history: Seen and examined at bed side.  Hypoxic while ambulating    Review of systems: A complete 10-point review of systems was obtained and is negative except as stated in the interval history.    Vitals:  T(F): 97.4, Max: 98.4 (05-19 @ 14:19)  HR: 63 (59 - 82)  BP: 161/77 (154/73 - 166/78)  RR: 16 (16 - 20)  SpO2: 84% (84% - 95%)    Ins & outs:     05-19 @ 07:01  -  05-20 @ 07:00  --------------------------------------------------------  IN: 480 mL / OUT: 2000 mL / NET: -1520 mL    05-20 @ 07:01  -  05-20 @ 13:41  --------------------------------------------------------  IN: 275 mL / OUT: 650 mL / NET: -375 mL      Weight trend:  Weight (kg): 110.3 (05-19)    Physical exam:  General: No apparent distress  HEENT: Anicteric sclera. Moist mucous membranes.  Cardiac: Regular rate and rhythm. No murmurs, rubs, or gallops.   Vascular: Symmetric radial pulses. Dorsalis pedis pulses palpable.   Respiratory: Normal effort. Bibasilar crackles. Clear to ascultation.   Abdomen: Soft, nontender. Audible bowel sounds.   Extremities: Warm without edema. No cyanosis or clubbing.   Skin: Warm and dry. No rash.   B/L groin: no hematoma, no swelling, no bleeding  Neurologic: Grossly normal motor function.   Psychiatric: Oriented to person, place, and time.     Data reviewed:  - Telemetry: NSR  - ECG (5/20/23): NSR 63 bpm     - Chest x-ray (5/20/23):         - Labs:                          Medications:  apixaban 5 milliGRAM(s) Oral two times a day  dapagliflozin 10 milliGRAM(s) Oral daily  famotidine    Tablet 20 milliGRAM(s) Oral daily  guaiFENesin  milliGRAM(s) Oral every 12 hours  melatonin 5 milliGRAM(s) Oral at bedtime  metoprolol tartrate 100 milliGRAM(s) Oral two times a day  tamsulosin 0.4 milliGRAM(s) Oral at bedtime    Drips:    PRN:     Allergies    No Known Allergies    Intolerances      Assessment:      62y Male  with PMHx of HFrEF 35-40%, HTN and pAFIB who presented to [facility] for elective AF/AFL  Ablation. On 5/19/23 patient undwernt sucessful RFA for AF/AFL. Patient was monitored overnight. On POD 1 patient remains HD stable with no complaints. Patient remains in SR with no arrythmias noted on tele. Examination of B/L common femoral venous access sites reveal a Clear and dry area with no hematoma or erythema.  s/p Ablation  - restarted on Eliquis 5 mg bid  - hypoxic: given Lasix 20 mg po x 1 (takes Lasix 20 mg po every other day at home), f/u CXR  - Ambulate  - d/c home tonight vs tomorrow    Please contact me with any questions or concerns at [phone].
INTERVAL HPI/OVERNIGHT EVENTS:  Patient sp AF Ablation, POD#1  HD and feeling well    MEDICATIONS  (STANDING):  apixaban 5 milliGRAM(s) Oral two times a day  dapagliflozin 10 milliGRAM(s) Oral daily  diphenhydrAMINE 25 milliGRAM(s) Oral once  famotidine    Tablet 20 milliGRAM(s) Oral daily  guaiFENesin  milliGRAM(s) Oral every 12 hours  losartan 25 milliGRAM(s) Oral once  melatonin 5 milliGRAM(s) Oral at bedtime  metoprolol tartrate 100 milliGRAM(s) Oral two times a day  tamsulosin 0.4 milliGRAM(s) Oral at bedtime    MEDICATIONS  (PRN):  acetaminophen     Tablet .. 650 milliGRAM(s) Oral every 6 hours PRN Mild Pain (1 - 3)  artificial  tears Solution 1 Drop(s) Right EYE daily PRN Dry Eyes      Allergies    No Known Allergies    Intolerances        REVIEW OF SYSTEMS: C/O R eye pain/redness; No CP, palpitations, dizziness or SOB    Vital Signs Last 24 Hrs  T(C): 36.3 (19 May 2023 23:35), Max: 36.9 (19 May 2023 14:19)  T(F): 97.4 (19 May 2023 23:35), Max: 98.4 (19 May 2023 14:19)  HR: 63 (20 May 2023 08:18) (59 - 82)  BP: 161/77 (20 May 2023 08:18) (154/73 - 166/78)  BP(mean): 110 (20 May 2023 08:18) (107 - 117)  RR: 18 (20 May 2023 08:18) (18 - 20)  SpO2: 93% (20 May 2023 08:18) (93% - 95%)    Parameters below as of 19 May 2023 23:35  Patient On (Oxygen Delivery Method): nasal cannula  O2 Flow (L/min): 2      05-19-23 @ 07:01  -  05-20-23 @ 07:00  --------------------------------------------------------  IN: 480 mL / OUT: 2000 mL / NET: -1520 mL        Physical Exam  GENERAL: In no apparent distress, well nourished, and hydrated.  HEAD:  Atraumatic, Normocephalic  EYES: EOMI, PERRLA, R conjunctiva erythematous with clear drainage  NECK: Supple  HEART: Regular rate and rhythm; No murmurs, rubs, or gallops.  PULMONARY: Clear to auscultation and perfusion.  No rales, wheezing, or rhonchi bilaterally.  EXTREMITIES:  2+ Peripheral Pulses, No clubbing, cyanosis, or edema  SKIN: Sutures removed from BL groins, no hematoma  NEUROLOGICAL: Grossly nonfocal    LABS:                RADIOLOGY & ADDITIONAL TESTS:  
Electrophysiology Brief Post-Op Note    I have personally seen and examined the patient.  I agree with the history and physical which I have reviewed and noted any changes below.  05-19-23 @ 08:45    PRE-OP DIAGNOSIS: Paroxysmal AFib, Cardiomyopathy     POST-OP DIAGNOSIS: Paroxysmal AFib, Cardiomyopathy    PROCEDURE:   - GABINO   - Cryo and RF AFib Ablation   - Radiofrequency ablation of CTI     VASCULAR ACCESS (with ultrasound guidance)   - Right femoral vein: 15 Fr   - Left femoral vein: 6 Fr, 8 Fr, 11 Fr   - Right femoral artery: none    Physician: Jasmine Monaco MD  Assistant: None    ANESTHESIA TYPE:  [ X ] General Anesthesia  [  ] Sedation  [ X ] Local/Regional    ESTIMATED BLOOD LOSS:    20  mL    CONDITION  [  ] Critical  [  ] Serious  [  ]Fair  [ X ]Good    SPECIMENS REMOVED (IF APPLICABLE): N/A    IMPLANTS (IF APPLICABLE): N/A    FINDINGS: Paroxysmal AFib, Paroxysmal AT from LSPV and RSPV    COMPLICATIONS: None    PLAN OF CARE  -           Admit to telemetry  -	Start Eliquis 5 mg q12 hrs  -	Start Pepcid x 1 mo  -	Bedrest until AM  -           Follow up with me in the office in 2-3 weeks

## 2023-05-20 NOTE — DISCHARGE NOTE NURSING/CASE MANAGEMENT/SOCIAL WORK - NSDCPEFALRISK_GEN_ALL_CORE
For information on Fall & Injury Prevention, visit: [email or web address] OR  [email or web address] OR  https://www.cdc.gov/steadi/patient.html

## 2023-05-20 NOTE — PROGRESS NOTE ADULT - ASSESSMENT
EP: Dr Monaco    62y Male  with PMHx of HFrEF 35-40%, HTN and pAFIB who presented to [facility] for elective AF/AFL  Ablation. On 5/19/23 patient undwernt sucessful RFA for AF/AFL. C/O R eye redness and pain    Impression:  PAF sp Ablation  R Eye Conjunctivitis  HFrEF 35-40%    Plan:  - Continue Eliquis 5mg Q12h  - Start Pepcid 20mg daily for one month  - Cont all other home medications  - Recommend abx eye drops and ophtho follow up  - No heavy lifting or squatting for one week  - Follow up with Dr Monaco in one month  EP: Dr Monaco    62y Male  with PMHx of HFrEF 35-40%, HTN and pAFIB who presented to [facility] for elective AF/AFL  Ablation. On 5/19/23 patient undwernt sucessful RFA for AF/AFL. C/O R eye redness and pain (reports these symptoms started yesterday prior to coming to the hospital, pt assumed it was allergies but worse today)    Impression:  PAF sp Ablation  R Eye Conjunctivitis  HFrEF 35-40%    Plan:  - Continue Eliquis 5mg Q12h  - Start Pepcid 20mg daily for one month  - Cont all other home medications  - Recommend abx eye drops and ophtho follow up  - No heavy lifting or squatting for one week  - Follow up with Dr Monaco in one month

## 2023-05-20 NOTE — DISCHARGE NOTE NURSING/CASE MANAGEMENT/SOCIAL WORK - PATIENT PORTAL LINK FT
You can access the FollowMyHealth Patient Portal offered by [facility] by registering at the following website: [email or web address]. By joining [facility]’s FollowMyHealth portal, you will also be able to view your health information using other applications (apps) compatible with our system.

## 2023-05-20 NOTE — PROGRESS NOTE ADULT - NS ATTEND AMEND GEN_ALL_CORE FT
Patient seen and examined. Pertinent labs, imaging and telemetry reviewed. I agree with the above:     Patient found to be hypoxic to SpO2 84-88% while lying in bed. Pt denies SOB but has developed cough with some sputum production.   Afebrile. No labs.   -Crackles at LLL on exam.   -SpO2 increased without intervention.   Ambulated on RA with drop on SpO2.   -Lasix 20mg PO now.   -Check labs.  -IF still hypoxic, may need further work up.   -Possible DC today, likely tomorrow based on oxygen.     Discussed with patient and ACP.

## 2023-05-20 NOTE — DISCHARGE NOTE NURSING/CASE MANAGEMENT/SOCIAL WORK - NSDCPEEMAIL_GEN_ALL_CORE
[facility] email [email or web address]

## 2023-05-30 ENCOUNTER — OUTPATIENT (OUTPATIENT)
Dept: OUTPATIENT SERVICES | Facility: HOSPITAL | Age: 63
LOS: 1 days | Discharge: HOME | End: 2023-05-30
Payer: COMMERCIAL

## 2023-05-30 ENCOUNTER — APPOINTMENT (OUTPATIENT)
Dept: SLEEP CENTER | Facility: HOSPITAL | Age: 63
End: 2023-05-30
Payer: COMMERCIAL

## 2023-05-30 DIAGNOSIS — G47.33 OBSTRUCTIVE SLEEP APNEA (ADULT) (PEDIATRIC): ICD-10-CM

## 2023-05-30 DIAGNOSIS — Z98.890 OTHER SPECIFIED POSTPROCEDURAL STATES: Chronic | ICD-10-CM

## 2023-05-30 PROCEDURE — 95811 POLYSOM 6/>YRS CPAP 4/> PARM: CPT | Mod: 26

## 2023-05-30 PROCEDURE — 95811 POLYSOM 6/>YRS CPAP 4/> PARM: CPT

## 2023-06-01 DIAGNOSIS — G47.33 OBSTRUCTIVE SLEEP APNEA (ADULT) (PEDIATRIC): ICD-10-CM

## 2023-06-21 ENCOUNTER — APPOINTMENT (OUTPATIENT)
Dept: ELECTROPHYSIOLOGY | Facility: CLINIC | Age: 63
End: 2023-06-21
Payer: COMMERCIAL

## 2023-06-21 VITALS
DIASTOLIC BLOOD PRESSURE: 82 MMHG | RESPIRATION RATE: 16 BRPM | BODY MASS INDEX: 33.33 KG/M2 | SYSTOLIC BLOOD PRESSURE: 150 MMHG | WEIGHT: 225 LBS | HEART RATE: 66 BPM | HEIGHT: 69 IN | TEMPERATURE: 97.1 F

## 2023-06-21 PROBLEM — R04.0 EPISTAXIS: Chronic | Status: ACTIVE | Noted: 2023-05-12

## 2023-06-21 PROCEDURE — 99214 OFFICE O/P EST MOD 30 MIN: CPT

## 2023-06-21 PROCEDURE — 93000 ELECTROCARDIOGRAM COMPLETE: CPT

## 2023-06-21 RX ORDER — LOSARTAN POTASSIUM AND HYDROCHLOROTHIAZIDE 12.5; 1 MG/1; MG/1
100-12.5 TABLET ORAL
Qty: 30 | Refills: 0 | Status: DISCONTINUED | COMMUNITY
Start: 2022-10-19 | End: 2023-06-21

## 2023-06-21 NOTE — HISTORY OF PRESENT ILLNESS
[FreeTextEntry1] : \par Cardiologist: Dr. Falk\par \par 63 yo M with history of epistaxis, HTN, palpitations and recent diagnosis of paroxysmal AF/AFl with RVR. He received amiodarone drip, s/p GABINO and cardioversion 3/10 and two shocks failed. He was started on Eliquis no s/sx of bleeding reported. Pt was diagnosed with acute HFrEF, cardiomegaly, TTE EF 35-40%. \par \par He underwent GABINO/CV after Amio load on 4/20/23 and successfully converted to NSR. Patient states he feels well since cardioversion. \par \par Underwent Cryo AF and CTI RF ablation 5/19/2023. During ablation, pulmonary vein tachycardia was noted. Patient did well post op and presents for routine follow up. Amio was discontinued in the hospital. He denies chest pain, palpitations, dyspnea and edema. Still smokes. TFTs are abnormal and pt has endocrine appt scheduled.

## 2023-06-21 NOTE — ASSESSMENT
[FreeTextEntry1] : # Paroxysmal AFib/AFlutter\par - Early return to AF after cardioversion. Patient was loaded with Amio and then successfully cardioverted to NSR. Abnormal TFTs and pt developed rash (?drug rash). Amio stopped. Patient is s/p AF/AFlutter ablation 5/19/23 (also had PV tach during procedure). \par - Cont Eliquis 5mg PO BID given recent cardioversion and CHADS VASc of 1 (HTN). \par - Cont Metoprolol. \par - Pt has pulm referral and appt for SANJU eval. \par \par # HTN\par - BP elevated\par - Cont Metoprolol  mg and Losartan/HCTZ 100/12.5 mg\par - 2g Na diet enforced\par - Follow up with cardio \par \par # Tobacco Use\par - Strongly advised pt to stop smoking. Discussed AF risk factors and also d/w pt use of meds or nicotine replacement to help with tobacco cessation.  \par \par I have also advised the patient to go to the nearest emergency room if he experiences any chest pain, dyspnea, syncope, or has any other compelling symptoms.\par \par Follow up in 5 mo

## 2023-06-21 NOTE — CARDIOLOGY SUMMARY
[de-identified] : 6/21/2023: NSR (HR 66 bpm), RBBB, \par 5/10/2023 Sinus luis miguel (HR 49 bpm), RBBB, QTc 490 msec\par 3/29/2023 AFlutter with RVR ( bpm) [de-identified] : GABINO/CV 4/20/2023 Normal global LVSF. Mild LAE. Successful cardioversion to NSR with 200J DCCV\par \par TTE 3/10/2023\par  1. Moderately decreased global left ventricular systolic function with mild concentric LVEF, by visual estimation, is 35 to 40%.\par  2. Endocardial visualization was enhanced with intravenous echo contrast. There is no evidence of LV thrombus.\par  3. Diastolic function could not be assessed in this study due to atrial arrhythmia.\par  4. Normal right ventricular size with moderately reduced systolic function.\par  5. Mildly enlarged left atrium.\par  6. No hemodynamically significant valvular disease.\par  7. Mild pulmonary hypertension (PASP = 42mmHg).\par  8. No pericardial effusion.\par  9. NB: patient was in rapid atrial fibrillation during this exam.\par \par GABINO  3/10/2023 \par 1. Limited exam due to tenuous respiratory status and desaturation. Exam terminated early due to apnea.\par  2. No left atrial appendage thrombus and normal left atrial appendage velocities.\par  3. Moderately decreased global left ventricular systolic function.\par  4. Mildly enlarged left atrium.\par  5. Unsuccessful conversion after two attempts at 200J of direct current synchronized shock.

## 2023-06-21 NOTE — PHYSICAL EXAM
[Well Developed] : well developed [Well Nourished] : well nourished [No Acute Distress] : no acute distress [Obese] : obese [Normal Conjunctiva] : normal conjunctiva [Normal Venous Pressure] : normal venous pressure [Normal S1, S2] : normal S1, S2 [No Murmur] : no murmur [Clear Lung Fields] : clear lung fields [Good Air Entry] : good air entry [No Respiratory Distress] : no respiratory distress  [Soft] : abdomen soft [Normal Gait] : normal gait [No Edema] : no edema [No Rash] : no rash [Moves all extremities] : moves all extremities [Normal Speech] : normal speech [Alert and Oriented] : alert and oriented [de-identified] : bilateral groins well healed. No bleeding, hematoma, or ecchymosis noted.

## 2023-06-22 ENCOUNTER — APPOINTMENT (OUTPATIENT)
Dept: ENDOCRINOLOGY | Facility: CLINIC | Age: 63
End: 2023-06-22

## 2023-06-22 ENCOUNTER — OUTPATIENT (OUTPATIENT)
Dept: OUTPATIENT SERVICES | Facility: HOSPITAL | Age: 63
LOS: 1 days | End: 2023-06-22
Payer: COMMERCIAL

## 2023-06-22 VITALS
SYSTOLIC BLOOD PRESSURE: 155 MMHG | BODY MASS INDEX: 33.77 KG/M2 | DIASTOLIC BLOOD PRESSURE: 83 MMHG | WEIGHT: 228 LBS | HEART RATE: 86 BPM | HEIGHT: 69 IN

## 2023-06-22 DIAGNOSIS — Z00.00 ENCOUNTER FOR GENERAL ADULT MEDICAL EXAMINATION WITHOUT ABNORMAL FINDINGS: ICD-10-CM

## 2023-06-22 DIAGNOSIS — Z98.890 OTHER SPECIFIED POSTPROCEDURAL STATES: Chronic | ICD-10-CM

## 2023-06-22 PROCEDURE — 99204 OFFICE O/P NEW MOD 45 MIN: CPT

## 2023-06-22 NOTE — ASSESSMENT
[FreeTextEntry1] : 62 M with PMH of HTN, SANJU, HfrEF, Paroxysmal A-fib on Eliquis, Epistaxis, and BPH presents today to the clinic as a referral for abnormal TFTs. \par \par #Abnormal TFTs\par - TSH- 5.6, Free T4 0.7\par - no medical intervention necessary at this time\par - return back to primary care clinic. should check lipid profile and TFTs\par \par may have alcoholic cardiomyopathy, strongy advised to stop alcohol.

## 2023-06-22 NOTE — PHYSICAL EXAM
[Alert] : alert [Well Nourished] : well nourished [Normal Sclera/Conjunctiva] : normal sclera/conjunctiva [EOMI] : extra ocular movement intact [PERRL] : pupils equal, round and reactive to light [Normal Outer Ear/Nose] : the ears and nose were normal in appearance [Normal TMs] : both tympanic membranes were normal [No Neck Mass] : no neck mass was observed [Supple] : the neck was supple [Thyroid Not Enlarged] : the thyroid was not enlarged [No Thyroid Nodules] : no palpable thyroid nodules [No Respiratory Distress] : no respiratory distress [Normal PMI] : the apical impulse was normal [Normal S1, S2] : normal S1 and S2 [Normal Rate] : heart rate was normal [Regular Rhythm] : with a regular rhythm [Normal Supraclavicular Nodes] : no supraclavicular lymphadenopathy [Normal Inguinal Nodes] : no inguinal lymphadenopathy [No CVA Tenderness] : no ~M costovertebral angle tenderness [Normal Gait] : normal gait [No Rash] : no rash [No Tremors] : no tremors [Oriented x3] : oriented to person, place, and time [Normal Affect] : the affect was normal [Normal Mood] : the mood was normal

## 2023-06-22 NOTE — HISTORY OF PRESENT ILLNESS
[FreeTextEntry1] : 62 M with PMH of HTN, SANJU, HfrEF, Paroxysmal A-fib on Eliquis, Epistaxis, and BPH presents today to the clinic as a referral for abnormal TFTs. \par \par Early return to AF after cardioversion. Patient was loaded with Amio. Developed abnormal TFTs. Decided to not continue amiodarone anymore after discussion with cardio-> S/P ablation\par \par Endo referral was regarding abnormal TFTs. Currently asymptomatic. Denies palpitations, heat/cold intolerance, swelling of extremities, dyphagia, fevers, chest pain, shortness of breath, abdominal pain, nausea/vomiting\par \par

## 2023-06-22 NOTE — REVIEW OF SYSTEMS
[Fatigue] : no fatigue [Decreased Appetite] : appetite not decreased [Recent Weight Gain (___ Lbs)] : no recent weight gain [Recent Weight Loss (___ Lbs)] : no recent weight loss [Dry Eyes] : no dryness [Blurred Vision] : no blurred vision [Dysphagia] : no dysphagia [Chest Pain] : no chest pain [Palpitations] : no palpitations [Lower Ext Edema] : no lower extremity edema [Shortness Of Breath] : no shortness of breath [Cough] : no cough [Nausea] : no nausea [Vomiting] : no vomiting [Polyuria] : no polyuria [Joint Pain] : no joint pain [Muscle Weakness] : no muscle weakness [Dizziness] : no dizziness [Tremors] : no tremors [Depression] : no depression [Suicidal Ideation] : no suicidal ideation [Polydipsia] : no polydipsia [Cold Intolerance] : no cold intolerance [Heat Intolerance] : no heat intolerance [Easy Bleeding] : no ~M tendency for easy bleeding [Easy Bruising] : no tendency for easy bruising [Swelling] : no swelling [Lymphadenopathy] : no lymphadenopathy

## 2023-06-26 ENCOUNTER — RESULT CHARGE (OUTPATIENT)
Age: 63
End: 2023-06-26

## 2023-06-26 ENCOUNTER — APPOINTMENT (OUTPATIENT)
Dept: PULMONOLOGY | Facility: CLINIC | Age: 63
End: 2023-06-26
Payer: COMMERCIAL

## 2023-06-26 ENCOUNTER — APPOINTMENT (OUTPATIENT)
Dept: CARDIOLOGY | Facility: CLINIC | Age: 63
End: 2023-06-26
Payer: COMMERCIAL

## 2023-06-26 VITALS
HEART RATE: 65 BPM | WEIGHT: 226 LBS | DIASTOLIC BLOOD PRESSURE: 80 MMHG | BODY MASS INDEX: 33.37 KG/M2 | SYSTOLIC BLOOD PRESSURE: 130 MMHG

## 2023-06-26 VITALS
DIASTOLIC BLOOD PRESSURE: 80 MMHG | HEIGHT: 69 IN | HEART RATE: 67 BPM | OXYGEN SATURATION: 95 % | WEIGHT: 225 LBS | BODY MASS INDEX: 33.33 KG/M2 | SYSTOLIC BLOOD PRESSURE: 124 MMHG

## 2023-06-26 DIAGNOSIS — E66.9 OBESITY, UNSPECIFIED: ICD-10-CM

## 2023-06-26 DIAGNOSIS — E03.9 HYPOTHYROIDISM, UNSPECIFIED: ICD-10-CM

## 2023-06-26 PROCEDURE — 99213 OFFICE O/P EST LOW 20 MIN: CPT

## 2023-06-26 PROCEDURE — 93000 ELECTROCARDIOGRAM COMPLETE: CPT

## 2023-06-26 PROCEDURE — 99214 OFFICE O/P EST MOD 30 MIN: CPT

## 2023-06-26 NOTE — HISTORY OF PRESENT ILLNESS
[TextBox_4] : 62-year-old man with history of hypertension, recent diagnosis with paroxysmal A-fib status post GABINO guided cardioversion, on anticoagulation with Eliquis, history of epistaxis, is known to snore, has witnessed apneas, tiredness and fatigue in the daytime, and a high likelihood of SANJU presenting for evaluation.  He is also an active smoker of more than 20 pack years.  He is not interested in smoking cessation on fluoroscopy or low-dose CT for lung cancer screening.  He has some dyspnea occasionally on exertion but is not currently i interested in getting PFTs.\par \par 6/26/23: Sleep study reviewed; Severe SANJU noted. Auto PAP 6-18 ordered. He just quit smoking; still not interested in PFTs or LDCT at the moment. He said he will think about it for next visit. Will f/u in 2-3 months after starting CPAP to go over compliance.

## 2023-06-26 NOTE — PHYSICAL EXAM
[Well Developed] : well developed [Well Nourished] : well nourished [No Acute Distress] : no acute distress [Obese] : obese [Normal Conjunctiva] : normal conjunctiva [Normal Venous Pressure] : normal venous pressure [No Carotid Bruit] : no carotid bruit [Normal S1, S2] : normal S1, S2 [No Murmur] : no murmur [No Rub] : no rub [No Gallop] : no gallop [Clear Lung Fields] : clear lung fields [Good Air Entry] : good air entry [No Respiratory Distress] : no respiratory distress  [Soft] : abdomen soft [Non Tender] : non-tender [No Masses/organomegaly] : no masses/organomegaly [Normal Bowel Sounds] : normal bowel sounds [Normal Gait] : normal gait [No Edema] : no edema [No Cyanosis] : no cyanosis [No Clubbing] : no clubbing [No Varicosities] : no varicosities [No Rash] : no rash [No Skin Lesions] : no skin lesions [Moves all extremities] : moves all extremities [No Focal Deficits] : no focal deficits [Normal Speech] : normal speech [Alert and Oriented] : alert and oriented [Normal memory] : normal memory [de-identified] : tachycardic

## 2023-06-26 NOTE — ASSESSMENT
[FreeTextEntry1] : 61 y/o male with recent hospital admission\par PAF/flutter\par Back in sinus after ablation, off Amiodarone 200 mg q12 (had abnormal TFT)\par Anticoagulated with Eliquis\par Systolic dysfunction, EF 35-40% by echo - likely tachy induced.\par \par \par \par Plan:\par \par C/w metoprolol, ARB, diuretic.\par Off Amiodarone\par F/u with EP post ablation.\par C/w anticoagulation\par \par C/w ARB, diuretic\par BP control\par \par Pulmonary f/u -  has SANJU, c/w CPAP\par Smoking cessation discussed.\par Next visit repeat echo to assess LV function, if still depressed, will need ischemic w/u.\par \par Discussed in detail.\par \par \par

## 2023-06-26 NOTE — HISTORY OF PRESENT ILLNESS
[FreeTextEntry1] : Pt is 62 year old male with PMH of epistaxis, insomnia,  palpitations, HTN.  Pt is seen s/p hospitalization, was dx with new onset  Paroxysmal Atrial flutter with Rapid Ventricular Response. Recieved amiodarone drip, s/p GABINO and cardioversion 3/10 two shock failed.  Pt was started on Eliquis no s.s of bleeding reported.  Pt was dx with  Acute HFrEF, cardiomegaly, TTE EF 35-40%.  Pt denies chest pain, no palpitations, SOB with exertion, no edema.\par Currently smoking 5 cigarettes per day.  Hx of smoking 1/2 pack x 30 years. \par S/p new Dx of SANJU CPAP machine was ordered.  S/p ablation of flutter and Cryo ablation of AF.  Off Amiodarone. Reports no palpitations \par   \par  \par  \par

## 2023-06-26 NOTE — PHYSICAL EXAM
[No Acute Distress] : no acute distress [Normal Oropharynx] : normal oropharynx [III] : Mallampati Class: III [Normal Appearance] : normal appearance [Normal Rate/Rhythm] : normal rate/rhythm [No Neck Mass] : no neck mass [Normal S1, S2] : normal s1, s2 [No Murmurs] : no murmurs [No Resp Distress] : no resp distress [Clear to Auscultation Bilaterally] : clear to auscultation bilaterally [No Abnormalities] : no abnormalities [Benign] : benign [Normal Gait] : normal gait [No Clubbing] : no clubbing [No Cyanosis] : no cyanosis [No Edema] : no edema [FROM] : FROM [Normal Color/ Pigmentation] : normal color/ pigmentation [No Focal Deficits] : no focal deficits [Oriented x3] : oriented x3 [Normal Affect] : normal affect

## 2023-06-27 ENCOUNTER — RX RENEWAL (OUTPATIENT)
Age: 63
End: 2023-06-27

## 2023-07-18 ENCOUNTER — RX RENEWAL (OUTPATIENT)
Age: 63
End: 2023-07-18

## 2023-07-24 NOTE — PATIENT PROFILE ADULT - MONEY FOR FOOD
OT evaluation completed. OT POC and goals established.     Problem: Occupational Therapy  Goal: Occupational Therapy Goal  Description: Goals to be met by: 8/7/2023     Patient will increase functional independence with ADLs by performing:    UE Dressing with Minimal Assistance.  LE Dressing with Moderate Assistance.  Grooming while EOB with Moderate Assistance.  Supine to sit with Minimal Assistance.  Stand pivot transfers with Minimal Assistance.    Outcome: Ongoing, Progressing      no

## 2023-07-26 ENCOUNTER — APPOINTMENT (OUTPATIENT)
Dept: INTERNAL MEDICINE | Facility: CLINIC | Age: 63
End: 2023-07-26
Payer: COMMERCIAL

## 2023-07-26 ENCOUNTER — OUTPATIENT (OUTPATIENT)
Dept: OUTPATIENT SERVICES | Facility: HOSPITAL | Age: 63
LOS: 1 days | End: 2023-07-26
Payer: COMMERCIAL

## 2023-07-26 ENCOUNTER — NON-APPOINTMENT (OUTPATIENT)
Age: 63
End: 2023-07-26

## 2023-07-26 VITALS — HEIGHT: 69 IN | WEIGHT: 220 LBS | HEART RATE: 71 BPM | OXYGEN SATURATION: 98 % | BODY MASS INDEX: 32.58 KG/M2

## 2023-07-26 VITALS — DIASTOLIC BLOOD PRESSURE: 82 MMHG | SYSTOLIC BLOOD PRESSURE: 138 MMHG

## 2023-07-26 DIAGNOSIS — Z87.438 PERSONAL HISTORY OF OTHER DISEASES OF MALE GENITAL ORGANS: ICD-10-CM

## 2023-07-26 DIAGNOSIS — L57.0 ACTINIC KERATOSIS: ICD-10-CM

## 2023-07-26 DIAGNOSIS — Z12.11 ENCOUNTER FOR SCREENING FOR MALIGNANT NEOPLASM OF COLON: ICD-10-CM

## 2023-07-26 DIAGNOSIS — H10.9 UNSPECIFIED CONJUNCTIVITIS: ICD-10-CM

## 2023-07-26 DIAGNOSIS — Z98.890 OTHER SPECIFIED POSTPROCEDURAL STATES: Chronic | ICD-10-CM

## 2023-07-26 DIAGNOSIS — Z78.9 OTHER SPECIFIED HEALTH STATUS: ICD-10-CM

## 2023-07-26 DIAGNOSIS — Z00.00 ENCOUNTER FOR GENERAL ADULT MEDICAL EXAMINATION WITHOUT ABNORMAL FINDINGS: ICD-10-CM

## 2023-07-26 DIAGNOSIS — Z23 ENCOUNTER FOR IMMUNIZATION: ICD-10-CM

## 2023-07-26 PROCEDURE — 99214 OFFICE O/P EST MOD 30 MIN: CPT | Mod: GC

## 2023-07-26 PROCEDURE — 99214 OFFICE O/P EST MOD 30 MIN: CPT

## 2023-07-26 RX ORDER — TAMSULOSIN HYDROCHLORIDE 0.4 MG/1
0.4 CAPSULE ORAL
Qty: 180 | Refills: 0 | Status: COMPLETED | COMMUNITY
Start: 2023-03-20 | End: 2023-07-26

## 2023-07-26 RX ORDER — SILDENAFIL 50 MG/1
50 TABLET ORAL
Qty: 42 | Refills: 0 | Status: COMPLETED | COMMUNITY
Start: 2022-01-30 | End: 2023-07-26

## 2023-07-26 NOTE — ASSESSMENT
[FreeTextEntry1] : 62 yo M with PMH of HTN, SANJU, HfimpEF, Paroxysmal A-fib on Eliquis, Epistaxis, and BPH presents today to the clinic to establish care.\par \par # Epistaxis, resolved\par - recently admitted to the hospital for Epistaxis \par - Follows with Dr. Collins \par - No episodes of epistaxis since discharge from hospital on march 16 2023 \par \par # Polycythemia\par Hb 18 on 2 separate occasions\par likely due to smoking or SANJU\par - check EPO levels\par \par # Paroxysmal  A-fib with RVR\par CHADSVASc 1\par - s/p GABINO dccv 5/19/23 & ablation (PV tachy during procedure)\par - Newly diagnosed during hospitalization \par - On Eliquis 5mg BID\par - On Metoprolol 100 mg OD vs bid (will get med next visit)\par - Amiodarone 200 mg BID, until 3/28 than OD starting 3/29\par - f/u with Dr. Monaco EP\par \par # HFimpEF  \par - Echo showed EF 35 - 40 % -> normal LV global systolic function on GABINO\par - Repeat TTE. If depressed EF, consider ischemic w/u\par - on Metoprolol 100 mg OD vs bid (will get med next visit)\par - Losartan 50 mg OD \par - Dapagliflozin 10 mg OD \par - Lasix 20 mg as needed\par \par # abnormal TSH/Hypothyroidism\par hx amiodarone load\par - repeat TFTs\par \par # Hx BPH\par - stopped Tamsulosin 2 weeks ago, no symptoms\par - Hydronephrosis on imaging, no evidence of kidney stones  \par - Urology follow up, needs appointment \par \par # UPJ obstruction\par - RBUS , NM renal scan + PSA. F/u Uro\par \par # CKD2\par - avoid nephrotoxic drugs\par - monitor Cr\par \par # SANJU \par - STOP BANG score 8, very high risk \par - Referral for sleep study -> Severe SANJU\par - c/w AutoPap 6-18 (pending device delivery)\par \par # Transaminitis\par ALT:AST < 1\par avoid alcohol\par - weight loss advised\par - LFTs\par - CLD w/u in future if weight loss doesn't help (deferred by patient for now)\par - Hepatology referral in future if weight loss doesn't help (deferred by patient for now)\par \par # Obesity\par - Diet and exercise advised\par - refused dietician referral\par \par # Telangiectasia\par # Actinic keratosis\par hx extensive sun exposure\par - Derm Referral\par \par # Conjunctivitis\par - Artificial tears\par \par #HCM \par - Never had a colonoscopy, referred to GI, patient was agreeable. Patient currently hesitant. Will do FIT test annual\par - Refused Flu vaccine at this time \par - Hx of smoking, advised on low-dose CT chest, patient not agreeable at this time. He would like to deal with his active disease at this moment. Recently quit smoking\par - TdaP in 2030\par - Covid vaccine x 2\par - HCV screening\par - lipid profile\par - Alcohol avoidance\par - Ophthalmo referral\par - RTC in 3 months

## 2023-07-26 NOTE — END OF VISIT
[] : Resident [FreeTextEntry3] : Pt. did not complete blood work ordered 3/2023, and did not schedule GI appointment for colorectal cancer screening since he changed his mind, and declined colonoscopy.  Will order FOBT test.

## 2023-07-26 NOTE — REVIEW OF SYSTEMS
[Joint Pain] : joint pain [Headache] : headache [Insomnia] : insomnia [Redness] : redness [Itching] : itching [Fever] : no fever [Chills] : no chills [Discharge] : no discharge [Pain] : no pain [Earache] : no earache [Hearing Loss] : no hearing loss [Nosebleeds] : no nosebleeds [Sore Throat] : no sore throat [Chest Pain] : no chest pain [Palpitations] : no palpitations [Shortness Of Breath] : no shortness of breath [Wheezing] : no wheezing [Cough] : no cough [Dyspnea on Exertion] : not dyspnea on exertion [Abdominal Pain] : no abdominal pain [Constipation] : no constipation [Diarrhea] : no diarrhea [Vomiting] : no vomiting [Heartburn] : no heartburn [Dysuria] : no dysuria [Hesitancy] : no hesitancy [Nocturia] : no nocturia [Hematuria] : no hematuria [Frequency] : no frequency [Itching] : no itching [Skin Rash] : no skin rash [Dizziness] : no dizziness [Suicidal] : not suicidal [FreeTextEntry3] : right eye [FreeTextEntry4] : Since discharge from hospital 3/16/2023 no episodes of epistaxis [FreeTextEntry9] : Bilateral knee pain, hx of acl repair of the left knee, Lumbar pain upon standing too long  [de-identified] : occasional occipital headache/ neck stiffness

## 2023-07-26 NOTE — PHYSICAL EXAM
[No Acute Distress] : no acute distress [Well Nourished] : well nourished [Well Developed] : well developed [Well-Appearing] : well-appearing [PERRL] : pupils equal round and reactive to light [Normal Sclera/Conjunctiva] : normal sclera/conjunctiva [Normal Outer Ear/Nose] : the outer ears and nose were normal in appearance [Normal Oropharynx] : the oropharynx was normal [No JVD] : no jugular venous distention [No Lymphadenopathy] : no lymphadenopathy [No Respiratory Distress] : no respiratory distress  [No Accessory Muscle Use] : no accessory muscle use [Clear to Auscultation] : lungs were clear to auscultation bilaterally [Normal Rate] : normal rate  [Normal S1, S2] : normal S1 and S2 [No Murmur] : no murmur heard [No Carotid Bruits] : no carotid bruits [Soft] : abdomen soft [No Edema] : there was no peripheral edema [Non Tender] : non-tender [Normal Posterior Cervical Nodes] : no posterior cervical lymphadenopathy [Normal Anterior Cervical Nodes] : no anterior cervical lymphadenopathy [No CVA Tenderness] : no CVA  tenderness [No Spinal Tenderness] : no spinal tenderness [No Joint Swelling] : no joint swelling [No Focal Deficits] : no focal deficits [Normal Affect] : the affect was normal [Normal Insight/Judgement] : insight and judgment were intact [Regular Rhythm] : with a regular rhythm [Non-distended] : non-distended [No Masses] : no abdominal mass palpated [Normal Bowel Sounds] : normal bowel sounds [de-identified] : no erythema of eyes, no preauricular LNs [de-identified] : Multiple actinic keratosis lesions in upper and lower extremities; telangectasia of face

## 2023-08-02 DIAGNOSIS — E66.9 OBESITY, UNSPECIFIED: ICD-10-CM

## 2023-08-02 DIAGNOSIS — N18.2 CHRONIC KIDNEY DISEASE, STAGE 2 (MILD): ICD-10-CM

## 2023-08-02 DIAGNOSIS — D75.1 SECONDARY POLYCYTHEMIA: ICD-10-CM

## 2023-08-02 DIAGNOSIS — L57.0 ACTINIC KERATOSIS: ICD-10-CM

## 2023-08-02 DIAGNOSIS — G47.33 OBSTRUCTIVE SLEEP APNEA (ADULT) (PEDIATRIC): ICD-10-CM

## 2023-08-02 DIAGNOSIS — R74.01 ELEVATION OF LEVELS OF LIVER TRANSAMINASE LEVELS: ICD-10-CM

## 2023-08-02 DIAGNOSIS — R79.89 OTHER SPECIFIED ABNORMAL FINDINGS OF BLOOD CHEMISTRY: ICD-10-CM

## 2023-08-02 DIAGNOSIS — I48.0 PAROXYSMAL ATRIAL FIBRILLATION: ICD-10-CM

## 2023-08-02 DIAGNOSIS — F17.200 NICOTINE DEPENDENCE, UNSPECIFIED, UNCOMPLICATED: ICD-10-CM

## 2023-08-02 DIAGNOSIS — I10 ESSENTIAL (PRIMARY) HYPERTENSION: ICD-10-CM

## 2023-08-02 DIAGNOSIS — N40.0 BENIGN PROSTATIC HYPERPLASIA WITHOUT LOWER URINARY TRACT SYMPTOMS: ICD-10-CM

## 2023-08-02 DIAGNOSIS — H10.9 UNSPECIFIED CONJUNCTIVITIS: ICD-10-CM

## 2023-09-11 ENCOUNTER — APPOINTMENT (OUTPATIENT)
Dept: PULMONOLOGY | Facility: CLINIC | Age: 63
End: 2023-09-11
Payer: COMMERCIAL

## 2023-09-11 VITALS
SYSTOLIC BLOOD PRESSURE: 110 MMHG | HEART RATE: 67 BPM | DIASTOLIC BLOOD PRESSURE: 70 MMHG | WEIGHT: 225 LBS | OXYGEN SATURATION: 97 % | BODY MASS INDEX: 34.1 KG/M2 | HEIGHT: 68 IN

## 2023-09-11 DIAGNOSIS — F17.200 NICOTINE DEPENDENCE, UNSPECIFIED, UNCOMPLICATED: ICD-10-CM

## 2023-09-11 PROCEDURE — 99213 OFFICE O/P EST LOW 20 MIN: CPT

## 2023-10-02 ENCOUNTER — RX RENEWAL (OUTPATIENT)
Age: 63
End: 2023-10-02

## 2023-10-23 ENCOUNTER — APPOINTMENT (OUTPATIENT)
Dept: UROLOGY | Facility: CLINIC | Age: 63
End: 2023-10-23

## 2023-10-24 ENCOUNTER — OUTPATIENT (OUTPATIENT)
Dept: OUTPATIENT SERVICES | Facility: HOSPITAL | Age: 63
LOS: 1 days | End: 2023-10-24
Payer: COMMERCIAL

## 2023-10-24 DIAGNOSIS — Z98.890 OTHER SPECIFIED POSTPROCEDURAL STATES: Chronic | ICD-10-CM

## 2023-10-24 DIAGNOSIS — Z00.00 ENCOUNTER FOR GENERAL ADULT MEDICAL EXAMINATION WITHOUT ABNORMAL FINDINGS: ICD-10-CM

## 2023-10-24 PROCEDURE — 80061 LIPID PANEL: CPT

## 2023-10-24 PROCEDURE — 80053 COMPREHEN METABOLIC PANEL: CPT

## 2023-10-24 PROCEDURE — 85027 COMPLETE CBC AUTOMATED: CPT

## 2023-10-24 PROCEDURE — 82977 ASSAY OF GGT: CPT

## 2023-10-24 PROCEDURE — 82668 ASSAY OF ERYTHROPOIETIN: CPT

## 2023-10-24 PROCEDURE — 84443 ASSAY THYROID STIM HORMONE: CPT

## 2023-10-24 PROCEDURE — 86803 HEPATITIS C AB TEST: CPT

## 2023-10-24 PROCEDURE — 83036 HEMOGLOBIN GLYCOSYLATED A1C: CPT

## 2023-10-24 PROCEDURE — 36415 COLL VENOUS BLD VENIPUNCTURE: CPT

## 2023-10-25 ENCOUNTER — APPOINTMENT (OUTPATIENT)
Dept: INTERNAL MEDICINE | Facility: CLINIC | Age: 63
End: 2023-10-25
Payer: COMMERCIAL

## 2023-10-25 ENCOUNTER — OUTPATIENT (OUTPATIENT)
Dept: OUTPATIENT SERVICES | Facility: HOSPITAL | Age: 63
LOS: 1 days | End: 2023-10-25
Payer: COMMERCIAL

## 2023-10-25 VITALS
WEIGHT: 232 LBS | HEART RATE: 80 BPM | TEMPERATURE: 97 F | DIASTOLIC BLOOD PRESSURE: 91 MMHG | SYSTOLIC BLOOD PRESSURE: 156 MMHG | BODY MASS INDEX: 26.84 KG/M2 | HEIGHT: 78 IN | OXYGEN SATURATION: 97 %

## 2023-10-25 DIAGNOSIS — R74.01 ELEVATION OF LEVELS OF LIVER TRANSAMINASE LEVELS: ICD-10-CM

## 2023-10-25 DIAGNOSIS — Z00.00 ENCOUNTER FOR GENERAL ADULT MEDICAL EXAMINATION WITHOUT ABNORMAL FINDINGS: ICD-10-CM

## 2023-10-25 DIAGNOSIS — G47.33 OBSTRUCTIVE SLEEP APNEA (ADULT) (PEDIATRIC): ICD-10-CM

## 2023-10-25 DIAGNOSIS — N18.2 CHRONIC KIDNEY DISEASE, STAGE 2 (MILD): ICD-10-CM

## 2023-10-25 DIAGNOSIS — Z87.898 PERSONAL HISTORY OF OTHER SPECIFIED CONDITIONS: ICD-10-CM

## 2023-10-25 DIAGNOSIS — E03.9 HYPOTHYROIDISM, UNSPECIFIED: ICD-10-CM

## 2023-10-25 DIAGNOSIS — I78.1 NEVUS, NON-NEOPLASTIC: ICD-10-CM

## 2023-10-25 DIAGNOSIS — Z98.890 OTHER SPECIFIED POSTPROCEDURAL STATES: Chronic | ICD-10-CM

## 2023-10-25 DIAGNOSIS — R79.89 OTHER SPECIFIED ABNORMAL FINDINGS OF BLOOD CHEMISTRY: ICD-10-CM

## 2023-10-25 LAB
ALBUMIN SERPL ELPH-MCNC: 4.6 G/DL
ALP BLD-CCNC: 68 U/L
ALT SERPL-CCNC: 41 U/L
ANION GAP SERPL CALC-SCNC: 21 MMOL/L
AST SERPL-CCNC: 28 U/L
BILIRUB SERPL-MCNC: 1 MG/DL
BUN SERPL-MCNC: 11 MG/DL
CALCIUM SERPL-MCNC: 9.4 MG/DL
CHLORIDE SERPL-SCNC: 97 MMOL/L
CHOLEST SERPL-MCNC: 176 MG/DL
CO2 SERPL-SCNC: 19 MMOL/L
CREAT SERPL-MCNC: 1 MG/DL
EGFR: 85 ML/MIN/1.73M2
ESTIMATED AVERAGE GLUCOSE: 117 MG/DL
GGT SERPL-CCNC: 86 U/L
GLUCOSE SERPL-MCNC: 102 MG/DL
HBA1C MFR BLD HPLC: 5.7 %
HDLC SERPL-MCNC: 51 MG/DL
LDLC SERPL CALC-MCNC: 105 MG/DL
NONHDLC SERPL-MCNC: 125 MG/DL
POTASSIUM SERPL-SCNC: 4.8 MMOL/L
PROT SERPL-MCNC: 7.6 G/DL
SODIUM SERPL-SCNC: 137 MMOL/L
TRIGL SERPL-MCNC: 101 MG/DL
TSH SERPL-ACNC: 3.07 UIU/ML

## 2023-10-25 PROCEDURE — 99214 OFFICE O/P EST MOD 30 MIN: CPT | Mod: GC

## 2023-10-25 PROCEDURE — 99214 OFFICE O/P EST MOD 30 MIN: CPT

## 2023-10-26 LAB
EPO SERPL-MCNC: 20.3 MIU/ML
HCV AB SER QL: NONREACTIVE
HCV S/CO RATIO: 0.13 S/CO

## 2023-10-27 DIAGNOSIS — D75.1 SECONDARY POLYCYTHEMIA: ICD-10-CM

## 2023-10-27 DIAGNOSIS — I50.9 HEART FAILURE, UNSPECIFIED: ICD-10-CM

## 2023-10-27 DIAGNOSIS — N18.2 CHRONIC KIDNEY DISEASE, STAGE 2 (MILD): ICD-10-CM

## 2023-10-27 DIAGNOSIS — E03.9 HYPOTHYROIDISM, UNSPECIFIED: ICD-10-CM

## 2023-10-27 DIAGNOSIS — I10 ESSENTIAL (PRIMARY) HYPERTENSION: ICD-10-CM

## 2023-10-27 DIAGNOSIS — E66.9 OBESITY, UNSPECIFIED: ICD-10-CM

## 2023-10-27 DIAGNOSIS — G47.33 OBSTRUCTIVE SLEEP APNEA (ADULT) (PEDIATRIC): ICD-10-CM

## 2023-10-27 DIAGNOSIS — I48.0 PAROXYSMAL ATRIAL FIBRILLATION: ICD-10-CM

## 2023-10-27 DIAGNOSIS — R74.01 ELEVATION OF LEVELS OF LIVER TRANSAMINASE LEVELS: ICD-10-CM

## 2023-11-08 NOTE — HISTORY OF PRESENT ILLNESS
Spoke with Olman in the lab regarding adding on an everolimus level to today's labs.   [de-identified] : 62 yo M with PMH of HTN, SANJU, HFimpEF, Paroxysmal A-fib on Eliquis, Epistaxis, and BPH presents today to the clinic for f/u. Last visit, patient had visited the ED in March for epistaxis and palpitations x 1 day. In ED, patient also endorsed having orthopnea, exertional dyspnea, and occasional LE swelling. He was hospitalized and was found to have new onset CHF ( Echo showed EF 35 to 45%) and A-fib RVR. GABINO cardioversion was done in May 2023, EF WNL.  Also during hospitalization  imaging showed Right hydronephrosis, no stones on CT scan. Renal scan was done and showed asymmetric findings which need to be followed as outpatient as per Urology. Patient had not seen his PCP for 4 years. Was only taking during this period amlodipine and losartan for HTN.\par \par Complains of right eye itching, redness, tearing. No sick contacts [FreeTextEntry1] : follow up

## 2023-11-22 ENCOUNTER — APPOINTMENT (OUTPATIENT)
Dept: ELECTROPHYSIOLOGY | Facility: CLINIC | Age: 63
End: 2023-11-22
Payer: COMMERCIAL

## 2023-11-22 VITALS
HEIGHT: 68 IN | DIASTOLIC BLOOD PRESSURE: 70 MMHG | SYSTOLIC BLOOD PRESSURE: 110 MMHG | TEMPERATURE: 98 F | WEIGHT: 225 LBS | BODY MASS INDEX: 34.1 KG/M2 | HEART RATE: 150 BPM

## 2023-11-22 PROCEDURE — 93000 ELECTROCARDIOGRAM COMPLETE: CPT

## 2023-11-22 PROCEDURE — 99215 OFFICE O/P EST HI 40 MIN: CPT

## 2023-12-04 ENCOUNTER — APPOINTMENT (OUTPATIENT)
Dept: INTERNAL MEDICINE | Facility: CLINIC | Age: 63
End: 2023-12-04

## 2023-12-07 ENCOUNTER — OUTPATIENT (OUTPATIENT)
Dept: OUTPATIENT SERVICES | Facility: HOSPITAL | Age: 63
LOS: 1 days | End: 2023-12-07
Payer: COMMERCIAL

## 2023-12-07 DIAGNOSIS — Z98.890 OTHER SPECIFIED POSTPROCEDURAL STATES: Chronic | ICD-10-CM

## 2023-12-07 PROCEDURE — 93005 ELECTROCARDIOGRAM TRACING: CPT

## 2023-12-08 DIAGNOSIS — I48.19 OTHER PERSISTENT ATRIAL FIBRILLATION: ICD-10-CM

## 2023-12-09 DIAGNOSIS — I48.19 OTHER PERSISTENT ATRIAL FIBRILLATION: ICD-10-CM

## 2023-12-18 ENCOUNTER — APPOINTMENT (OUTPATIENT)
Dept: PULMONOLOGY | Facility: CLINIC | Age: 63
End: 2023-12-18

## 2024-01-17 ENCOUNTER — APPOINTMENT (OUTPATIENT)
Dept: ELECTROPHYSIOLOGY | Facility: CLINIC | Age: 64
End: 2024-01-17
Payer: COMMERCIAL

## 2024-01-17 VITALS
TEMPERATURE: 98 F | BODY MASS INDEX: 34.4 KG/M2 | WEIGHT: 227 LBS | HEIGHT: 68 IN | SYSTOLIC BLOOD PRESSURE: 136 MMHG | DIASTOLIC BLOOD PRESSURE: 80 MMHG | HEART RATE: 65 BPM

## 2024-01-17 DIAGNOSIS — I48.92 UNSPECIFIED ATRIAL FLUTTER: ICD-10-CM

## 2024-01-17 PROCEDURE — 99214 OFFICE O/P EST MOD 30 MIN: CPT

## 2024-01-17 PROCEDURE — 93000 ELECTROCARDIOGRAM COMPLETE: CPT

## 2024-01-17 RX ORDER — APIXABAN 5 MG/1
5 TABLET, FILM COATED ORAL
Qty: 90 | Refills: 3 | Status: ACTIVE | COMMUNITY
Start: 2023-03-20 | End: 1900-01-01

## 2024-01-17 RX ORDER — METOPROLOL SUCCINATE 100 MG/1
100 TABLET, EXTENDED RELEASE ORAL
Qty: 90 | Refills: 3 | Status: ACTIVE | COMMUNITY
Start: 2023-03-20 | End: 1900-01-01

## 2024-01-18 LAB
ANION GAP SERPL CALC-SCNC: 23 MMOL/L
APTT BLD: 43.4 SEC
BUN SERPL-MCNC: 10 MG/DL
CALCIUM SERPL-MCNC: 10 MG/DL
CHLORIDE SERPL-SCNC: 95 MMOL/L
CO2 SERPL-SCNC: 15 MMOL/L
CREAT SERPL-MCNC: 1.2 MG/DL
EGFR: 68 ML/MIN/1.73M2
GLUCOSE SERPL-MCNC: 115 MG/DL
HCT VFR BLD CALC: 53.2 %
HGB BLD-MCNC: 18.6 G/DL
INR PPP: 1.09 RATIO
MCHC RBC-ENTMCNC: 35 G/DL
MCHC RBC-ENTMCNC: 36 PG
MCV RBC AUTO: 102.9 FL
PLATELET # BLD AUTO: 251 K/UL
PMV BLD: 10.3 FL
POTASSIUM SERPL-SCNC: 5.2 MMOL/L
PT BLD: 12.4 SEC
RBC # BLD: 5.17 M/UL
RBC # FLD: 13.6 %
SODIUM SERPL-SCNC: 133 MMOL/L
WBC # FLD AUTO: 10.01 K/UL

## 2024-01-21 NOTE — CARDIOLOGY SUMMARY
[de-identified] : 1/17/2024: NSR (HR 65 bpm), RBBB, LAD 11/22/2023 AFlutter with RVR ( bpm), RBBB 6/21/2023: NSR (HR 66 bpm), RBBB,  5/10/2023 Sinus luis miguel (HR 49 bpm), RBBB, QTc 490 msec 3/29/2023 AFlutter with RVR ( bpm) [de-identified] : GABINO/CV 4/20/2023 Normal global LVSF. Mild LAE. Successful cardioversion to NSR with 200J DCCV  TTE with echo 3/10/2023 LVEF 35-40%. Mildly enlarged left atrium. Mild pulmonary hypertension (PASP = 42mmHg). NB: patient was in rapid atrial fibrillation during this exam.  GABINO  3/10/2023  1. Limited exam due to tenuous respiratory status and desaturation. Exam terminated early due to apnea.  2. No left atrial appendage thrombus and normal left atrial appendage velocities.  3. Moderately decreased global left ventricular systolic function.  4. Mildly enlarged left atrium.  5. Unsuccessful conversion after two attempts at 200J of direct current synchronized shock.

## 2024-01-21 NOTE — HISTORY OF PRESENT ILLNESS
[FreeTextEntry1] : Cardiologist: Dr. Falk  64 yo M with history of epistaxis, HTN, palpitations and paroxysmal AF/AFl with RVR. He received amiodarone drip, s/p GABINO and cardioversion 3/10 and two shocks failed. He was started on Eliquis without s/sx of bleeding. Pt was diagnosed with acute HFrEF, cardiomegaly, TTE EF 35-40%.   He underwent GABINO/CV after Amio load on 4/20/23 and successfully converted to NSR. Patient states he feels well since cardioversion.   Underwent Cryo AF and CTI RF ablation 5/19/2023. During ablation, pulmonary vein tachycardia was noted. Patient did well post op and presents for routine follow up. Amio was discontinued in the hospital. TFTs are abnormal and pt has endocrine follow up.   11/22/2023 Here for routine follow up. Just returned from 3 week trip to [place]. [place] well until this morning. He was so anxious about coming in, he thinks he caused himself to go into AF.   1/17/24 Here for routine follow up. States he did not have to go for cardioversion because he was in sinus. Feels well. He denies chest pain, palpitations, dyspnea and edema. Stopped smoking. Lost a few lbs. Walking and trying to be healthier.

## 2024-01-21 NOTE — DISCUSSION/SUMMARY
[EKG obtained to assist in diagnosis and management of assessed problem(s)] : EKG obtained to assist in diagnosis and management of assessed problem(s) [FreeTextEntry1] : We had an extensive conversation regarding the nature of atrial fibrillation, including potential etiologies, underlying pathophysiology and natural history of the disease. In addition, the potential risk of thromboembolic events and assessment of that risk were discussed. I have emphasized the importance of continuing anticoagulation.

## 2024-01-21 NOTE — PHYSICAL EXAM
[Well Developed] : well developed [Well Nourished] : well nourished [No Acute Distress] : no acute distress [Obese] : obese [Normal Conjunctiva] : normal conjunctiva [Normal Venous Pressure] : normal venous pressure [Normal S1, S2] : normal S1, S2 [No Murmur] : no murmur [Clear Lung Fields] : clear lung fields [Good Air Entry] : good air entry [No Respiratory Distress] : no respiratory distress  [Soft] : abdomen soft [Normal Gait] : normal gait [No Edema] : no edema [No Rash] : no rash [Moves all extremities] : moves all extremities [Normal Speech] : normal speech [Alert and Oriented] : alert and oriented [de-identified] : tachycardic, irregular rate and rhythm

## 2024-01-21 NOTE — ASSESSMENT
[FreeTextEntry1] : # Paroxysmal AFib/AFlutter - Early return to AF after cardioversion. Patient loaded with Amio and successfully cardioverted to NSR. Abnormal TFTs and ?drug rash. Amio stopped. Patient s/p AF/AFlutter ablation 5/19/2023 (also had PV tach during procedure). - Cont Eliquis 5 mg PO BID given recent cardioversion and CHADS VASc of 1 (HTN). Denies s/sx of bleeding. - Cont Metoprolol Succinate  mg daily. Has prn Metoprolol Tartrate 25 mg. Took Metoprolol Tartrate and converted to sinus rhythm.   # HTN - BP well controlled - Cont Metoprolol  mg and Losartan, and Amlodipine 5 mg daily. Has prn Metoprolol Tartrate for HR.  - 2g Na diet enforced - Follow up with cardio  # SANJU  - Compliant with CPAP  I have also advised the patient to go to the nearest emergency room if he experiences any chest pain, dyspnea, syncope, or has any other compelling symptoms.  Follow up in 6 mo.

## 2024-01-24 ENCOUNTER — APPOINTMENT (OUTPATIENT)
Dept: INTERNAL MEDICINE | Facility: CLINIC | Age: 64
End: 2024-01-24

## 2024-02-07 ENCOUNTER — APPOINTMENT (OUTPATIENT)
Dept: INTERNAL MEDICINE | Facility: CLINIC | Age: 64
End: 2024-02-07
Payer: COMMERCIAL

## 2024-02-07 ENCOUNTER — OUTPATIENT (OUTPATIENT)
Dept: OUTPATIENT SERVICES | Facility: HOSPITAL | Age: 64
LOS: 1 days | End: 2024-02-07
Payer: COMMERCIAL

## 2024-02-07 VITALS
HEART RATE: 80 BPM | HEIGHT: 68 IN | TEMPERATURE: 97.9 F | BODY MASS INDEX: 35.01 KG/M2 | OXYGEN SATURATION: 100 % | DIASTOLIC BLOOD PRESSURE: 79 MMHG | SYSTOLIC BLOOD PRESSURE: 160 MMHG | WEIGHT: 231 LBS

## 2024-02-07 VITALS — SYSTOLIC BLOOD PRESSURE: 160 MMHG | DIASTOLIC BLOOD PRESSURE: 78 MMHG

## 2024-02-07 DIAGNOSIS — Z98.890 OTHER SPECIFIED POSTPROCEDURAL STATES: Chronic | ICD-10-CM

## 2024-02-07 DIAGNOSIS — I50.9 HEART FAILURE, UNSPECIFIED: ICD-10-CM

## 2024-02-07 DIAGNOSIS — N13.5 CROSSING VESSEL AND STRICTURE OF URETER W/OUT HYDRONEPHROSIS: ICD-10-CM

## 2024-02-07 DIAGNOSIS — I48.0 PAROXYSMAL ATRIAL FIBRILLATION: ICD-10-CM

## 2024-02-07 DIAGNOSIS — Z00.00 ENCOUNTER FOR GENERAL ADULT MEDICAL EXAMINATION WITHOUT ABNORMAL FINDINGS: ICD-10-CM

## 2024-02-07 DIAGNOSIS — I10 ESSENTIAL (PRIMARY) HYPERTENSION: ICD-10-CM

## 2024-02-07 DIAGNOSIS — E66.9 OBESITY, UNSPECIFIED: ICD-10-CM

## 2024-02-07 DIAGNOSIS — D75.1 SECONDARY POLYCYTHEMIA: ICD-10-CM

## 2024-02-07 PROCEDURE — 99214 OFFICE O/P EST MOD 30 MIN: CPT

## 2024-02-07 RX ORDER — FUROSEMIDE 20 MG/1
20 TABLET ORAL
Qty: 90 | Refills: 1 | Status: ACTIVE | COMMUNITY
Start: 2023-03-20 | End: 1900-01-01

## 2024-02-07 RX ORDER — AMLODIPINE BESYLATE 5 MG/1
5 TABLET ORAL DAILY
Qty: 90 | Refills: 1 | Status: ACTIVE | COMMUNITY
Start: 2023-10-25 | End: 1900-01-01

## 2024-02-07 RX ORDER — DAPAGLIFLOZIN 10 MG/1
10 TABLET, FILM COATED ORAL
Qty: 90 | Refills: 1 | Status: ACTIVE | COMMUNITY
Start: 2023-03-20 | End: 1900-01-01

## 2024-02-07 RX ORDER — LOSARTAN POTASSIUM 50 MG/1
50 TABLET, FILM COATED ORAL TWICE DAILY
Qty: 180 | Refills: 1 | Status: ACTIVE | COMMUNITY
Start: 1900-01-01 | End: 1900-01-01

## 2024-02-07 NOTE — HISTORY OF PRESENT ILLNESS
[FreeTextEntry1] : follow up [de-identified] : 64 yo M with PMH of HTN, SANJU, HFimpEF, Paroxysmal A-fib on Eliquis, Epistaxis, and BPH presents today to the clinic for f/u and medication refill. Patient typically follows with Dr. Cartwright. Using CPAP at night, reports great sleep. BP high today but patient states he just took medication before he came and that he typically runs in the 120s-130s at home. Patient stopped smoking in Oct 2023. No new complaints or concerns.

## 2024-02-07 NOTE — PHYSICAL EXAM
[No Acute Distress] : no acute distress [Well Nourished] : well nourished [Well Developed] : well developed [Well-Appearing] : well-appearing [Normal Sclera/Conjunctiva] : normal sclera/conjunctiva [PERRL] : pupils equal round and reactive to light [Normal Outer Ear/Nose] : the outer ears and nose were normal in appearance [Normal Oropharynx] : the oropharynx was normal [No JVD] : no jugular venous distention [No Lymphadenopathy] : no lymphadenopathy [No Respiratory Distress] : no respiratory distress  [No Accessory Muscle Use] : no accessory muscle use [Clear to Auscultation] : lungs were clear to auscultation bilaterally [Normal Rate] : normal rate  [Regular Rhythm] : with a regular rhythm [Normal S1, S2] : normal S1 and S2 [No Murmur] : no murmur heard [No Carotid Bruits] : no carotid bruits [Pedal Pulses Present] : the pedal pulses are present [No Edema] : there was no peripheral edema [Soft] : abdomen soft [Non Tender] : non-tender [Non-distended] : non-distended [No Masses] : no abdominal mass palpated [Normal Bowel Sounds] : normal bowel sounds [Normal Posterior Cervical Nodes] : no posterior cervical lymphadenopathy [Normal Anterior Cervical Nodes] : no anterior cervical lymphadenopathy [No CVA Tenderness] : no CVA  tenderness [No Spinal Tenderness] : no spinal tenderness [No Joint Swelling] : no joint swelling [No Rash] : no rash [No Focal Deficits] : no focal deficits [Normal Affect] : the affect was normal [Normal Insight/Judgement] : insight and judgment were intact [de-identified] : Multiple actinic keratosis lesions in upper and lower extremities; telangectasia of face

## 2024-02-07 NOTE — REVIEW OF SYSTEMS
[Redness] : redness [Joint Pain] : joint pain [Negative] : Heme/Lymph [Fever] : no fever [Chills] : no chills [Discharge] : no discharge [Pain] : no pain [Itching] : no itching [Earache] : no earache [Hearing Loss] : no hearing loss [Nosebleeds] : no nosebleeds [Sore Throat] : no sore throat [Chest Pain] : no chest pain [Palpitations] : no palpitations [Shortness Of Breath] : no shortness of breath [Wheezing] : no wheezing [Cough] : no cough [Dyspnea on Exertion] : not dyspnea on exertion [Abdominal Pain] : no abdominal pain [Constipation] : no constipation [Diarrhea] : no diarrhea [Vomiting] : no vomiting [Heartburn] : no heartburn [Dysuria] : no dysuria [Hesitancy] : no hesitancy [Nocturia] : no nocturia [Hematuria] : no hematuria [Frequency] : no frequency [Itching] : no itching [Skin Rash] : no skin rash [Headache] : no headache [Dizziness] : no dizziness [Suicidal] : not suicidal [Insomnia] : no insomnia [FreeTextEntry4] : Since discharge from hospital 3/16/2023 no episodes of epistaxis [de-identified] : insomnia improved with CPAP [FreeTextEntry9] : Bilateral knee pain, hx of acl repair of the left knee, Lumbar pain upon standing too long

## 2024-02-07 NOTE — ASSESSMENT
[FreeTextEntry1] : 64 yo M with PMH of HTN, SANJU, HfimpEF, Paroxysmal A-fib on Eliquis, Epistaxis, and BPH presents today to the clinic for follow up.  # Polycythemia - blood work from Nov - Hb 18.6  - EPO level in Oct 2023 - 20.3  - recheck EPO levels prior to next visit  - will send referral to hem/onc  # Paroxysmal A-fib with RVR - CHADSVASc 1 (HTN) - s/p GABINO dccv 5/19/23 & ablation (PV tachy during procedure) - c/w Eliquis 5mg BID - c/w Metoprolol succ 100mg daily and PRN metoprolol tartrate 25mg  - f/u with Dr. Monaco, last seen Jan 2024   # HFimpEF #HTN - Echo showed EF 35 - 40 % -> normal LV global systolic function on GABINO in April 2023  - c/w metoprolol 100mg daily  - c/w Losartan 50 mg BID - c/w Dapagliflozin 10 mg QD - c/w Lasix 20 mg as needed - started amlodipine 5mg QD at last visit for elevated BP  - BP today: 160/79 - counseled on lifestyle modifications and monitoring BP at home   #Prediabetes #obesity  - HbA1C 5.7 in July 2023  - counseled on lifestyle medications   # UPJ obstruction #hx BPH  - RBUS , NM renal scan + PSA - f/u urology   # CKD2 - avoid nephrotoxic drugs - monitor Cr  # SANJU - STOP BANG score 8, very high risk - follows with Romaine Delgado, does not want sleep studies at the moment  - c/w AutoPap 6-18  # Telangiectasia # Actinic keratosis - hx extensive sun exposure - Derm Referral at last visit, advised patient to make appointment, especially since going to [place] with sun exposure, patient still has not gone but states that he has other problems that take precident currently   # Conjunctivitis - resolved - Artificial tears  #HCM - Never had a colonoscopy, referred to GI, patient has not gone - patient has box for FIT test, will send in  - Defers Flu vaccine at this time - Hx of smoking, advised on low-dose CT chest, patient not agreeable at this time. He would like to deal with his active disease at this moment. Recently quit smoking - TdaP in 2030 - Covid vaccine x 2 - Alcohol avoidance - RTC in 3 months with Dr. Cartwright

## 2024-02-13 DIAGNOSIS — I10 ESSENTIAL (PRIMARY) HYPERTENSION: ICD-10-CM

## 2024-02-13 DIAGNOSIS — I48.0 PAROXYSMAL ATRIAL FIBRILLATION: ICD-10-CM

## 2024-02-13 DIAGNOSIS — N13.5 CROSSING VESSEL AND STRICTURE OF URETER WITHOUT HYDRONEPHROSIS: ICD-10-CM

## 2024-02-13 DIAGNOSIS — D75.1 SECONDARY POLYCYTHEMIA: ICD-10-CM

## 2024-02-13 DIAGNOSIS — E66.9 OBESITY, UNSPECIFIED: ICD-10-CM

## 2024-02-13 DIAGNOSIS — I50.9 HEART FAILURE, UNSPECIFIED: ICD-10-CM

## 2024-03-07 RX ORDER — METOPROLOL TARTRATE 25 MG/1
25 TABLET, FILM COATED ORAL
Qty: 180 | Refills: 0 | Status: ACTIVE | COMMUNITY
Start: 2023-11-22 | End: 1900-01-01

## 2024-04-04 NOTE — ED PROVIDER NOTE - NS ED ROS FT
Constitutional: (-) fever, (-) chills  Eyes: (-) visual changes  ENT: (-) nasal congestions (+) epistaxis  Cardiovascular: (-) chest pain, (-) syncope  Respiratory: (-) cough, (-) shortness of breath, (-) dyspnea,   Gastrointestinal: (-) vomiting, (-) diarrhea, (-)nausea,  Musculoskeletal: (-) neck pain, (-) back pain, (-) joint pain,  Integumentary: (-) rash, (-) edema, (-) bruises  Neurological: (-) headache, (-) loc, (-) dizziness, (-) tingling, (-)numbness,  Peripheral Vascular: (-) leg swelling  :  (-)dysuria,  (-) hematuria  Allergic/Immunologic: (-) pruritus No

## 2024-07-23 ENCOUNTER — NON-APPOINTMENT (OUTPATIENT)
Age: 64
End: 2024-07-23

## 2024-07-24 ENCOUNTER — APPOINTMENT (OUTPATIENT)
Dept: ELECTROPHYSIOLOGY | Facility: CLINIC | Age: 64
End: 2024-07-24
Payer: COMMERCIAL

## 2024-07-24 VITALS
HEART RATE: 76 BPM | SYSTOLIC BLOOD PRESSURE: 153 MMHG | WEIGHT: 235 LBS | TEMPERATURE: 97.1 F | BODY MASS INDEX: 35.61 KG/M2 | DIASTOLIC BLOOD PRESSURE: 81 MMHG | HEIGHT: 68 IN

## 2024-07-24 DIAGNOSIS — I48.92 UNSPECIFIED ATRIAL FLUTTER: ICD-10-CM

## 2024-07-24 DIAGNOSIS — F17.200 NICOTINE DEPENDENCE, UNSPECIFIED, UNCOMPLICATED: ICD-10-CM

## 2024-07-24 PROCEDURE — G2211 COMPLEX E/M VISIT ADD ON: CPT

## 2024-07-24 PROCEDURE — 93000 ELECTROCARDIOGRAM COMPLETE: CPT

## 2024-07-24 PROCEDURE — 99214 OFFICE O/P EST MOD 30 MIN: CPT

## 2024-07-24 NOTE — HISTORY OF PRESENT ILLNESS
[FreeTextEntry1] : Cardiologist: Dr. Falk PCP: Dr. Cartwright  65 yo M with history of epistaxis, HTN, palpitations and paroxysmal AF/AFl with RVR. He received amiodarone drip, s/p GABINO and cardioversion 3/10 and two shocks failed. He was started on Eliquis without s/sx of bleeding. Pt was diagnosed with acute HFrEF, cardiomegaly, TTE EF 35-40%. He underwent GABINO/CV after Amio load on 4/20/23 and successfully converted to NSR. Patient states he feels well since cardioversion.  Underwent Cryo AF and CTI RF ablation 5/19/2023. During ablation, pulmonary vein tachycardia was noted. Patient did well post op and presents for routine follow up. Amio was discontinued in the hospital. TFTs are abnormal and pt has endocrine follow up.   11/22/2023 Here for routine follow up. Just returned from 3-week trip to [place]. [place] well until this morning. He was so anxious about coming in, he thinks he caused himself to go into AF.   1/17/24 Here for follow up. He did not have to go for cardioversion because he was in sinus. Feels well. He denies chest pain, palpitations, dyspnea and edema. Stopped smoking. Lost a few lbs. Walking and trying to be healthier.   7/24/2024 Here for routine follow up visit. Feels well. The patient denies any cardiovascular complaints including chest pain, dyspnea, palpitations, dizziness, lightheadedness, presyncope or syncope.  rolling walker

## 2024-07-24 NOTE — CARDIOLOGY SUMMARY
[de-identified] : 7/24/2024: NSR (HR 76 bpm), RBBB, LAD 1/17/2024: NSR (HR 65 bpm), RBBB, LAD 11/22/2023 AFlutter with RVR ( bpm), RBBB 6/21/2023: NSR (HR 66 bpm), RBBB,  5/10/2023 Sinus luis miguel (HR 49 bpm), RBBB, QTc 490 msec 3/29/2023 AFlutter with RVR ( bpm) [de-identified] : GABINO/CV 4/20/2023 Normal global LVSF. Mild LAE. Successful cardioversion to NSR with 200J DCCV TTE with echo 3/10/2023 LVEF 35-40%. Mildly enlarged left atrium. Mild pulmonary hypertension (PASP = 42mmHg). NB: patient was in rapid atrial fibrillation during this exam.  GABINO  3/10/2023  1. Limited exam due to tenuous respiratory status and desaturation. Exam terminated early due to apnea.  2. No left atrial appendage thrombus and normal left atrial appendage velocities.  3. Moderately decreased global left ventricular systolic function.  4. Mildly enlarged left atrium.  5. Unsuccessful conversion after two attempts at 200J of direct current synchronized shock.

## 2024-07-24 NOTE — PHYSICAL EXAM
[Well Developed] : well developed [Well Nourished] : well nourished [No Acute Distress] : no acute distress [Obese] : obese [Normal Conjunctiva] : normal conjunctiva [Normal S1, S2] : normal S1, S2 [No Murmur] : no murmur [Clear Lung Fields] : clear lung fields [Good Air Entry] : good air entry [No Respiratory Distress] : no respiratory distress  [Soft] : abdomen soft [No Edema] : no edema [Moves all extremities] : moves all extremities [Normal Speech] : normal speech [Alert and Oriented] : alert and oriented [Normal Gait] : normal gait

## 2024-07-24 NOTE — ASSESSMENT
[FreeTextEntry1] : # Paroxysmal AFib/AFlutter s/p ablation 5/19/2023 - Early return to AF after cardioversion. Patient loaded with Amio and successfully cardioverted to NSR. Abnormal TFTs and ?drug rash. Amio stopped. Patient s/p AF/AFlutter ablation 5/19/2023 (also had PV tach during procedure). - Cont Eliquis 5 mg PO BID. CHADS VASc of 1 (HTN). Denies s/sx of bleeding. - Cont Metoprolol Succinate  mg BID. Has prn Metoprolol Tartrate 25 mg, but has not needed it.  # HTN - BP elevated  - Cont Metoprolol  mg BID, Losartan BID, and Amlodipine 5 mg daily. Has prn Metoprolol Tartrate for HR.  - 2g Na diet enforced - Follow up with cardio  # SANJU  - Compliant with CPAP  I have also advised the patient to go to the nearest emergency room if he experiences any chest pain, dyspnea, syncope, or has any other compelling symptoms.  Follow up in 9 mo.

## 2024-07-25 ENCOUNTER — APPOINTMENT (OUTPATIENT)
Dept: INTERNAL MEDICINE | Facility: CLINIC | Age: 64
End: 2024-07-25
Payer: COMMERCIAL

## 2024-07-25 VITALS
TEMPERATURE: 97.7 F | DIASTOLIC BLOOD PRESSURE: 83 MMHG | SYSTOLIC BLOOD PRESSURE: 138 MMHG | HEART RATE: 75 BPM | OXYGEN SATURATION: 100 % | HEIGHT: 68 IN | WEIGHT: 229.13 LBS | BODY MASS INDEX: 34.73 KG/M2

## 2024-07-25 DIAGNOSIS — E66.9 OBESITY, UNSPECIFIED: ICD-10-CM

## 2024-07-25 DIAGNOSIS — Z87.898 PERSONAL HISTORY OF OTHER SPECIFIED CONDITIONS: ICD-10-CM

## 2024-07-25 DIAGNOSIS — F17.200 NICOTINE DEPENDENCE, UNSPECIFIED, UNCOMPLICATED: ICD-10-CM

## 2024-07-25 DIAGNOSIS — I10 ESSENTIAL (PRIMARY) HYPERTENSION: ICD-10-CM

## 2024-07-25 DIAGNOSIS — E78.5 HYPERLIPIDEMIA, UNSPECIFIED: ICD-10-CM

## 2024-07-25 DIAGNOSIS — G47.33 OBSTRUCTIVE SLEEP APNEA (ADULT) (PEDIATRIC): ICD-10-CM

## 2024-07-25 DIAGNOSIS — L57.0 ACTINIC KERATOSIS: ICD-10-CM

## 2024-07-25 DIAGNOSIS — D75.1 SECONDARY POLYCYTHEMIA: ICD-10-CM

## 2024-07-25 DIAGNOSIS — R74.01 ELEVATION OF LEVELS OF LIVER TRANSAMINASE LEVELS: ICD-10-CM

## 2024-07-25 DIAGNOSIS — N18.2 CHRONIC KIDNEY DISEASE, STAGE 2 (MILD): ICD-10-CM

## 2024-07-25 DIAGNOSIS — I50.9 HEART FAILURE, UNSPECIFIED: ICD-10-CM

## 2024-07-25 DIAGNOSIS — I48.0 PAROXYSMAL ATRIAL FIBRILLATION: ICD-10-CM

## 2024-07-25 PROCEDURE — 99214 OFFICE O/P EST MOD 30 MIN: CPT | Mod: GC

## 2024-07-25 PROCEDURE — G2211 COMPLEX E/M VISIT ADD ON: CPT

## 2024-07-25 RX ORDER — ATORVASTATIN CALCIUM 40 MG/1
40 TABLET, FILM COATED ORAL
Qty: 90 | Refills: 1 | Status: ACTIVE | COMMUNITY
Start: 2024-07-25 | End: 1900-01-01

## 2024-07-25 NOTE — PHYSICAL EXAM
[No Acute Distress] : no acute distress [Well Nourished] : well nourished [No Respiratory Distress] : no respiratory distress  [No Accessory Muscle Use] : no accessory muscle use [Clear to Auscultation] : lungs were clear to auscultation bilaterally [Normal Rate] : normal rate  [Regular Rhythm] : with a regular rhythm [Normal S1, S2] : normal S1 and S2 [No Murmur] : no murmur heard [Soft] : abdomen soft [Non Tender] : non-tender [Non-distended] : non-distended [Normal Affect] : the affect was normal [Normal Insight/Judgement] : insight and judgment were intact [Normal] : normal sclera/conjunctiva, pupils are equal, round and reactive to light and extraocular movements are intact [No JVD] : no jugular venous distention [Supple] : supple [de-identified] : Actinic keratosis on upped and lower extremities, no suspicious lesions

## 2024-07-25 NOTE — HISTORY OF PRESENT ILLNESS
[FreeTextEntry1] : follow up [de-identified] : 62 yo M with PMH of HTN, SANJU, HFimpEF, Paroxysmal A-fib on Eliquis s/p cardioversion and ablation in 2023, DL, and BPH presents today to the clinic for follow up. Patient denies any cold intolerance significant weight gain, fatigue, weakness, LE edema, he also denies any bleeding from the gums, epistaxis, hematuria, or hematochezia, melena. He denies any chest pain, PND, orthopnea, palpitations, SOB. His functional capacity is unchanged. Patient reports having higher quality sleep on CPAP and is being compliant. Patient did not followup with hematology for isolated high EPO with polycythemia, dermatology for actinic keratosis, did not send us a stool sample for FOBT and refured colonoscopy. Patient also refused low dose CT scan. Patient also did not f/u with urology. Otherwise, ROS is negative

## 2024-07-25 NOTE — ASSESSMENT
[FreeTextEntry1] : 64 yo M with PMH of HTN, SANJU, HfrEF, Paroxysmal A-fib on Eliquis, Epistaxis, and BPH presents today to the clinic for follow up.  # Paroxysmal AFib/AFlutter  - CHADS VASc of 1 - s/p GABINO dccv 5/19/23 & ablation (PV tachy during procedure), off Amio - Cont Eliquis 5 mg PO BID, Denies s/sx of bleeding. - Cont Metoprolol Succinate  mg BID. Has prn Metoprolol Tartrate 25 mg, but has not needed it recently - EKG 7/24/2025:  NSR (HR 76 bpm), RBBB, LAD  # HFimpEF #HTN - Echo showed EF 35 - 40 % -> normal LV global systolic function on GABINO in April 2023 - c/w metoprolol succinate 100mg twice daily, Losartan 50 mg BID, Dapagliflozin 10 mg QD, Lasix 10 mg daily with 20 mg PRN  - amlodipine 5 mg QD - BP today: 138/83 - counseled on lifestyle modifications and monitoring BP at home, reports normal blood pressure at home - saw cardiology yesterday and rec f/u in 9 months  # Polycythemia - Feb 2024 CBC: Hb 18.0, Hct 52.8% - EPO level in July 2024 is 11.6 which went down from 20.3 in Oct 2023; likely was 2/2 SANJU, patient stopped smoking - heme onc referral previously sent but pt did not make appt, advised pt to f/u on referral  #Prediabetes #obesity - HbA1C 6.1% in Feb 2024 - counseled on lifestyle medications  #Dyslipidemia - lipid panel July 2024: , cholesterol 214, , HDL 42 - lipid panel Oct 2023 WNL - will continue to monitor - on lifestyle and diet modification  # CKD2 - avoid nephrotoxic drugs - Cr 0.9 normal - albumin/creatinine ratio is 150mg/g - on losartan 50 mg BID  # SANJU - Compliant with CPAP - STOP BANG score 8, very high risk - follows with Romaine Delgado, does not want sleep studies or low dose CT at this time - c/w AutoPap 6-18  #HCM - Never had a colonoscopy, referred to GI, patient has not gone - patient has box for FIT test, will send in - Defers Flu vaccine at this time - Hx of smoking, advised on low-dose CT chest, patient not agreeable at this time. He would like to deal with his active disease at this moment. Recently quit smoking - TdaP in 2030 - Covid vaccine x 2 - Alcohol avoidance - RTC in 6 months with Dr. Cratwright.

## 2024-08-02 ENCOUNTER — RX RENEWAL (OUTPATIENT)
Age: 64
End: 2024-08-02

## 2024-12-13 ENCOUNTER — RX RENEWAL (OUTPATIENT)
Age: 64
End: 2024-12-13

## 2024-12-25 PROBLEM — F10.90 ALCOHOL USE: Status: ACTIVE | Noted: 2023-07-26

## 2025-01-17 ENCOUNTER — APPOINTMENT (OUTPATIENT)
Dept: CARDIOLOGY | Facility: CLINIC | Age: 65
End: 2025-01-17
Payer: COMMERCIAL

## 2025-01-17 ENCOUNTER — NON-APPOINTMENT (OUTPATIENT)
Age: 65
End: 2025-01-17

## 2025-01-17 VITALS
WEIGHT: 220 LBS | RESPIRATION RATE: 16 BRPM | HEIGHT: 68 IN | BODY MASS INDEX: 33.34 KG/M2 | DIASTOLIC BLOOD PRESSURE: 80 MMHG | OXYGEN SATURATION: 99 % | TEMPERATURE: 97 F | HEART RATE: 75 BPM | SYSTOLIC BLOOD PRESSURE: 140 MMHG

## 2025-01-17 DIAGNOSIS — E78.5 HYPERLIPIDEMIA, UNSPECIFIED: ICD-10-CM

## 2025-01-17 DIAGNOSIS — G47.33 OBSTRUCTIVE SLEEP APNEA (ADULT) (PEDIATRIC): ICD-10-CM

## 2025-01-17 DIAGNOSIS — I50.9 HEART FAILURE, UNSPECIFIED: ICD-10-CM

## 2025-01-17 DIAGNOSIS — I48.0 PAROXYSMAL ATRIAL FIBRILLATION: ICD-10-CM

## 2025-01-17 DIAGNOSIS — I10 ESSENTIAL (PRIMARY) HYPERTENSION: ICD-10-CM

## 2025-01-17 PROCEDURE — 93000 ELECTROCARDIOGRAM COMPLETE: CPT

## 2025-01-17 PROCEDURE — 99214 OFFICE O/P EST MOD 30 MIN: CPT

## 2025-01-17 PROCEDURE — G2211 COMPLEX E/M VISIT ADD ON: CPT

## 2025-04-23 ENCOUNTER — APPOINTMENT (OUTPATIENT)
Dept: ELECTROPHYSIOLOGY | Facility: CLINIC | Age: 65
End: 2025-04-23

## 2025-09-04 ENCOUNTER — RX RENEWAL (OUTPATIENT)
Age: 65
End: 2025-09-04